# Patient Record
Sex: MALE | Race: WHITE | Employment: UNEMPLOYED | URBAN - METROPOLITAN AREA
[De-identification: names, ages, dates, MRNs, and addresses within clinical notes are randomized per-mention and may not be internally consistent; named-entity substitution may affect disease eponyms.]

---

## 2017-04-14 ENCOUNTER — OFFICE VISIT (OUTPATIENT)
Dept: INTERNAL MEDICINE CLINIC | Age: 47
End: 2017-04-14

## 2017-04-14 VITALS
WEIGHT: 197 LBS | DIASTOLIC BLOOD PRESSURE: 84 MMHG | HEART RATE: 76 BPM | OXYGEN SATURATION: 97 % | TEMPERATURE: 98.2 F | SYSTOLIC BLOOD PRESSURE: 120 MMHG

## 2017-04-14 DIAGNOSIS — R79.89 LOW TESTOSTERONE LEVEL IN MALE: ICD-10-CM

## 2017-04-14 DIAGNOSIS — F31.9 BIPOLAR AFFECTIVE DISORDER, REMISSION STATUS UNSPECIFIED (HCC): ICD-10-CM

## 2017-04-14 DIAGNOSIS — Z00.00 ROUTINE GENERAL MEDICAL EXAMINATION AT A HEALTH CARE FACILITY: Primary | ICD-10-CM

## 2017-04-14 DIAGNOSIS — Z00.00 ROUTINE GENERAL MEDICAL EXAMINATION AT A HEALTH CARE FACILITY: ICD-10-CM

## 2017-04-14 DIAGNOSIS — G89.29 CHRONIC MIDLINE BACK PAIN, UNSPECIFIED BACK LOCATION: ICD-10-CM

## 2017-04-14 DIAGNOSIS — M54.9 CHRONIC MIDLINE BACK PAIN, UNSPECIFIED BACK LOCATION: ICD-10-CM

## 2017-04-14 DIAGNOSIS — I10 ESSENTIAL HYPERTENSION: ICD-10-CM

## 2017-04-14 DIAGNOSIS — E03.9 HYPOTHYROIDISM, UNSPECIFIED TYPE: ICD-10-CM

## 2017-04-14 LAB
ANION GAP SERPL CALCULATED.3IONS-SCNC: 15 MMOL/L (ref 3–16)
BASOPHILS ABSOLUTE: 0.1 K/UL (ref 0–0.2)
BASOPHILS RELATIVE PERCENT: 1.1 %
BUN BLDV-MCNC: 10 MG/DL (ref 7–20)
CALCIUM SERPL-MCNC: 9.6 MG/DL (ref 8.3–10.6)
CHLORIDE BLD-SCNC: 102 MMOL/L (ref 99–110)
CHOLESTEROL, TOTAL: 231 MG/DL (ref 0–199)
CO2: 23 MMOL/L (ref 21–32)
CREAT SERPL-MCNC: 0.8 MG/DL (ref 0.9–1.3)
EOSINOPHILS ABSOLUTE: 0.2 K/UL (ref 0–0.6)
EOSINOPHILS RELATIVE PERCENT: 2.7 %
GFR AFRICAN AMERICAN: >60
GFR NON-AFRICAN AMERICAN: >60
GLUCOSE BLD-MCNC: 100 MG/DL (ref 70–99)
HCT VFR BLD CALC: 53.6 % (ref 40.5–52.5)
HDLC SERPL-MCNC: 58 MG/DL (ref 40–60)
HEMOGLOBIN: 17.9 G/DL (ref 13.5–17.5)
HEPATITIS C ANTIBODY INTERPRETATION: NORMAL
LDL CHOLESTEROL CALCULATED: 154 MG/DL
LYMPHOCYTES ABSOLUTE: 1.7 K/UL (ref 1–5.1)
LYMPHOCYTES RELATIVE PERCENT: 20.6 %
MCH RBC QN AUTO: 30.7 PG (ref 26–34)
MCHC RBC AUTO-ENTMCNC: 33.4 G/DL (ref 31–36)
MCV RBC AUTO: 91.9 FL (ref 80–100)
MONOCYTES ABSOLUTE: 0.8 K/UL (ref 0–1.3)
MONOCYTES RELATIVE PERCENT: 9.8 %
NEUTROPHILS ABSOLUTE: 5.4 K/UL (ref 1.7–7.7)
NEUTROPHILS RELATIVE PERCENT: 65.8 %
PDW BLD-RTO: 12.9 % (ref 12.4–15.4)
PLATELET # BLD: 190 K/UL (ref 135–450)
PMV BLD AUTO: 10.1 FL (ref 5–10.5)
POTASSIUM SERPL-SCNC: 4.3 MMOL/L (ref 3.5–5.1)
PROSTATE SPECIFIC ANTIGEN: 0.51 NG/ML (ref 0–4)
RBC # BLD: 5.83 M/UL (ref 4.2–5.9)
SODIUM BLD-SCNC: 140 MMOL/L (ref 136–145)
T4 FREE: 1.2 NG/DL (ref 0.9–1.8)
TRIGL SERPL-MCNC: 96 MG/DL (ref 0–150)
TSH REFLEX: 3.26 UIU/ML (ref 0.27–4.2)
VLDLC SERPL CALC-MCNC: 19 MG/DL
WBC # BLD: 8.3 K/UL (ref 4–11)

## 2017-04-14 PROCEDURE — 99386 PREV VISIT NEW AGE 40-64: CPT | Performed by: NURSE PRACTITIONER

## 2017-04-14 PROCEDURE — 93000 ELECTROCARDIOGRAM COMPLETE: CPT | Performed by: NURSE PRACTITIONER

## 2017-04-14 RX ORDER — ORPHENADRINE CITRATE 100 MG/1
100 TABLET, EXTENDED RELEASE ORAL 2 TIMES DAILY
COMMUNITY
End: 2017-04-14 | Stop reason: ALTCHOICE

## 2017-04-14 RX ORDER — LEVOTHYROXINE SODIUM 0.05 MG/1
50 TABLET ORAL DAILY
Qty: 30 TABLET | Refills: 3 | Status: SHIPPED | OUTPATIENT
Start: 2017-04-14 | End: 2017-12-13 | Stop reason: SDUPTHER

## 2017-04-14 RX ORDER — LEVOTHYROXINE SODIUM 0.05 MG/1
50 TABLET ORAL DAILY
COMMUNITY
End: 2017-04-14 | Stop reason: SDUPTHER

## 2017-04-14 RX ORDER — ZIPRASIDONE HYDROCHLORIDE 20 MG/1
20 CAPSULE ORAL 2 TIMES DAILY WITH MEALS
COMMUNITY
End: 2017-04-14 | Stop reason: ALTCHOICE

## 2017-04-14 RX ORDER — LISINOPRIL 20 MG/1
20 TABLET ORAL DAILY
COMMUNITY
End: 2017-04-14 | Stop reason: SDUPTHER

## 2017-04-14 RX ORDER — ZIPRASIDONE HYDROCHLORIDE 20 MG/1
20 CAPSULE ORAL 2 TIMES DAILY WITH MEALS
COMMUNITY
End: 2019-09-23 | Stop reason: ALTCHOICE

## 2017-04-14 RX ORDER — LISINOPRIL 20 MG/1
20 TABLET ORAL DAILY
Qty: 30 TABLET | Refills: 3 | Status: SHIPPED | OUTPATIENT
Start: 2017-04-14 | End: 2017-04-27 | Stop reason: ALTCHOICE

## 2017-04-14 RX ORDER — AMLODIPINE BESYLATE 5 MG/1
5 TABLET ORAL DAILY
Qty: 30 TABLET | Refills: 3 | Status: SHIPPED | OUTPATIENT
Start: 2017-04-14 | End: 2017-12-13 | Stop reason: SDUPTHER

## 2017-04-14 RX ORDER — AMLODIPINE BESYLATE 5 MG/1
5 TABLET ORAL DAILY
COMMUNITY
End: 2017-04-14 | Stop reason: SDUPTHER

## 2017-04-14 RX ORDER — HYDROCODONE BITARTRATE AND ACETAMINOPHEN 7.5; 325 MG/1; MG/1
1 TABLET ORAL EVERY 6 HOURS PRN
COMMUNITY
End: 2017-04-27 | Stop reason: ALTCHOICE

## 2017-04-14 RX ORDER — CLONAZEPAM 0.5 MG/1
0.5 TABLET ORAL 2 TIMES DAILY PRN
COMMUNITY
End: 2019-09-23 | Stop reason: ALTCHOICE

## 2017-04-14 ASSESSMENT — ENCOUNTER SYMPTOMS
DOUBLE VISION: 0
EYE DISCHARGE: 0
CONSTIPATION: 0
SHORTNESS OF BREATH: 0
SORE THROAT: 0
HEMOPTYSIS: 0
ABDOMINAL PAIN: 0
NAUSEA: 0
DIARRHEA: 0
ORTHOPNEA: 0
WHEEZING: 0
EYE PAIN: 0
SPUTUM PRODUCTION: 0
COUGH: 0
BLOOD IN STOOL: 0
EYE REDNESS: 0
PHOTOPHOBIA: 0
BACK PAIN: 1
STRIDOR: 0
VOMITING: 0
HEARTBURN: 0
BLURRED VISION: 0

## 2017-04-15 LAB
ESTIMATED AVERAGE GLUCOSE: 91.1 MG/DL
HBA1C MFR BLD: 4.8 %
HIV-1 AND HIV-2 ANTIBODIES: NORMAL

## 2017-04-16 LAB
SEX HORMONE BINDING GLOBULIN: 31 NMOL/L (ref 11–80)
TESTOSTERONE FREE PERCENT: 1.9 % (ref 1.6–2.9)
TESTOSTERONE FREE, CALC: 85 PG/ML (ref 47–244)
TESTOSTERONE TOTAL-MALE: 442 NG/DL (ref 300–890)

## 2017-04-17 DIAGNOSIS — R79.89 LOW TESTOSTERONE LEVEL IN MALE: Primary | ICD-10-CM

## 2017-04-18 ENCOUNTER — TELEPHONE (OUTPATIENT)
Dept: PAIN MANAGEMENT | Age: 47
End: 2017-04-18

## 2017-12-13 ENCOUNTER — OFFICE VISIT (OUTPATIENT)
Dept: PRIMARY CARE CLINIC | Age: 47
End: 2017-12-13

## 2017-12-13 VITALS
SYSTOLIC BLOOD PRESSURE: 138 MMHG | BODY MASS INDEX: 29.06 KG/M2 | HEART RATE: 77 BPM | TEMPERATURE: 97.9 F | WEIGHT: 203 LBS | DIASTOLIC BLOOD PRESSURE: 80 MMHG | HEIGHT: 70 IN | OXYGEN SATURATION: 98 %

## 2017-12-13 DIAGNOSIS — J40 BRONCHITIS: Primary | ICD-10-CM

## 2017-12-13 DIAGNOSIS — R06.2 WHEEZING ON AUSCULTATION: ICD-10-CM

## 2017-12-13 DIAGNOSIS — E03.9 HYPOTHYROIDISM, UNSPECIFIED TYPE: ICD-10-CM

## 2017-12-13 DIAGNOSIS — I10 ESSENTIAL HYPERTENSION: ICD-10-CM

## 2017-12-13 PROCEDURE — 99214 OFFICE O/P EST MOD 30 MIN: CPT | Performed by: NURSE PRACTITIONER

## 2017-12-13 RX ORDER — LEVOTHYROXINE SODIUM 0.05 MG/1
50 TABLET ORAL DAILY
Qty: 30 TABLET | Refills: 3 | Status: SHIPPED | OUTPATIENT
Start: 2017-12-13 | End: 2018-04-06 | Stop reason: SDUPTHER

## 2017-12-13 RX ORDER — METHYLPREDNISOLONE 4 MG/1
TABLET ORAL
Qty: 1 KIT | Refills: 0 | Status: SHIPPED | OUTPATIENT
Start: 2017-12-13 | End: 2017-12-19

## 2017-12-13 RX ORDER — ALBUTEROL SULFATE 90 UG/1
2 AEROSOL, METERED RESPIRATORY (INHALATION) EVERY 6 HOURS PRN
Qty: 1 INHALER | Refills: 3 | Status: SHIPPED | OUTPATIENT
Start: 2017-12-13 | End: 2019-09-23 | Stop reason: ALTCHOICE

## 2017-12-13 RX ORDER — AZITHROMYCIN 250 MG/1
TABLET, FILM COATED ORAL
Qty: 6 TABLET | Refills: 0 | Status: SHIPPED | OUTPATIENT
Start: 2017-12-13 | End: 2018-04-06 | Stop reason: ALTCHOICE

## 2017-12-13 RX ORDER — AMLODIPINE BESYLATE 5 MG/1
5 TABLET ORAL DAILY
Qty: 30 TABLET | Refills: 3 | Status: SHIPPED | OUTPATIENT
Start: 2017-12-13 | End: 2018-04-06 | Stop reason: SDUPTHER

## 2017-12-13 ASSESSMENT — ENCOUNTER SYMPTOMS
SHORTNESS OF BREATH: 1
SORE THROAT: 0
WHEEZING: 1
SINUS PAIN: 1
RHINORRHEA: 0
COUGH: 1
SPUTUM PRODUCTION: 1
HEMOPTYSIS: 0

## 2017-12-13 NOTE — PROGRESS NOTES
SUBJECTIVE:  John Moreira  1970  41 y.o. Chief Complaint   Patient presents with    Chest Congestion    Hypertension       Pt also asking for refills on BP and thyroid medicine as he has been out of for some time. Cough   This is a new problem. The current episode started 1 to 4 weeks ago. The problem has been gradually worsening. The problem occurs constantly. The cough is productive of sputum and productive of purulent sputum. Associated symptoms include chills, headaches, nasal congestion, shortness of breath, sweats and wheezing. Pertinent negatives include no chest pain, ear congestion, ear pain, fever, hemoptysis, postnasal drip, rhinorrhea or sore throat. Nothing aggravates the symptoms. Risk factors for lung disease include smoking/tobacco exposure. He has tried OTC cough suppressant (Dayquil) for the symptoms. The treatment provided mild relief. There is no history of asthma, bronchiectasis, bronchitis, COPD, emphysema, environmental allergies or pneumonia. Review of Systems   Constitutional: Positive for chills and malaise/fatigue. Negative for fever. HENT: Positive for congestion and sinus pain. Negative for ear pain, postnasal drip, rhinorrhea and sore throat. Respiratory: Positive for cough, sputum production, shortness of breath and wheezing. Negative for hemoptysis. Cardiovascular: Negative for chest pain. Neurological: Positive for headaches. Endo/Heme/Allergies: Negative for environmental allergies.        Past Medical History:   Diagnosis Date    ADHD (attention deficit hyperactivity disorder)     Anxiety     Chronic back pain     Depression     Erectile dysfunction     Headache     Hypertension     Hypothyroidism     Low testosterone level in male     Neuropathy [facility])        Current Outpatient Prescriptions   Medication Sig Dispense Refill    albuterol sulfate HFA (VENTOLIN HFA) 108 (90 Base) MCG/ACT inhaler Inhale 2 puffs into the lungs every 6 hours as needed for Wheezing 1 Inhaler 3    methylPREDNISolone (MEDROL DOSEPACK) 4 MG tablet Take by mouth. 1 kit 0    azithromycin (ZITHROMAX) 250 MG tablet Take 500 mg (two tablets) on day one. Take 250 mg (one tablet) daily for days 2-5. 6 tablet 0    levothyroxine (SYNTHROID) 50 MCG tablet Take 1 tablet by mouth Daily 30 tablet 3    amLODIPine (NORVASC) 5 MG tablet Take 1 tablet by mouth daily 30 tablet 3    butalbital-acetaminophen-caffeine (FIORICET) -40 MG per tablet Take 1 tablet by mouth every 6 hours as needed for Headaches 10 tablet 0    clonazePAM (KLONOPIN) 0.5 MG tablet Take 0.5 mg by mouth 2 times daily as needed      ziprasidone (GEODON) 20 MG capsule Take 20 mg by mouth 2 times daily (with meals)       No current facility-administered medications for this visit. OBJECTIVE:  /80   Pulse 77   Temp 97.9 °F (36.6 °C)   Ht 5' 10\" (1.778 m)   Wt 203 lb (92.1 kg)   SpO2 98%   BMI 29.13 kg/m²    Physical Exam   Constitutional: Vital signs are normal. He appears well-developed and well-nourished. HENT:   Right Ear: Hearing, tympanic membrane and ear canal normal.   Left Ear: Hearing, tympanic membrane and ear canal normal.   Nose: Rhinorrhea present. Right sinus exhibits no maxillary sinus tenderness and no frontal sinus tenderness. Left sinus exhibits no maxillary sinus tenderness and no frontal sinus tenderness. Mouth/Throat: Posterior oropharyngeal erythema present. No oropharyngeal exudate, posterior oropharyngeal edema or tonsillar abscesses. Cardiovascular: Normal rate, regular rhythm, S1 normal, S2 normal, normal heart sounds, intact distal pulses and normal pulses. Pulmonary/Chest: Effort normal. He has no decreased breath sounds. He has wheezes. He has rhonchi. He has no rales. Lymphadenopathy:        Head (right side): Submental, submandibular, tonsillar and preauricular adenopathy present.         Head (left side): Submental, submandibular, tonsillar and preauricular adenopathy present. He has no cervical adenopathy. Neurological: He is alert. Nursing note and vitals reviewed. ASSESSMENT/PLAN:  Cristian Cole was seen today for chest congestion and hypertension. Diagnoses and all orders for this visit:    Bronchitis  -     albuterol sulfate HFA (VENTOLIN HFA) 108 (90 Base) MCG/ACT inhaler; Inhale 2 puffs into the lungs every 6 hours as needed for Wheezing  -     methylPREDNISolone (MEDROL DOSEPACK) 4 MG tablet; Take by mouth.  -     azithromycin (ZITHROMAX) 250 MG tablet; Take 500 mg (two tablets) on day one. Take 250 mg (one tablet) daily for days 2-5. Wheezing on auscultation  -     albuterol sulfate HFA (VENTOLIN HFA) 108 (90 Base) MCG/ACT inhaler; Inhale 2 puffs into the lungs every 6 hours as needed for Wheezing  -     methylPREDNISolone (MEDROL DOSEPACK) 4 MG tablet; Take by mouth. Hypothyroidism, unspecified type  -     levothyroxine (SYNTHROID) 50 MCG tablet; Take 1 tablet by mouth Daily    Essential hypertension  -     amLODIPine (NORVASC) 5 MG tablet; Take 1 tablet by mouth daily          -Will rx albuterol, steroid burst, and azith for bronchitis and wheezing. -BP med and synthroid refilled. -Provided education and information on bronchitis, wheezing, symptomatic treatment, and prevention.  -If symptoms worsen or fail to improve, return for follow-up.

## 2017-12-13 NOTE — PATIENT INSTRUCTIONS
-May take Mucinex (guaifenisen) and Robitussin DM (guafenisen, dextromethorphan)  for cough and congestion. May also try Vicks Vaporub. -May take ibuprofen (Motrin, Advil) 200 mg tabs up to 4 tabs every 8 hours. May also take acetaminophen (Tylenol) as instructed on packaging.  -Avoid taking over the counter (OTC) medications that have multiple ingredients. Patient Education        Bronchitis: Care Instructions  Your Care Instructions    Bronchitis is inflammation of the bronchial tubes, which carry air to the lungs. The tubes swell and produce mucus, or phlegm. The mucus and inflamed bronchial tubes make you cough. You may have trouble breathing. Most cases of bronchitis are caused by viruses like those that cause colds. Antibiotics usually do not help and they may be harmful. Bronchitis usually develops rapidly and lasts about 2 to 3 weeks in otherwise healthy people. Follow-up care is a key part of your treatment and safety. Be sure to make and go to all appointments, and call your doctor if you are having problems. It's also a good idea to know your test results and keep a list of the medicines you take. How can you care for yourself at home? · Take all medicines exactly as prescribed. Call your doctor if you think you are having a problem with your medicine. · Get some extra rest.  · Take an over-the-counter pain medicine, such as acetaminophen (Tylenol), ibuprofen (Advil, Motrin), or naproxen (Aleve) to reduce fever and relieve body aches. Read and follow all instructions on the label. · Do not take two or more pain medicines at the same time unless the doctor told you to. Many pain medicines have acetaminophen, which is Tylenol. Too much acetaminophen (Tylenol) can be harmful. · Take an over-the-counter cough medicine that contains dextromethorphan to help quiet a dry, hacking cough so that you can sleep. Avoid cough medicines that have more than one active ingredient.  Read and follow all instructions on the label. · Breathe moist air from a humidifier, hot shower, or sink filled with hot water. The heat and moisture will thin mucus so you can cough it out. · Do not smoke. Smoking can make bronchitis worse. If you need help quitting, talk to your doctor about stop-smoking programs and medicines. These can increase your chances of quitting for good. When should you call for help? Call 911 anytime you think you may need emergency care. For example, call if:  ? · You have severe trouble breathing. ?Call your doctor now or seek immediate medical care if:  ? · You have new or worse trouble breathing. ? · You cough up dark brown or bloody mucus (sputum). ? · You have a new or higher fever. ? · You have a new rash. ? Watch closely for changes in your health, and be sure to contact your doctor if:  ? · You cough more deeply or more often, especially if you notice more mucus or a change in the color of your mucus. ? · You are not getting better as expected. Where can you learn more? Go to https://Meograph.[employer]One Rewards. org and sign in to your [employer] account. Enter H333 in the ModaMi box to learn more about \"Bronchitis: Care Instructions. \"     If you do not have an account, please click on the \"Sign Up Now\" link. Current as of: May 12, 2017  Content Version: 11.4  © 1226-9101 [employer]. Care instructions adapted under license by [facility] ([facility]). If you have questions about a medical condition or this instruction, always ask your healthcare professional. [place] [address] any warranty or liability for your use of this information. Patient Education        Wheezing or Bronchoconstriction: Care Instructions  Your Care Instructions  Wheezing is a whistling noise made during breathing. It occurs when the small airways, or bronchial tubes, that lead to your lungs swell or contract (spasm) and become narrow.  This narrowing is called quitting, talk to your doctor about stop-smoking programs and medicines. These can increase your chances of quitting for good. · Avoid things that may cause your wheezing. These may include colds, smoke, air pollution, dust, pollen, pets, cockroaches, stress, and cold air. When should you call for help? Call 911 anytime you think you may need emergency care. For example, call if:  ? · You have severe trouble breathing. ? · You passed out (lost consciousness). ?Call your doctor now or seek immediate medical care if:  ? · You cough up yellow, dark brown, or bloody mucus (sputum). ? · You have new or worse shortness of breath. ? · Your wheezing is not getting better or it gets worse after you start taking your medicine. ? Watch closely for changes in your health, and be sure to contact your doctor if:  ? · You do not get better as expected. Where can you learn more? Go to https://Thinker Thing.InStaff. org and sign in to your [employer] account. Enter 928 [id number] in the ITDatabase box to learn more about \"Wheezing or Bronchoconstriction: Care Instructions. \"     If you do not have an account, please click on the \"Sign Up Now\" link. Current as of: May 12, 2017  Content Version: 11.4  © 7485-6547 [employer]. Care instructions adapted under license by [facility] ([facility]). If you have questions about a medical condition or this instruction, always ask your healthcare professional. [place] [address] any warranty or liability for your use of this information.

## 2018-03-20 DIAGNOSIS — R79.89 LOW TESTOSTERONE LEVEL IN MALE: ICD-10-CM

## 2018-03-22 LAB
SEX HORMONE BINDING GLOBULIN: 25 NMOL/L (ref 11–80)
TESTOSTERONE FREE-NONMALE: 34.1 PG/ML (ref 47–244)
TESTOSTERONE TOTAL: 156 NG/DL (ref 220–1000)

## 2018-04-06 ENCOUNTER — OFFICE VISIT (OUTPATIENT)
Dept: PRIMARY CARE CLINIC | Age: 48
End: 2018-04-06

## 2018-04-06 VITALS
TEMPERATURE: 97.4 F | WEIGHT: 228.4 LBS | HEART RATE: 94 BPM | HEIGHT: 70 IN | DIASTOLIC BLOOD PRESSURE: 60 MMHG | SYSTOLIC BLOOD PRESSURE: 120 MMHG | BODY MASS INDEX: 32.7 KG/M2 | OXYGEN SATURATION: 98 %

## 2018-04-06 DIAGNOSIS — E03.9 HYPOTHYROIDISM, UNSPECIFIED TYPE: ICD-10-CM

## 2018-04-06 DIAGNOSIS — R53.83 FATIGUE, UNSPECIFIED TYPE: ICD-10-CM

## 2018-04-06 DIAGNOSIS — G47.00 INSOMNIA, UNSPECIFIED TYPE: ICD-10-CM

## 2018-04-06 DIAGNOSIS — I10 ESSENTIAL HYPERTENSION: ICD-10-CM

## 2018-04-06 DIAGNOSIS — F32.A DEPRESSION, UNSPECIFIED DEPRESSION TYPE: ICD-10-CM

## 2018-04-06 DIAGNOSIS — R79.89 LOW TESTOSTERONE IN MALE: Primary | ICD-10-CM

## 2018-04-06 PROCEDURE — 1036F TOBACCO NON-USER: CPT | Performed by: NURSE PRACTITIONER

## 2018-04-06 PROCEDURE — APPNB15 APP NON BILLABLE TIME 0-15 MINS: Performed by: NURSE PRACTITIONER

## 2018-04-06 PROCEDURE — G8417 CALC BMI ABV UP PARAM F/U: HCPCS | Performed by: NURSE PRACTITIONER

## 2018-04-06 PROCEDURE — G8427 DOCREV CUR MEDS BY ELIG CLIN: HCPCS | Performed by: NURSE PRACTITIONER

## 2018-04-06 PROCEDURE — 99213 OFFICE O/P EST LOW 20 MIN: CPT | Performed by: NURSE PRACTITIONER

## 2018-04-06 RX ORDER — BUTALBITAL, ACETAMINOPHEN AND CAFFEINE 50; 325; 40 MG/1; MG/1; MG/1
1 TABLET ORAL EVERY 6 HOURS PRN
Qty: 10 TABLET | Refills: 1 | Status: SHIPPED | OUTPATIENT
Start: 2018-04-06 | End: 2019-09-23 | Stop reason: ALTCHOICE

## 2018-04-06 RX ORDER — LEVOTHYROXINE SODIUM 0.05 MG/1
50 TABLET ORAL DAILY
Qty: 30 TABLET | Refills: 3 | Status: SHIPPED | OUTPATIENT
Start: 2018-04-06 | End: 2019-09-23 | Stop reason: ALTCHOICE

## 2018-04-06 RX ORDER — AMLODIPINE BESYLATE 5 MG/1
5 TABLET ORAL DAILY
Qty: 30 TABLET | Refills: 3 | Status: SHIPPED | OUTPATIENT
Start: 2018-04-06 | End: 2019-09-23 | Stop reason: ALTCHOICE

## 2018-04-06 ASSESSMENT — ENCOUNTER SYMPTOMS: GASTROINTESTINAL NEGATIVE: 1

## 2019-04-01 ENCOUNTER — TELEPHONE (OUTPATIENT)
Dept: INTERNAL MEDICINE CLINIC | Age: 49
End: 2019-04-01

## 2019-09-03 ENCOUNTER — HOSPITAL ENCOUNTER (EMERGENCY)
Age: 49
Discharge: HOME | End: 2019-09-03
Attending: EMERGENCY MEDICINE
Payer: COMMERCIAL

## 2019-09-03 VITALS
TEMPERATURE: 98.6 F | WEIGHT: 231.26 LBS | SYSTOLIC BLOOD PRESSURE: 154 MMHG | OXYGEN SATURATION: 96 % | HEART RATE: 82 BPM | HEIGHT: 70 IN | BODY MASS INDEX: 33.11 KG/M2 | RESPIRATION RATE: 20 BRPM | DIASTOLIC BLOOD PRESSURE: 88 MMHG

## 2019-09-03 DIAGNOSIS — G89.29 CHRONIC LEFT SHOULDER PAIN: ICD-10-CM

## 2019-09-03 DIAGNOSIS — H66.90 ACUTE OTITIS MEDIA, UNSPECIFIED OTITIS MEDIA TYPE: Primary | ICD-10-CM

## 2019-09-03 DIAGNOSIS — M25.512 CHRONIC LEFT SHOULDER PAIN: ICD-10-CM

## 2019-09-03 DIAGNOSIS — L73.9 FOLLICULITIS: ICD-10-CM

## 2019-09-03 PROCEDURE — 96372 THER/PROPH/DIAG INJ SC/IM: CPT

## 2019-09-03 PROCEDURE — 6360000002 HC RX W HCPCS: Performed by: EMERGENCY MEDICINE

## 2019-09-03 PROCEDURE — 6370000000 HC RX 637 (ALT 250 FOR IP): Performed by: EMERGENCY MEDICINE

## 2019-09-03 PROCEDURE — 99283 EMERGENCY DEPT VISIT LOW MDM: CPT

## 2019-09-03 RX ORDER — ACETAMINOPHEN 500 MG
500 TABLET ORAL 4 TIMES DAILY PRN
Qty: 30 TABLET | Refills: 0 | Status: SHIPPED | OUTPATIENT
Start: 2019-09-03 | End: 2019-09-23 | Stop reason: ALTCHOICE

## 2019-09-03 RX ORDER — KETOROLAC TROMETHAMINE 30 MG/ML
30 INJECTION, SOLUTION INTRAMUSCULAR; INTRAVENOUS ONCE
Status: COMPLETED | OUTPATIENT
Start: 2019-09-03 | End: 2019-09-03

## 2019-09-03 RX ORDER — NAPROXEN 250 MG/1
500 TABLET ORAL 2 TIMES DAILY WITH MEALS
Qty: 30 TABLET | Refills: 1 | Status: SHIPPED | OUTPATIENT
Start: 2019-09-03 | End: 2019-09-23 | Stop reason: ALTCHOICE

## 2019-09-03 RX ORDER — AMOXICILLIN 250 MG/1
1000 CAPSULE ORAL ONCE
Status: COMPLETED | OUTPATIENT
Start: 2019-09-03 | End: 2019-09-03

## 2019-09-03 RX ORDER — AMOXICILLIN 500 MG/1
500 TABLET, FILM COATED ORAL 2 TIMES DAILY
Qty: 40 TABLET | Refills: 0 | Status: SHIPPED | OUTPATIENT
Start: 2019-09-03 | End: 2019-09-30

## 2019-09-03 RX ORDER — AMOXICILLIN 500 MG/1
1000 TABLET, FILM COATED ORAL 2 TIMES DAILY
Qty: 40 TABLET | Refills: 0 | Status: SHIPPED | OUTPATIENT
Start: 2019-09-03 | End: 2019-09-03 | Stop reason: SDUPTHER

## 2019-09-03 RX ADMIN — AMOXICILLIN 1000 MG: 250 CAPSULE ORAL at 21:06

## 2019-09-03 RX ADMIN — KETOROLAC TROMETHAMINE 30 MG: 30 INJECTION, SOLUTION INTRAMUSCULAR at 21:06

## 2019-09-03 ASSESSMENT — PAIN DESCRIPTION - LOCATION
LOCATION: SHOULDER
LOCATION: SHOULDER
LOCATION: EAR

## 2019-09-03 ASSESSMENT — PAIN DESCRIPTION - FREQUENCY
FREQUENCY: INTERMITTENT
FREQUENCY: INTERMITTENT
FREQUENCY: CONTINUOUS

## 2019-09-03 ASSESSMENT — PAIN DESCRIPTION - DESCRIPTORS
DESCRIPTORS: ACHING;THROBBING
DESCRIPTORS: ACHING;SHARP
DESCRIPTORS: SPASM

## 2019-09-03 ASSESSMENT — PAIN SCALES - GENERAL
PAINLEVEL_OUTOF10: 8
PAINLEVEL_OUTOF10: 7
PAINLEVEL_OUTOF10: 6
PAINLEVEL_OUTOF10: 7
PAINLEVEL_OUTOF10: 8

## 2019-09-03 ASSESSMENT — PAIN DESCRIPTION - PAIN TYPE
TYPE: ACUTE PAIN
TYPE: ACUTE PAIN;CHRONIC PAIN
TYPE: CHRONIC PAIN;ACUTE PAIN

## 2019-09-03 ASSESSMENT — PAIN DESCRIPTION - ORIENTATION
ORIENTATION: LEFT

## 2019-09-03 ASSESSMENT — PAIN - FUNCTIONAL ASSESSMENT: PAIN_FUNCTIONAL_ASSESSMENT: PREVENTS OR INTERFERES SOME ACTIVE ACTIVITIES AND ADLS

## 2019-09-04 NOTE — ED PROVIDER NOTES
[id number] [facility]  eMERGENCY dEPARTMENTeNCOUnter      Pt Name: Cookie Voss  MRN: [id number]  [place] 1970  Date of evaluation: 9/3/2019  Provider: Zaida Garcia MD    [address] [place] [address]       Chief Complaint   Patient presents with   [place]     ear ache left ear and tenderness under left ear/states \"whole head hurts\"    Shoulder Pain     states left shoulder remains painful after surgery on 4/2/19 needs an MRI and pain medication         HISTORY OF PRESENT ILLNESS   (Location/Symptom, Timing/Onset,Context/Setting, Quality, Duration, Modifying Factors, Severity)  Note limiting factors. Cookie Voss is a 54 y.o. male who presents to the emergency department for multiple complaints including left ear pain and difficulty hearing from the left ear. Headache, left shoulder pain. He also has a bump underneath his right arm. With regard to the ear pain this is been going on for about 2 days. He has been having left shoulder pain since surgery in April. He had an injury in January. He is seen orthopedic surgery on Friday and they plan to schedule an MRI but this was not done yet. He was not given any medications for pain. With regards to the bump under his arm he said that this is been there for a couple of months. It is tender. No fevers or chills. Nursing notes were reviewed. REVIEW OF SYSTEMS    (2-9 systems for level 4, 10 or more for level 5)     Review of Systems    Positive and pertinent negative as per HPI. Except as noted above in the ROS, all other systems were reviewed and were negative.     PAST MEDICAL HISTORY     Past Medical History:   Diagnosis Date    ADHD (attention deficit hyperactivity disorder)     Anxiety     Chronic back pain     Depression     Erectile dysfunction     Headache     Hypertension     Hypothyroidism     Low testosterone level in male     Neuropathy          SURGICALHISTORY       Past Surgical History:   Procedure Laterality Date

## 2019-09-04 NOTE — ED NOTES
Ambulates to room #7 accompanied by S.O.  With several complaints  Had signed in with \"side pain\" when in fact he is complaining about a left earache and tenderness behind left ear-  Also is complaining about pain in the left shoulder and arm since surgery on 4/2/19-states that his ortho did not want to order anything for pain until he had his MRI-movement is limited  With left arm, face is rogelio  Complains of pain with :MD checking pupils, touching pinna of RIGHT ear, palpation of head, moving right arm/shoulder  States that he \"lost\" his MD due to change in insurance and is out of all his medications and has been trying to get new MD \"for months\"  States he needs his thyroid/geodon/klonipin  States his shoulder pain prevent him from sleeping     Jason Choi RN  09/03/19 [id number]

## 2019-09-05 ENCOUNTER — TELEPHONE (OUTPATIENT)
Dept: OTHER | Age: 49
End: 2019-09-05

## 2019-09-10 ENCOUNTER — HOSPITAL ENCOUNTER (OUTPATIENT)
Dept: MRI IMAGING | Age: 49
Discharge: HOME | End: 2019-09-10
Payer: COMMERCIAL

## 2019-09-10 DIAGNOSIS — M25.512 LEFT SHOULDER PAIN, UNSPECIFIED CHRONICITY: ICD-10-CM

## 2019-09-10 DIAGNOSIS — G89.29 OTHER CHRONIC PAIN: ICD-10-CM

## 2019-09-10 PROCEDURE — 73221 MRI JOINT UPR EXTREM W/O DYE: CPT

## 2019-09-23 ENCOUNTER — OFFICE VISIT (OUTPATIENT)
Dept: FAMILY MEDICINE CLINIC | Age: 49
End: 2019-09-23
Payer: COMMERCIAL

## 2019-09-23 VITALS
WEIGHT: 239.4 LBS | SYSTOLIC BLOOD PRESSURE: 172 MMHG | BODY MASS INDEX: 34.27 KG/M2 | HEIGHT: 70 IN | DIASTOLIC BLOOD PRESSURE: 102 MMHG

## 2019-09-23 DIAGNOSIS — R79.89 LOW TESTOSTERONE: ICD-10-CM

## 2019-09-23 DIAGNOSIS — Z76.89 ENCOUNTER TO ESTABLISH CARE: Primary | ICD-10-CM

## 2019-09-23 DIAGNOSIS — M25.512 CHRONIC LEFT SHOULDER PAIN: ICD-10-CM

## 2019-09-23 DIAGNOSIS — G89.29 CHRONIC LEFT SHOULDER PAIN: ICD-10-CM

## 2019-09-23 DIAGNOSIS — F32.A DEPRESSION, UNSPECIFIED DEPRESSION TYPE: ICD-10-CM

## 2019-09-23 DIAGNOSIS — I10 HYPERTENSION, UNSPECIFIED TYPE: ICD-10-CM

## 2019-09-23 DIAGNOSIS — F41.9 ANXIETY: ICD-10-CM

## 2019-09-23 DIAGNOSIS — H66.92 LEFT OTITIS MEDIA, UNSPECIFIED OTITIS MEDIA TYPE: ICD-10-CM

## 2019-09-23 PROCEDURE — G8427 DOCREV CUR MEDS BY ELIG CLIN: HCPCS | Performed by: FAMILY MEDICINE

## 2019-09-23 PROCEDURE — 1036F TOBACCO NON-USER: CPT | Performed by: FAMILY MEDICINE

## 2019-09-23 PROCEDURE — G8417 CALC BMI ABV UP PARAM F/U: HCPCS | Performed by: FAMILY MEDICINE

## 2019-09-23 PROCEDURE — 99214 OFFICE O/P EST MOD 30 MIN: CPT | Performed by: FAMILY MEDICINE

## 2019-09-23 RX ORDER — AMOXICILLIN AND CLAVULANATE POTASSIUM 875; 125 MG/1; MG/1
1 TABLET, FILM COATED ORAL 2 TIMES DAILY
Qty: 28 TABLET | Refills: 0 | Status: SHIPPED | OUTPATIENT
Start: 2019-09-23 | End: 2019-10-07

## 2019-09-23 RX ORDER — LISINOPRIL AND HYDROCHLOROTHIAZIDE 12.5; 1 MG/1; MG/1
1 TABLET ORAL DAILY
Qty: 30 TABLET | Refills: 0 | Status: SHIPPED | OUTPATIENT
Start: 2019-09-23 | End: 2019-10-24 | Stop reason: SDUPTHER

## 2019-09-23 ASSESSMENT — ENCOUNTER SYMPTOMS
COUGH: 0
DIARRHEA: 0
FACIAL SWELLING: 0
NAUSEA: 0
SINUS PRESSURE: 0
ABDOMINAL PAIN: 0
SORE THROAT: 0
SHORTNESS OF BREATH: 0
VOMITING: 0
TROUBLE SWALLOWING: 0
WHEEZING: 0
EYE DISCHARGE: 0
CHEST TIGHTNESS: 0
RHINORRHEA: 0

## 2019-09-23 ASSESSMENT — PATIENT HEALTH QUESTIONNAIRE - PHQ9
SUM OF ALL RESPONSES TO PHQ QUESTIONS 1-9: 22
2. FEELING DOWN, DEPRESSED OR HOPELESS: 3
9. THOUGHTS THAT YOU WOULD BE BETTER OFF DEAD, OR OF HURTING YOURSELF: 1
3. TROUBLE FALLING OR STAYING ASLEEP: 3
1. LITTLE INTEREST OR PLEASURE IN DOING THINGS: 3
5. POOR APPETITE OR OVEREATING: 1
10. IF YOU CHECKED OFF ANY PROBLEMS, HOW DIFFICULT HAVE THESE PROBLEMS MADE IT FOR YOU TO DO YOUR WORK, TAKE CARE OF THINGS AT HOME, OR GET ALONG WITH OTHER PEOPLE: 3
SUM OF ALL RESPONSES TO PHQ QUESTIONS 1-9: 22
8. MOVING OR SPEAKING SO SLOWLY THAT OTHER PEOPLE COULD HAVE NOTICED. OR THE OPPOSITE, BEING SO FIGETY OR RESTLESS THAT YOU HAVE BEEN MOVING AROUND A LOT MORE THAN USUAL: 3
SUM OF ALL RESPONSES TO PHQ9 QUESTIONS 1 & 2: 6
6. FEELING BAD ABOUT YOURSELF - OR THAT YOU ARE A FAILURE OR HAVE LET YOURSELF OR YOUR FAMILY DOWN: 3
7. TROUBLE CONCENTRATING ON THINGS, SUCH AS READING THE NEWSPAPER OR WATCHING TELEVISION: 2
4. FEELING TIRED OR HAVING LITTLE ENERGY: 3

## 2019-09-24 DIAGNOSIS — R79.89 LOW TESTOSTERONE: ICD-10-CM

## 2019-09-24 DIAGNOSIS — I10 HYPERTENSION, UNSPECIFIED TYPE: ICD-10-CM

## 2019-09-24 DIAGNOSIS — Z76.89 ENCOUNTER TO ESTABLISH CARE: ICD-10-CM

## 2019-09-24 LAB
A/G RATIO: 1.8 (ref 1.1–2.2)
ALBUMIN SERPL-MCNC: 4.4 G/DL (ref 3.4–5)
ALP BLD-CCNC: 101 U/L (ref 40–129)
ALT SERPL-CCNC: 53 U/L (ref 10–40)
ANION GAP SERPL CALCULATED.3IONS-SCNC: 16 MMOL/L (ref 3–16)
AST SERPL-CCNC: 29 U/L (ref 15–37)
BILIRUB SERPL-MCNC: 0.5 MG/DL (ref 0–1)
BUN BLDV-MCNC: 13 MG/DL (ref 7–20)
CALCIUM SERPL-MCNC: 9.7 MG/DL (ref 8.3–10.6)
CHLORIDE BLD-SCNC: 101 MMOL/L (ref 99–110)
CO2: 24 MMOL/L (ref 21–32)
CREAT SERPL-MCNC: 1.1 MG/DL (ref 0.9–1.3)
GFR AFRICAN AMERICAN: >60
GFR NON-AFRICAN AMERICAN: >60
GLOBULIN: 2.4 G/DL
GLUCOSE BLD-MCNC: 90 MG/DL (ref 70–99)
HCT VFR BLD CALC: 54.4 % (ref 40.5–52.5)
HEMOGLOBIN: 17.8 G/DL (ref 13.5–17.5)
MCH RBC QN AUTO: 28.9 PG (ref 26–34)
MCHC RBC AUTO-ENTMCNC: 32.8 G/DL (ref 31–36)
MCV RBC AUTO: 88 FL (ref 80–100)
PDW BLD-RTO: 14.7 % (ref 12.4–15.4)
PLATELET # BLD: 240 K/UL (ref 135–450)
PMV BLD AUTO: 9.8 FL (ref 5–10.5)
POTASSIUM SERPL-SCNC: 4.9 MMOL/L (ref 3.5–5.1)
RBC # BLD: 6.18 M/UL (ref 4.2–5.9)
SODIUM BLD-SCNC: 141 MMOL/L (ref 136–145)
T4 FREE: 1.2 NG/DL (ref 0.9–1.8)
TOTAL PROTEIN: 6.8 G/DL (ref 6.4–8.2)
TSH REFLEX FT4: 4.78 UIU/ML (ref 0.27–4.2)
WBC # BLD: 11.4 K/UL (ref 4–11)

## 2019-09-26 DIAGNOSIS — R79.89 ELEVATED TESTOSTERONE LEVEL: Primary | ICD-10-CM

## 2019-09-26 LAB
SEX HORMONE BINDING GLOBULIN: 19 NMOL/L (ref 11–80)
TESTOSTERONE FREE-NONMALE: ABNORMAL PG/ML (ref 47–244)
TESTOSTERONE TOTAL: >1500 NG/DL (ref 220–1000)

## 2019-09-30 ENCOUNTER — OFFICE VISIT (OUTPATIENT)
Dept: FAMILY MEDICINE CLINIC | Age: 49
End: 2019-09-30
Payer: COMMERCIAL

## 2019-09-30 VITALS
HEIGHT: 70 IN | WEIGHT: 226.4 LBS | SYSTOLIC BLOOD PRESSURE: 142 MMHG | BODY MASS INDEX: 32.41 KG/M2 | DIASTOLIC BLOOD PRESSURE: 98 MMHG

## 2019-09-30 DIAGNOSIS — I10 ESSENTIAL HYPERTENSION: ICD-10-CM

## 2019-09-30 DIAGNOSIS — F41.8 DEPRESSION WITH ANXIETY: ICD-10-CM

## 2019-09-30 DIAGNOSIS — H91.93 HEARING LOSS ASSOCIATED WITH SYNDROME OF BOTH EARS: ICD-10-CM

## 2019-09-30 DIAGNOSIS — E03.9 HYPOTHYROIDISM, UNSPECIFIED TYPE: ICD-10-CM

## 2019-09-30 DIAGNOSIS — R79.89 LOW TESTOSTERONE: Primary | ICD-10-CM

## 2019-09-30 PROCEDURE — 1036F TOBACCO NON-USER: CPT | Performed by: FAMILY MEDICINE

## 2019-09-30 PROCEDURE — G8417 CALC BMI ABV UP PARAM F/U: HCPCS | Performed by: FAMILY MEDICINE

## 2019-09-30 PROCEDURE — 99214 OFFICE O/P EST MOD 30 MIN: CPT | Performed by: FAMILY MEDICINE

## 2019-09-30 PROCEDURE — G8427 DOCREV CUR MEDS BY ELIG CLIN: HCPCS | Performed by: FAMILY MEDICINE

## 2019-09-30 RX ORDER — LEVOTHYROXINE SODIUM 0.03 MG/1
25 TABLET ORAL DAILY
Qty: 30 TABLET | Refills: 2 | Status: SHIPPED | OUTPATIENT
Start: 2019-09-30 | End: 2019-10-28 | Stop reason: SDUPTHER

## 2019-09-30 ASSESSMENT — ENCOUNTER SYMPTOMS
ABDOMINAL PAIN: 0
DIARRHEA: 0
SORE THROAT: 0
SHORTNESS OF BREATH: 0
WHEEZING: 0
SINUS PRESSURE: 0
RHINORRHEA: 0
CHEST TIGHTNESS: 0
TROUBLE SWALLOWING: 0
COUGH: 0
EYE DISCHARGE: 0
NAUSEA: 0
VOMITING: 0

## 2019-10-02 ASSESSMENT — ENCOUNTER SYMPTOMS: BACK PAIN: 1

## 2019-10-14 ENCOUNTER — PROCEDURE VISIT (OUTPATIENT)
Dept: AUDIOLOGY | Age: 49
End: 2019-10-14
Payer: COMMERCIAL

## 2019-10-14 DIAGNOSIS — H90.3 SENSORINEURAL HEARING LOSS (SNHL) OF BOTH EARS: Primary | ICD-10-CM

## 2019-10-14 PROCEDURE — 92557 COMPREHENSIVE HEARING TEST: CPT | Performed by: AUDIOLOGIST

## 2019-10-14 PROCEDURE — 92567 TYMPANOMETRY: CPT | Performed by: AUDIOLOGIST

## 2019-10-25 RX ORDER — LISINOPRIL AND HYDROCHLOROTHIAZIDE 12.5; 1 MG/1; MG/1
TABLET ORAL
Qty: 30 TABLET | Refills: 0 | Status: SHIPPED | OUTPATIENT
Start: 2019-10-25 | End: 2019-10-28 | Stop reason: SDUPTHER

## 2019-10-28 ENCOUNTER — TELEPHONE (OUTPATIENT)
Dept: ENT CLINIC | Age: 49
End: 2019-10-28

## 2019-10-28 ENCOUNTER — OFFICE VISIT (OUTPATIENT)
Dept: FAMILY MEDICINE CLINIC | Age: 49
End: 2019-10-28
Payer: COMMERCIAL

## 2019-10-28 VITALS
DIASTOLIC BLOOD PRESSURE: 88 MMHG | HEIGHT: 70 IN | SYSTOLIC BLOOD PRESSURE: 130 MMHG | WEIGHT: 228.2 LBS | BODY MASS INDEX: 32.67 KG/M2

## 2019-10-28 DIAGNOSIS — R53.83 FATIGUE, UNSPECIFIED TYPE: ICD-10-CM

## 2019-10-28 DIAGNOSIS — M25.50 ARTHRALGIA, UNSPECIFIED JOINT: ICD-10-CM

## 2019-10-28 DIAGNOSIS — R74.8 ELEVATED LIVER ENZYMES: ICD-10-CM

## 2019-10-28 DIAGNOSIS — F32.A DEPRESSION, UNSPECIFIED DEPRESSION TYPE: ICD-10-CM

## 2019-10-28 DIAGNOSIS — I10 ESSENTIAL HYPERTENSION: Primary | ICD-10-CM

## 2019-10-28 DIAGNOSIS — E03.9 HYPOTHYROIDISM, UNSPECIFIED TYPE: ICD-10-CM

## 2019-10-28 LAB
A/G RATIO: 2.8 (ref 1.1–2.2)
ALBUMIN SERPL-MCNC: 5 G/DL (ref 3.4–5)
ALP BLD-CCNC: 109 U/L (ref 40–129)
ALT SERPL-CCNC: 69 U/L (ref 10–40)
ANION GAP SERPL CALCULATED.3IONS-SCNC: 17 MMOL/L (ref 3–16)
AST SERPL-CCNC: 36 U/L (ref 15–37)
BILIRUB SERPL-MCNC: 0.5 MG/DL (ref 0–1)
BUN BLDV-MCNC: 12 MG/DL (ref 7–20)
CALCIUM SERPL-MCNC: 9.8 MG/DL (ref 8.3–10.6)
CHLORIDE BLD-SCNC: 102 MMOL/L (ref 99–110)
CHOLESTEROL, TOTAL: 220 MG/DL (ref 0–199)
CO2: 22 MMOL/L (ref 21–32)
CREAT SERPL-MCNC: 1 MG/DL (ref 0.9–1.3)
GFR AFRICAN AMERICAN: >60
GFR NON-AFRICAN AMERICAN: >60
GLOBULIN: 1.8 G/DL
GLUCOSE BLD-MCNC: 104 MG/DL (ref 70–99)
HCT VFR BLD CALC: 54 % (ref 40.5–52.5)
HDLC SERPL-MCNC: 57 MG/DL (ref 40–60)
HEMOGLOBIN: 18 G/DL (ref 13.5–17.5)
LDL CHOLESTEROL CALCULATED: 143 MG/DL
MCH RBC QN AUTO: 29.2 PG (ref 26–34)
MCHC RBC AUTO-ENTMCNC: 33.4 G/DL (ref 31–36)
MCV RBC AUTO: 87.4 FL (ref 80–100)
PDW BLD-RTO: 13.5 % (ref 12.4–15.4)
PLATELET # BLD: 218 K/UL (ref 135–450)
PMV BLD AUTO: 9.7 FL (ref 5–10.5)
POTASSIUM SERPL-SCNC: 4.1 MMOL/L (ref 3.5–5.1)
RBC # BLD: 6.18 M/UL (ref 4.2–5.9)
SEDIMENTATION RATE, ERYTHROCYTE: 1 MM/HR (ref 0–15)
SODIUM BLD-SCNC: 141 MMOL/L (ref 136–145)
TOTAL PROTEIN: 6.8 G/DL (ref 6.4–8.2)
TRIGL SERPL-MCNC: 100 MG/DL (ref 0–150)
VLDLC SERPL CALC-MCNC: 20 MG/DL
WBC # BLD: 8.8 K/UL (ref 4–11)

## 2019-10-28 PROCEDURE — G8484 FLU IMMUNIZE NO ADMIN: HCPCS | Performed by: FAMILY MEDICINE

## 2019-10-28 PROCEDURE — G8427 DOCREV CUR MEDS BY ELIG CLIN: HCPCS | Performed by: FAMILY MEDICINE

## 2019-10-28 PROCEDURE — 96160 PT-FOCUSED HLTH RISK ASSMT: CPT | Performed by: FAMILY MEDICINE

## 2019-10-28 PROCEDURE — 99214 OFFICE O/P EST MOD 30 MIN: CPT | Performed by: FAMILY MEDICINE

## 2019-10-28 PROCEDURE — G8417 CALC BMI ABV UP PARAM F/U: HCPCS | Performed by: FAMILY MEDICINE

## 2019-10-28 PROCEDURE — 1036F TOBACCO NON-USER: CPT | Performed by: FAMILY MEDICINE

## 2019-10-28 RX ORDER — LISINOPRIL AND HYDROCHLOROTHIAZIDE 12.5; 1 MG/1; MG/1
TABLET ORAL
Qty: 30 TABLET | Refills: 0 | Status: SHIPPED | OUTPATIENT
Start: 2019-10-28 | End: 2020-01-06

## 2019-10-28 RX ORDER — LEVOTHYROXINE SODIUM 0.03 MG/1
25 TABLET ORAL DAILY
Qty: 30 TABLET | Refills: 2 | Status: SHIPPED | OUTPATIENT
Start: 2019-10-28 | End: 2021-02-22

## 2019-10-28 SDOH — HEALTH STABILITY: MENTAL HEALTH: HOW OFTEN DO YOU HAVE A DRINK CONTAINING ALCOHOL?: MONTHLY OR LESS

## 2019-10-28 ASSESSMENT — PATIENT HEALTH QUESTIONNAIRE - PHQ9
3. TROUBLE FALLING OR STAYING ASLEEP: 3
2. FEELING DOWN, DEPRESSED OR HOPELESS: 2
6. FEELING BAD ABOUT YOURSELF - OR THAT YOU ARE A FAILURE OR HAVE LET YOURSELF OR YOUR FAMILY DOWN: 3
8. MOVING OR SPEAKING SO SLOWLY THAT OTHER PEOPLE COULD HAVE NOTICED. OR THE OPPOSITE, BEING SO FIGETY OR RESTLESS THAT YOU HAVE BEEN MOVING AROUND A LOT MORE THAN USUAL: 2
SUM OF ALL RESPONSES TO PHQ QUESTIONS 1-9: 19
SUM OF ALL RESPONSES TO PHQ9 QUESTIONS 1 & 2: 4
7. TROUBLE CONCENTRATING ON THINGS, SUCH AS READING THE NEWSPAPER OR WATCHING TELEVISION: 2
9. THOUGHTS THAT YOU WOULD BE BETTER OFF DEAD, OR OF HURTING YOURSELF: 1
4. FEELING TIRED OR HAVING LITTLE ENERGY: 3
1. LITTLE INTEREST OR PLEASURE IN DOING THINGS: 2
SUM OF ALL RESPONSES TO PHQ QUESTIONS 1-9: 19
5. POOR APPETITE OR OVEREATING: 1
10. IF YOU CHECKED OFF ANY PROBLEMS, HOW DIFFICULT HAVE THESE PROBLEMS MADE IT FOR YOU TO DO YOUR WORK, TAKE CARE OF THINGS AT HOME, OR GET ALONG WITH OTHER PEOPLE: 2

## 2019-10-28 ASSESSMENT — ENCOUNTER SYMPTOMS
SORE THROAT: 0
EYE DISCHARGE: 0
DIARRHEA: 0
COUGH: 0
SHORTNESS OF BREATH: 0
WHEEZING: 0
SINUS PRESSURE: 0
VOMITING: 0
RHINORRHEA: 0
ABDOMINAL PAIN: 0
CHEST TIGHTNESS: 0
NAUSEA: 0
COLOR CHANGE: 0
TROUBLE SWALLOWING: 0

## 2019-10-29 LAB — ANTI-NUCLEAR ANTIBODY (ANA): NEGATIVE

## 2019-12-10 ENCOUNTER — APPOINTMENT (OUTPATIENT)
Dept: GENERAL RADIOLOGY | Age: 49
End: 2019-12-10
Payer: COMMERCIAL

## 2019-12-10 ENCOUNTER — APPOINTMENT (OUTPATIENT)
Dept: CT IMAGING | Age: 49
End: 2019-12-10
Payer: COMMERCIAL

## 2019-12-10 ENCOUNTER — HOSPITAL ENCOUNTER (EMERGENCY)
Age: 49
Discharge: HOME | End: 2019-12-10
Attending: EMERGENCY MEDICINE
Payer: COMMERCIAL

## 2019-12-10 DIAGNOSIS — M25.512 CHRONIC LEFT SHOULDER PAIN: Primary | ICD-10-CM

## 2019-12-10 DIAGNOSIS — G89.29 CHRONIC LEFT SHOULDER PAIN: Primary | ICD-10-CM

## 2019-12-10 DIAGNOSIS — R07.9 CHEST PAIN, UNSPECIFIED TYPE: ICD-10-CM

## 2019-12-10 DIAGNOSIS — R10.84 GENERALIZED ABDOMINAL PAIN: ICD-10-CM

## 2019-12-10 PROCEDURE — 99283 EMERGENCY DEPT VISIT LOW MDM: CPT

## 2019-12-10 PROCEDURE — 93005 ELECTROCARDIOGRAM TRACING: CPT | Performed by: EMERGENCY MEDICINE

## 2019-12-10 RX ORDER — KETOROLAC TROMETHAMINE 30 MG/ML
15 INJECTION, SOLUTION INTRAMUSCULAR; INTRAVENOUS ONCE
Status: DISCONTINUED | OUTPATIENT
Start: 2019-12-10 | End: 2019-12-10 | Stop reason: HOSPADM

## 2019-12-10 RX ORDER — METOPROLOL TARTRATE 100 MG/1
100 TABLET ORAL 2 TIMES DAILY
COMMUNITY
End: 2021-02-22

## 2019-12-10 ASSESSMENT — ENCOUNTER SYMPTOMS
SHORTNESS OF BREATH: 0
BACK PAIN: 0
BLOOD IN STOOL: 0
VOMITING: 1
ABDOMINAL PAIN: 1
WHEEZING: 0
VOICE CHANGE: 0
NAUSEA: 1
PHOTOPHOBIA: 0
COLOR CHANGE: 0
TROUBLE SWALLOWING: 0
FACIAL SWELLING: 0
STRIDOR: 0

## 2019-12-11 LAB
EKG ATRIAL RATE: 74 BPM
EKG DIAGNOSIS: NORMAL
EKG P AXIS: 26 DEGREES
EKG P-R INTERVAL: 138 MS
EKG Q-T INTERVAL: 384 MS
EKG QRS DURATION: 114 MS
EKG QTC CALCULATION (BAZETT): 426 MS
EKG R AXIS: -3 DEGREES
EKG T AXIS: -20 DEGREES
EKG VENTRICULAR RATE: 74 BPM

## 2019-12-11 PROCEDURE — 93010 ELECTROCARDIOGRAM REPORT: CPT | Performed by: INTERNAL MEDICINE

## 2020-01-06 RX ORDER — LISINOPRIL AND HYDROCHLOROTHIAZIDE 12.5; 1 MG/1; MG/1
TABLET ORAL
Qty: 30 TABLET | Refills: 0 | Status: SHIPPED
Start: 2020-01-06 | End: 2020-03-09 | Stop reason: SINTOL

## 2020-02-28 ENCOUNTER — TELEPHONE (OUTPATIENT)
Dept: OTHER | Age: 50
End: 2020-02-28

## 2020-03-09 ENCOUNTER — OFFICE VISIT (OUTPATIENT)
Dept: PRIMARY CARE CLINIC | Age: 50
End: 2020-03-09
Payer: COMMERCIAL

## 2020-03-09 VITALS
SYSTOLIC BLOOD PRESSURE: 134 MMHG | HEIGHT: 70 IN | OXYGEN SATURATION: 95 % | RESPIRATION RATE: 18 BRPM | BODY MASS INDEX: 32.1 KG/M2 | WEIGHT: 224.2 LBS | HEART RATE: 76 BPM | DIASTOLIC BLOOD PRESSURE: 86 MMHG | TEMPERATURE: 98.1 F

## 2020-03-09 PROCEDURE — G8484 FLU IMMUNIZE NO ADMIN: HCPCS | Performed by: NURSE PRACTITIONER

## 2020-03-09 PROCEDURE — G8427 DOCREV CUR MEDS BY ELIG CLIN: HCPCS | Performed by: NURSE PRACTITIONER

## 2020-03-09 PROCEDURE — 99407 BEHAV CHNG SMOKING > 10 MIN: CPT | Performed by: NURSE PRACTITIONER

## 2020-03-09 PROCEDURE — 99215 OFFICE O/P EST HI 40 MIN: CPT | Performed by: NURSE PRACTITIONER

## 2020-03-09 PROCEDURE — 1036F TOBACCO NON-USER: CPT | Performed by: NURSE PRACTITIONER

## 2020-03-09 PROCEDURE — G8417 CALC BMI ABV UP PARAM F/U: HCPCS | Performed by: NURSE PRACTITIONER

## 2020-03-09 RX ORDER — BLOOD PRESSURE TEST KIT
1 KIT MISCELLANEOUS DAILY
Qty: 1 KIT | Refills: 0 | Status: SHIPPED | OUTPATIENT
Start: 2020-03-09 | End: 2021-02-22

## 2020-03-09 RX ORDER — TRAZODONE HYDROCHLORIDE 50 MG/1
50 TABLET ORAL NIGHTLY
Qty: 30 TABLET | Refills: 0 | Status: SHIPPED | OUTPATIENT
Start: 2020-03-09 | End: 2020-04-06

## 2020-03-09 RX ORDER — LISINOPRIL 10 MG/1
10 TABLET ORAL DAILY
Qty: 30 TABLET | Refills: 0 | Status: SHIPPED | OUTPATIENT
Start: 2020-03-09 | End: 2020-04-06

## 2020-03-09 RX ORDER — BUPROPION HYDROCHLORIDE 150 MG/1
150 TABLET ORAL EVERY MORNING
Qty: 30 TABLET | Refills: 0 | Status: SHIPPED | OUTPATIENT
Start: 2020-03-09 | End: 2020-04-06

## 2020-03-09 ASSESSMENT — ENCOUNTER SYMPTOMS
WHEEZING: 0
COUGH: 0
ABDOMINAL PAIN: 0
VOMITING: 0
SINUS PAIN: 0
DIARRHEA: 0
CHEST TIGHTNESS: 0
SHORTNESS OF BREATH: 0
SORE THROAT: 0
FACIAL SWELLING: 0
TROUBLE SWALLOWING: 0
EYE PAIN: 0
NAUSEA: 0

## 2020-03-09 NOTE — PATIENT INSTRUCTIONS
Chronic Pain: Care Instructions  Your Care Instructions    Chronic pain is pain that lasts a long time (months or even years) and may or may not have a clear cause. It is different from acute pain, which usually does have a clear cause--like an injury or illness--and gets better over time. Chronic pain:  · Lasts over time but may vary from day to day. · Does not go away despite efforts to end it. · May disrupt your sleep and lead to fatigue. · May cause depression or anxiety. · May make your muscles tense, causing more pain. · Can disrupt your work, hobbies, home life, and relationships with friends and family. Chronic pain is a very real condition. It is not just in your head. Treatment can help and usually includes several methods used together, such as medicines, physical therapy, exercise, and other treatments. Learning how to relax and changing negative thought patterns can also help you cope. Chronic pain is complex. Taking an active role in your treatment will help you better manage your pain. Tell your doctor if you have trouble dealing with your pain. You may have to try several things before you find what works best for you. Follow-up care is a key part of your treatment and safety. Be sure to make and go to all appointments, and call your doctor if you are having problems. It's also a good idea to know your test results and keep a list of the medicines you take. How can you care for yourself at home? · Pace yourself. Break up large jobs into smaller tasks. Save harder tasks for days when you have less pain, or go back and forth between hard tasks and easier ones. Take rest breaks. · Relax, and reduce stress. Relaxation techniques such as deep breathing or meditation can help. · Keep moving. Gentle, daily exercise can help reduce pain over the long run. Try low- or no-impact exercises such as walking, swimming, and stationary biking. Do stretches to stay flexible.   · Try heat, cold packs, and massage. · Get enough sleep. Chronic pain can make you tired and drain your energy. Talk with your doctor if you have trouble sleeping because of pain. · Think positive. Your thoughts can affect your pain level. Do things that you enjoy to distract yourself when you have pain instead of focusing on the pain. See a movie, read a book, listen to music, or spend time with a friend. · If you think you are depressed, talk to your doctor about treatment. · Keep a daily pain diary. Record how your moods, thoughts, sleep patterns, activities, and medicine affect your pain. You may find that your pain is worse during or after certain activities or when you are feeling a certain emotion. Having a record of your pain can help you and your doctor find the best ways to treat your pain. · Take pain medicines exactly as directed. ? If the doctor gave you a prescription medicine for pain, take it as prescribed. ? If you are not taking a prescription pain medicine, ask your doctor if you can take an over-the-counter medicine. Reducing constipation caused by pain medicine  · Include fruits, vegetables, beans, and whole grains in your diet each day. These foods are high in fiber. · Drink plenty of fluids, enough so that your urine is light yellow or clear like water. If you have kidney, heart, or liver disease and have to limit fluids, talk with your doctor before you increase the amount of fluids you drink. · If your doctor recommends it, get more exercise. Walking is a good choice. Bit by bit, increase the amount you walk every day. Try for at least 30 minutes on most days of the week. · Schedule time each day for a bowel movement. A daily routine may help. Take your time and do not strain when having a bowel movement. When should you call for help?   Call your doctor now or seek immediate medical care if:    · Your pain gets worse or is out of control.     · You feel down or blue, or you do not enjoy things like you once did. You may be depressed, which is common in people with chronic pain. Depression can be treated.     · You have vomiting or cramps for more than 2 hours.    Watch closely for changes in your health, and be sure to contact your doctor if:    · You cannot sleep because of pain.     · You are very worried or anxious about your pain.     · You have trouble taking your pain medicine.     · You have any concerns about your pain medicine.     · You have trouble with bowel movements, such as:  ? No bowel movement in 3 days. ? Blood in the anal area, in your stool, or on the toilet paper. ? Diarrhea for more than 24 hours. Where can you learn more? Go to https://Surgient.Neiron. org and sign in to your [facility] account. Enter N004 in the Organic Society box to learn more about \"Chronic Pain: Care Instructions. \"     If you do not have an account, please click on the \"Sign Up Now\" link. Current as of: March 28, 2019  Content Version: 12.3  © 3850-1016 [employer] Reviewed. Care instructions adapted under license by [facility] ([facility] ([facility]). If you have questions about a medical condition or this instruction, always ask your healthcare professional. [place] [address] any warranty or liability for your use of this information. Patient Education        Recovering From Depression: Care Instructions  Your Care Instructions    Taking good care of yourself is important as you recover from depression. In time, your symptoms will fade as your treatment takes hold. Do not give up. Instead, focus your energy on getting better. Your mood will improve. It just takes some time. Focus on things that can help you feel better, such as being with friends and family, eating well, and getting enough rest. But take things slowly. Do not do too much too soon. You will begin to feel better gradually. Follow-up care is a key part of your treatment and safety.  Be sure to make and go to all appointments, and call your doctor if you are having problems. It's also a good idea to know your test results and keep a list of the medicines you take. How can you care for yourself at home? Be realistic  · If you have a large task to do, break it up into smaller steps you can handle, and just do what you can. · You may want to put off important decisions until your depression has lifted. If you have plans that will have a major impact on your life, such as marriage, divorce, or a job change, try to wait a bit. Talk it over with friends and loved ones who can help you look at the overall picture first.  · Reaching out to people for help is important. Do not isolate yourself. Let your family and friends help you. Find someone you can trust and confide in, and talk to that person. · Be patient, and be kind to yourself. Remember that depression is not your fault and is not something you can overcome with willpower alone. Treatment is necessary for depression, just like for any other illness. Feeling better takes time, and your mood will improve little by little. Stay active  · Stay busy and get outside. Take a walk, or try some other light exercise. · Talk with your doctor about an exercise program. Exercise can help with mild depression. · Go to a movie or concert. Take part in a Christianity activity or other social gathering. Go to a ball game. · Ask a friend to have dinner with you. Take care of yourself  · Eat a balanced diet with plenty of fresh fruits and vegetables, whole grains, and lean protein. If you have lost your appetite, eat small snacks rather than large meals. · Avoid drinking alcohol or using illegal drugs. Do not take medicines that have not been prescribed for you. They may interfere with medicines you may be taking for depression, or they may make your depression worse. · Take your medicines exactly as they are prescribed.  You may start to feel better within 1 to 3 weeks of taking antidepressant depression and:  ? Starts to give away his or her possessions. ? Uses illegal drugs or drinks alcohol heavily. ? Talks or writes about death, including writing suicide notes or talking about guns, knives, or pills. ? Starts to spend a lot of time alone. ? Acts very aggressively or suddenly appears calm.    Watch closely for changes in your health, and be sure to contact your doctor if:    · You do not get better as expected. Where can you learn more? Go to https://[employer].Programmr. org and sign in to your [employer] account. Enter P832 in the [employer] box to learn more about \"Recovering From Depression: Care Instructions. \"     If you do not have an account, please click on the \"Sign Up Now\" link. Current as of: May 28, 2019  Content Version: 12.3  © 8990-8738 Healthwise, BeMyGuest. Care instructions adapted under license by [facility] ([facility]). If you have questions about a medical condition or this instruction, always ask your healthcare professional. [place] [address] any warranty or liability for your use of this information. Patient Education        High Blood Pressure: Care Instructions  Overview    It's normal for blood pressure to go up and down throughout the day. But if it stays up, you have high blood pressure. Another name for high blood pressure is hypertension. Despite what a lot of people think, high blood pressure usually doesn't cause headaches or make you feel dizzy or lightheaded. It usually has no symptoms. But it does increase your risk of stroke, heart attack, and other problems. You and your doctor will talk about your risks of these problems based on your blood pressure. Your doctor will give you a goal for your blood pressure. Your goal will be based on your health and your age. Lifestyle changes, such as eating healthy and being active, are always important to help lower blood pressure.  You might also take medicine to reach your blood pressure goal.  Follow-up care is a key part of your treatment and safety. Be sure to make and go to all appointments, and call your doctor if you are having problems. It's also a good idea to know your test results and keep a list of the medicines you take. How can you care for yourself at home? Medical treatment  · If you stop taking your medicine, your blood pressure will go back up. You may take one or more types of medicine to lower your blood pressure. Be safe with medicines. Take your medicine exactly as prescribed. Call your doctor if you think you are having a problem with your medicine. · Talk to your doctor before you start taking aspirin every day. Aspirin can help certain people lower their risk of a heart attack or stroke. But taking aspirin isn't right for everyone, because it can cause serious bleeding. · See your doctor regularly. You may need to see the doctor more often at first or until your blood pressure comes down. · If you are taking blood pressure medicine, talk to your doctor before you take decongestants or anti-inflammatory medicine, such as ibuprofen. Some of these medicines can raise blood pressure. · Learn how to check your blood pressure at home. Lifestyle changes  · Stay at a healthy weight. This is especially important if you put on weight around the waist. Losing even 10 pounds can help you lower your blood pressure. · If your doctor recommends it, get more exercise. Walking is a good choice. Bit by bit, increase the amount you walk every day. Try for at least 30 minutes on most days of the week. You also may want to swim, bike, or do other activities. · Avoid or limit alcohol. Talk to your doctor about whether you can drink any alcohol. · Try to limit how much sodium you eat to less than 2,300 milligrams (mg) a day. Your doctor may ask you to try to eat less than 1,500 mg a day.   · Eat plenty of fruits (such as bananas and oranges), vegetables, legumes, whole grains, and low-fat dairy products. · Lower the amount of saturated fat in your diet. Saturated fat is found in animal products such as milk, cheese, and meat. Limiting these foods may help you lose weight and also lower your risk for heart disease. · Do not smoke. Smoking increases your risk for heart attack and stroke. If you need help quitting, talk to your doctor about stop-smoking programs and medicines. These can increase your chances of quitting for good. When should you call for help? Call  911 anytime you think you may need emergency care. This may mean having symptoms that suggest that your blood pressure is causing a serious heart or blood vessel problem. Your blood pressure may be over 180/120.   For example, call  911 if:    · You have symptoms of a heart attack. These may include:  ? Chest pain or pressure, or a strange feeling in the chest.  ? Sweating. ? Shortness of breath. ? Nausea or vomiting. ? Pain, pressure, or a strange feeling in the back, neck, jaw, or upper belly or in one or both shoulders or arms. ? Lightheadedness or sudden weakness. ? A fast or irregular heartbeat.     · You have symptoms of a stroke. These may include:  ? Sudden numbness, tingling, weakness, or loss of movement in your face, arm, or leg, especially on only one side of your body. ? Sudden vision changes. ? Sudden trouble speaking. ? Sudden confusion or trouble understanding simple statements. ? Sudden problems with walking or balance. ? A sudden, severe headache that is different from past headaches.     · You have severe back or belly pain.    Do not wait until your blood pressure comes down on its own.  Get help right away.   Call your doctor now or seek immediate care if:    · Your blood pressure is much higher than normal (such as 180/120 or higher), but you don't have symptoms.     · You think high blood pressure is causing symptoms, such as:  ? Severe headache.  ? Blurry vision.    Watch closely for changes in your health, and be sure to contact your doctor if:    · Your blood pressure measures higher than your doctor recommends at least 2 times. That means the top number is higher or the bottom number is higher, or both.     · You think you may be having side effects from your blood pressure medicine. Where can you learn more? Go to https://chpepicewtam.Validas. org and sign in to your [employer] account. Enter N143 in the Painting With A Twist box to learn more about \"High Blood Pressure: Care Instructions. \"     If you do not have an account, please click on the \"Sign Up Now\" link. Current as of: April 9, 2019  Content Version: 12.3  © 3122-9749 Healthwise, Incorporated. Care instructions adapted under license by [facility] ([facility]). If you have questions about a medical condition or this instruction, always ask your healthcare professional. [address] any warranty or liability for your use of this information.

## 2020-03-09 NOTE — PROGRESS NOTES
3/9/2020    Lupillo Casarez (:  1970) gerry 52 y.o. male, here for evaluation of the following medical concerns:    HPI     Pt comes to clinic to establish care. Pt was a former of Dr. Destiney Mckenzie. Pt has pmhx of insomnia, depression, htn, right shoulder pain, chronic pain. Patient reports that he had two previous surgeries on shoulder. HTN- BP has been stable. Pt has not taken lisinopril-hctz 12.5mg  for over 2 weeks because he \"couldn't control his bladder\". Pt is not checking his blood pressure. Patient is not watching his diet or exercising. Denies headache or vision changes. Smoking cessation- Patient smokes 1 ppd. He has has had few attempts in the past to stop smoking. Denies cough,SOB, or wheezing. Hypothyroidism- Patient has not been taking levothyroxine. His last TSH was 8. He does experience fatigue and depression, but that seems to be multifactorial.     Insomnia-Ongoing for 1 year, had surgery on April. Attributes to chronic pain and depression. Tore supraspinatus and left rotator cuff at work ( followed [employer]). Patient denies snoring. Mother had hx of schizophrenia. He also follows up depression. Current symptoms include anhedonia, depressed mood, difficulty concentrating, fatigue, feelings of worthlessness/guilt, hopelessness and insomnia. Symptoms have been gradually worsening since that time. Patient denies psychomotor agitation, psychomotor retardation, recurrent thoughts of death, suicidal attempt, suicidal thoughts with specific plan, suicidal thoughts without plan and weight gain. Previous treatment includes: medication. He complains of the following side effects from the treatment: none. He has not made appt with psych, does not particularly want to do. Chronic pain- Patient reports that he has chronic neck and upper back issues. Reports that he has worker's comp for left shoulder issues. He attributes pain issues to much of his insomnia.  SOPHIA and sed rate kavya by Dr. Kalyan Hurst were normal. Patient denies having any imaging done for several years. Patient     Low T- Pt's last lab for testosterone was high. Pt denies taking exogenous steroids. States that should be a false result because he has \"never had high testosterone\"    HLD- Pt has poor diet and does not exercise. Review of Systems   Constitutional: Positive for fatigue. Negative for chills and fever. HENT: Negative for congestion, ear pain, facial swelling, sinus pain, sore throat and trouble swallowing. Eyes: Negative for pain and visual disturbance. Respiratory: Negative for cough, chest tightness, shortness of breath and wheezing. Cardiovascular: Negative for chest pain, palpitations and leg swelling. Gastrointestinal: Negative for abdominal pain, diarrhea, nausea and vomiting. Endocrine: Negative for polydipsia and polyuria. Genitourinary: Negative for difficulty urinating and hematuria. Musculoskeletal: Positive for arthralgias and myalgias. Skin: Negative for pallor and rash. Allergic/Immunologic: Negative for environmental allergies and food allergies. Neurological: Negative for dizziness, syncope, weakness, numbness and headaches. Hematological: Negative for adenopathy. Does not bruise/bleed easily. Psychiatric/Behavioral: Positive for behavioral problems, dysphoric mood and sleep disturbance. Negative for suicidal ideas. The patient is nervous/anxious. Prior to Visit Medications    Medication Sig Taking?  Authorizing Provider   lisinopril (PRINIVIL;ZESTRIL) 10 MG tablet Take 1 tablet by mouth daily Yes SANTA Neumann CNP   buPROPion (WELLBUTRIN XL) 150 MG extended release tablet Take 1 tablet by mouth every morning Yes SANTA Neumann CNP   traZODone (DESYREL) 50 MG tablet Take 1 tablet by mouth nightly Yes SANTA Neumann CNP   Blood Pressure KIT 1 kit by Does not apply route daily Yes SANTA Neumann CNP   nicotine polacrilex connections     Talks on phone: Not on file     Gets together: Not on file     Attends Mu-ism service: Not on file     Active member of club or organization: Not on file     Attends meetings of clubs or organizations: Not on file     Relationship status: Not on file    Intimate partner violence     Fear of current or ex partner: Not on file     Emotionally abused: Not on file     Physically abused: Not on file     Forced sexual activity: Not on file   Other Topics Concern    Not on file   Social History Narrative    Not on file        History reviewed. No pertinent family history. Vitals:    03/09/20 1017   BP: 134/86   Site: Left Upper Arm   Position: Sitting   Cuff Size: Large Adult   Pulse: 76   Resp: 18   Temp: 98.1 °F (36.7 °C)   TempSrc: Oral   SpO2: 95%   Weight: 224 lb 3.2 oz (101.7 kg)   Height: 5' 9.5\" (1.765 m)     Estimated body mass index is 32.63 kg/m² as calculated from the following:    Height as of this encounter: 5' 9.5\" (1.765 m). Weight as of this encounter: 224 lb 3.2 oz (101.7 kg). Physical Exam  Vitals signs reviewed. Constitutional:       Appearance: He is well-developed. HENT:      Right Ear: External ear normal.      Left Ear: External ear normal.      Nose: Nose normal.   Eyes:      Conjunctiva/sclera: Conjunctivae normal.      Pupils: Pupils are equal, round, and reactive to light. Neck:      Musculoskeletal: Normal range of motion and neck supple. Thyroid: No thyromegaly. Cardiovascular:      Rate and Rhythm: Normal rate and regular rhythm. Heart sounds: Normal heart sounds. Pulmonary:      Effort: Pulmonary effort is normal.      Breath sounds: Normal breath sounds. Abdominal:      General: Bowel sounds are normal.      Palpations: Abdomen is soft. Musculoskeletal:      Right shoulder: He exhibits tenderness, pain and spasm. Left shoulder: He exhibits decreased range of motion, tenderness, pain and spasm.       Cervical back: He exhibits tenderness, pain and spasm. Thoracic back: He exhibits tenderness, pain and spasm. Skin:     General: Skin is warm and dry. Capillary Refill: Capillary refill takes less than 2 seconds. Neurological:      Mental Status: He is alert and oriented to person, place, and time. Psychiatric:         Attention and Perception: He is inattentive. Mood and Affect: Mood is anxious. Affect is flat. Speech: Speech is tangential.         Behavior: Behavior normal. Behavior is cooperative. Thought Content: Thought content normal.         Cognition and Memory: Cognition normal.         Judgment: Judgment normal.         ASSESSMENT/PLAN:  1. Essential hypertension  -Stable  -Keep BP logs  -removed hctz d/t urinary complaints  - lisinopril (PRINIVIL;ZESTRIL) 10 MG tablet; Take 1 tablet by mouth daily  Dispense: 30 tablet; Refill: 0  - Blood Pressure KIT; 1 kit by Does not apply route daily  Dispense: 1 kit; Refill: 0    2. Depression, unspecified depression type  -Discussed with pt that I do not maintain benzodiazepine prescriptions for new patients. Handouts describing disease, natural history, and treatment were given to the patient. Reviewed concept of anxiety as biochemical imbalance of neurotransmitters and rationale for treatment. Instructed patient to contact office or on-call physician promptly should condition worsen or any new symptoms appear and provided on-call telephone numbers. IF THE PATIENT HAS ANY SUICIDAL OR HOMICIDAL IDEATIONS, CALL THE OFFICE, DISCUSS WITH A SUPPORT MEMBER, OR GO TO THE ER IMMEDIATELY. Patient was agreeable with this plan. - buPROPion (WELLBUTRIN XL) 150 MG extended release tablet; Take 1 tablet by mouth every morning  Dispense: 30 tablet; Refill: 0  - External Referral to Psychiatry    3. Insomnia, unspecified type  -Discussed sleep hygiene  - traZODone (DESYREL) 50 MG tablet; Take 1 tablet by mouth nightly  Dispense: 30 tablet; Refill: 0    4.  Low testosterone in male  Refer for low T treatment  - Derek Lundberg MD, Endocrinology, [facility]    5. Neck pain    - XR CERVICAL SPINE (2-3 VIEWS); Future  - Joaquín Smith MD, Orthopedic Surgery, [facility]    6. Chronic left-sided thoracic back pain    - XR THORACIC SPINE (2 VIEWS); Future    7. Tobacco dependence    - nicotine polacrilex (NICORETTE) 2 MG gum; Take 1 each by mouth as needed for Smoking cessation (no more than 9 tablets a day)  Dispense: 110 each; Refill: 3    8. Encounter for smoking cessation counseling  Patient was counseled on tobacco cessation. Based upon patient's motivation to change his behavior, the following plan was agreed upon: patient will try the following tobacco cessation strategies:  Chantix: risks and benefits of this medication discussed- patient will call for any significant adverse effects. He was provided with a list of local tobacco cessation resources. Provider spent 12 minutes counseling patient. - nicotine polacrilex (NICORETTE) 2 MG gum; Take 1 each by mouth as needed for Smoking cessation (no more than 9 tablets a day)  Dispense: 110 each; Refill: 3      Return in about 4 weeks (around 4/6/2020) for Re-evaluation depression, HTN , smoking cessation .

## 2020-03-11 ENCOUNTER — TELEPHONE (OUTPATIENT)
Dept: PRIMARY CARE CLINIC | Age: 50
End: 2020-03-11

## 2020-03-11 NOTE — TELEPHONE ENCOUNTER
Patient called to say he is unable to get an apt w/endocrinologist until 7/30/2020. Wants to know if he can get an order from PCP to have testosterone levels checked. Please call pt to advise.

## 2020-04-16 ENCOUNTER — TELEPHONE (OUTPATIENT)
Dept: ORTHOPEDIC SURGERY | Age: 50
End: 2020-04-16

## 2021-02-17 ENCOUNTER — TELEPHONE (OUTPATIENT)
Dept: ORTHOPEDIC SURGERY | Age: 51
End: 2021-02-17

## 2021-02-17 NOTE — TELEPHONE ENCOUNTER
ALL PATIENT NEEDS TO SCHEDULE AN APPOINTMENT IS AUTHORIZATION FROM [employer]. MAMADOU HAS MADE MANY ATTEMPTS TO GET THIS AUTH. SHE HAS CALLED MUSTAPHA ADORNO AT [employer]/ SHE HAS FAXED TO HER WITH NO RESPONSE. WE HAVE EXPLAINED THIS TO MRS JOSEPH AND MR JOSEPH THIS IS ALL WE NEED TO GET HIM SCHEDULED. THEY HAVE A COURT ORDER APPROVED FOR SURGERY AND SURGERY ONLY. THIS IS NOT AUTH FOR AN OFFICE VISIT. MRS JOSEPH CALLED TODAY STATED SHE WOULD LIKE US TO WRITE A NOTE STATING THAT WE ARE REFUSING TO TREAT MR JOSEPH WHICH IS NOT TRUE, ALL WE NEED IS THE AUTH FROM [employer].  WE ALWAYS DO THIS TO MAKE SURE OFFICE VISIT X-RAYS AND TREATMENT WILL BE COVERED AND HE WILL NOT BE HELD ACCOUNTABLE FOR THIS.

## 2021-02-22 ENCOUNTER — OFFICE VISIT (OUTPATIENT)
Dept: FAMILY MEDICINE CLINIC | Age: 51
End: 2021-02-22
Payer: COMMERCIAL

## 2021-02-22 VITALS
SYSTOLIC BLOOD PRESSURE: 166 MMHG | DIASTOLIC BLOOD PRESSURE: 102 MMHG | WEIGHT: 237.8 LBS | RESPIRATION RATE: 14 BRPM | OXYGEN SATURATION: 96 % | TEMPERATURE: 97.5 F | BODY MASS INDEX: 34.04 KG/M2 | HEIGHT: 70 IN | HEART RATE: 80 BPM

## 2021-02-22 DIAGNOSIS — Z13.1 SCREENING FOR DIABETES MELLITUS (DM): ICD-10-CM

## 2021-02-22 DIAGNOSIS — R07.9 CHEST PAIN, UNSPECIFIED TYPE: Primary | ICD-10-CM

## 2021-02-22 DIAGNOSIS — I10 ESSENTIAL HYPERTENSION: ICD-10-CM

## 2021-02-22 DIAGNOSIS — R79.89 LOW TESTOSTERONE IN MALE: ICD-10-CM

## 2021-02-22 DIAGNOSIS — E03.9 HYPOTHYROIDISM, UNSPECIFIED TYPE: ICD-10-CM

## 2021-02-22 DIAGNOSIS — G89.29 CHRONIC LEFT SHOULDER PAIN: ICD-10-CM

## 2021-02-22 DIAGNOSIS — Z13.220 SCREENING FOR LIPID DISORDERS: ICD-10-CM

## 2021-02-22 DIAGNOSIS — M25.512 CHRONIC LEFT SHOULDER PAIN: ICD-10-CM

## 2021-02-22 PROCEDURE — 93000 ELECTROCARDIOGRAM COMPLETE: CPT | Performed by: INTERNAL MEDICINE

## 2021-02-22 PROCEDURE — G8427 DOCREV CUR MEDS BY ELIG CLIN: HCPCS | Performed by: INTERNAL MEDICINE

## 2021-02-22 PROCEDURE — 99204 OFFICE O/P NEW MOD 45 MIN: CPT | Performed by: INTERNAL MEDICINE

## 2021-02-22 PROCEDURE — 1036F TOBACCO NON-USER: CPT | Performed by: INTERNAL MEDICINE

## 2021-02-22 PROCEDURE — G8484 FLU IMMUNIZE NO ADMIN: HCPCS | Performed by: INTERNAL MEDICINE

## 2021-02-22 PROCEDURE — G8417 CALC BMI ABV UP PARAM F/U: HCPCS | Performed by: INTERNAL MEDICINE

## 2021-02-22 RX ORDER — BLOOD PRESSURE TEST KIT
1 KIT MISCELLANEOUS DAILY
Qty: 1 KIT | Refills: 0 | Status: SHIPPED | OUTPATIENT
Start: 2021-02-22 | End: 2021-12-03

## 2021-02-22 RX ORDER — METOPROLOL TARTRATE 100 MG/1
100 TABLET ORAL 2 TIMES DAILY
Qty: 60 TABLET | Refills: 0 | Status: SHIPPED | OUTPATIENT
Start: 2021-02-22 | End: 2021-03-22

## 2021-02-22 RX ORDER — LISINOPRIL 10 MG/1
TABLET ORAL
Qty: 90 TABLET | Refills: 0 | Status: SHIPPED | OUTPATIENT
Start: 2021-02-22 | End: 2021-05-26

## 2021-02-22 SDOH — ECONOMIC STABILITY: TRANSPORTATION INSECURITY
IN THE PAST 12 MONTHS, HAS LACK OF TRANSPORTATION KEPT YOU FROM MEETINGS, WORK, OR FROM GETTING THINGS NEEDED FOR DAILY LIVING?: NO

## 2021-02-22 SDOH — ECONOMIC STABILITY: FOOD INSECURITY: WITHIN THE PAST 12 MONTHS, YOU WORRIED THAT YOUR FOOD WOULD RUN OUT BEFORE YOU GOT MONEY TO BUY MORE.: NEVER TRUE

## 2021-02-22 SDOH — ECONOMIC STABILITY: TRANSPORTATION INSECURITY
IN THE PAST 12 MONTHS, HAS THE LACK OF TRANSPORTATION KEPT YOU FROM MEDICAL APPOINTMENTS OR FROM GETTING MEDICATIONS?: NO

## 2021-02-22 SDOH — ECONOMIC STABILITY: FOOD INSECURITY: WITHIN THE PAST 12 MONTHS, THE FOOD YOU BOUGHT JUST DIDN'T LAST AND YOU DIDN'T HAVE MONEY TO GET MORE.: NEVER TRUE

## 2021-02-22 ASSESSMENT — ENCOUNTER SYMPTOMS
NAUSEA: 0
SHORTNESS OF BREATH: 1
WHEEZING: 1
DIARRHEA: 0
VOMITING: 0
CONSTIPATION: 0
COLOR CHANGE: 0
EYE PAIN: 0

## 2021-02-22 NOTE — PROGRESS NOTES
2/22/2021    Chief Complaint   Patient presents with    New Patient       HPI  New pt to get established. Does not drive much because of his shoulder. Has torn supraspinatus tendon . Had surgery last feb in [place] with workman comp. Still waiting to find out who he can go to .    htn   Not on medication     \"you have the screen\"  2014 had HTN  Off med     Takes testosterone - gets from [facility]"      Sometimes drinks alcohol and smokes marijuana  \"I don't have a doctor\"    Has chest pain with lying down . Standing up   Smoked marijuana last night and did not feel it  2 nights ago had chest pain sometimes lying in bed  \"feels like my heart stops and I can't breath\"  Skipped beat  Had sharp pain in the chest for one second  Few times last  2 weeks  Not everyday  Does not have heart dx  No early heart dx in the family  No pain in the arm or jaw    Has shooting pain in both arms but does not think that is related. Very anxious      Said the medicine that he was given   \"did not work\"  Said zoloft and wellbutrin did not help. Eats well  Exercise  Was working at [employer]  Has not worked for 2 yrs  bc of shoulder    Has 5 children  2 grown    Youngest 12 yo  Wife working  Hoping to go back to work      Review of Systems   Constitutional: Positive for fatigue. Negative for unexpected weight change. HENT: Positive for hearing loss. Negative for tinnitus. Eyes: Positive for visual disturbance. Negative for pain. Respiratory: Positive for shortness of breath and wheezing. Cardiovascular: Positive for chest pain. Negative for palpitations and leg swelling. Gastrointestinal: Negative for constipation, diarrhea, nausea and vomiting. Endocrine: Negative for cold intolerance and heat intolerance. Genitourinary: Negative for dysuria and frequency. Musculoskeletal: Negative for gait problem and joint swelling. Skin: Negative for color change and rash. Neurological: Negative for dizziness and headaches. Psychiatric/Behavioral: Positive for dysphoric mood and sleep disturbance. Negative for suicidal ideas. The patient is nervous/anxious. Not homicidal         Health Maintenance   Topic Date Due    Flu vaccine (1) 09/01/2020    TSH testing  02/28/2021    Potassium monitoring  02/28/2021    Creatinine monitoring  02/28/2021    Lipid screen  02/28/2025    DTaP/Tdap/Td vaccine (2 - Tdap) 09/14/2025    Hepatitis C screen  Completed    HIV screen  Completed    Hepatitis A vaccine  Aged Out    Hepatitis B vaccine  Aged Out    Hib vaccine  Aged Out    Meningococcal (ACWY) vaccine  Aged Out    Pneumococcal 0-64 years Vaccine  Aged Out      Social History     Tobacco Use    Smoking status: Former Smoker     Types: Cigarettes    Smokeless tobacco: Never Used   Substance Use Topics    Alcohol use: Yes     Frequency: Monthly or less     Comment: occoasional use     Drug use: Yes     Types: Marijuana     Comment: for pain and anxiety. History reviewed. No pertinent family history.   Prior to Visit Medications    Not on File     Patient Active Problem List   Diagnosis    Low testosterone in male    Fatigue    Hypothyroidism    Insomnia    Depression    Hypertension        LABS:   Lab Results   Component Value Date    GLUCOSE 94 02/28/2020     Lab Results   Component Value Date     02/28/2020    K 3.9 02/28/2020    CREATININE 0.9 02/28/2020     Cholesterol, Total   Date Value Ref Range Status   02/28/2020 177 0 - 199 mg/dL Final     LDL Calculated   Date Value Ref Range Status   02/28/2020 115 (H) <100 mg/dL Final     HDL   Date Value Ref Range Status   02/28/2020 37 (L) 40 - 60 mg/dL Final     Triglycerides   Date Value Ref Range Status   02/28/2020 124 0 - 150 mg/dL Final     Lab Results   Component Value Date    ALT 82 (H) 02/28/2020    AST 46 (H) 02/28/2020    ALKPHOS 118 02/28/2020    BILITOT 0.5 02/28/2020      Lab Results Component Value Date    WBC 5.5 02/28/2020    HGB 15.2 02/28/2020    HCT 44.0 02/28/2020    MCV 86.0 02/28/2020     02/28/2020     TSH (uIU/mL)   Date Value   02/28/2020 8.46 (H)     Lab Results   Component Value Date    LABA1C 4.8 04/14/2017     Lab Results   Component Value Date    PSA 0.51 04/14/2017        PHYSICAL EXAM:  BP (!) 166/102   Pulse 80   Temp 97.5 °F (36.4 °C)   Resp 14   Ht 5' 9.5\" (1.765 m)   Wt 237 lb 12.8 oz (107.9 kg)   SpO2 96%   BMI 34.61 kg/m²    Physical Exam  Constitutional:       Appearance: Normal appearance. He is well-developed. He is obese. HENT:      Head: Normocephalic and atraumatic. Right Ear: Tympanic membrane and ear canal normal.      Left Ear: Tympanic membrane and ear canal normal.   Eyes:      General: No scleral icterus. Extraocular Movements: Extraocular movements intact. Conjunctiva/sclera: Conjunctivae normal.   Neck:      Musculoskeletal: Neck supple. Thyroid: No thyromegaly. Cardiovascular:      Rate and Rhythm: Normal rate and regular rhythm. Pulses: Normal pulses. Heart sounds: Normal heart sounds. No murmur. Pulmonary:      Effort: Pulmonary effort is normal. No respiratory distress. Breath sounds: Normal breath sounds. No wheezing. Abdominal:      General: There is no distension. Palpations: Abdomen is soft. Tenderness: There is no abdominal tenderness. Musculoskeletal:         General: No swelling or deformity. Skin:     General: Skin is warm and dry. Findings: No rash. Neurological:      General: No focal deficit present. Mental Status: He is alert. Motor: No abnormal muscle tone. Comments: Motor  5/5 right upper ext and lower ext bilat  Difficulty with left upper ext   Psychiatric:         Mood and Affect: Mood normal.         Behavior: Behavior normal.         Thought Content:  Thought content normal.         Judgment: Judgment normal. BP Readings from Last 5 Encounters:   02/22/21 (!) 166/102   03/09/20 134/86   10/28/19 130/88   09/30/19 (!) 142/98   09/23/19 (!) 172/102       Wt Readings from Last 5 Encounters:   02/22/21 237 lb 12.8 oz (107.9 kg)   03/09/20 224 lb 3.2 oz (101.7 kg)   10/28/19 228 lb 3.2 oz (103.5 kg)   09/30/19 226 lb 6.4 oz (102.7 kg)   09/23/19 239 lb 6.4 oz (108.6 kg)      Ayala Mclaughlin was seen today for new patient. Diagnoses and all orders for this visit:    Chest pain, unspecified type  -     TROPONIN; Future  -     EKG 12 Lead  -     Cancel: CARDIAC STRESS TEST EXERCISE ONLY; Future  -     CARDIAC STRESS TEST EXERCISE ONLY; Future    Hypothyroidism, unspecified type  -     TSH with Reflex; Future    Essential hypertension  -     CBC Auto Differential; Future  -     Comprehensive Metabolic Panel; Future  -     lisinopril (PRINIVIL;ZESTRIL) 10 MG tablet; TAKE ONE TABLET BY MOUTH DAILY  -     Blood Pressure KIT; 1 kit by Does not apply route daily    Screening for lipid disorders  -     Lipid Panel; Future    Screening for diabetes mellitus (DM)  -     Hemoglobin A1C; Future    Low testosterone in male  -     AFL - Todd Harman MD, [facility], [facility]    Chronic left shoulder pain  -     Gaby Piedra MD, Orthopedic Surgery, [facility]    Other orders  -     metoprolol (LOPRESSOR) 100 MG tablet; Take 1 tablet by mouth 2 times daily      ekg   nsr rate  83  WY and QTC are ok  t  Abn-  3,avf  No change from dec 2019  bp much too high  Will restart lisinopril  Ok to double if he needs to  I asked him to monitor his bp  Fu in one week  Will have him get stress test for atypical chest pain  Will ck troponin  Gave a ref to urology for his ho low t. Has big issues with his left shoulder and workman's comp. Referral given if he is allowed to see ortho outside of workman's comp.

## 2021-03-22 RX ORDER — METOPROLOL TARTRATE 100 MG/1
TABLET ORAL
Qty: 60 TABLET | Refills: 0 | Status: SHIPPED | OUTPATIENT
Start: 2021-03-22 | End: 2021-12-30

## 2021-03-22 NOTE — TELEPHONE ENCOUNTER
I spoke to patient to advise he needs an appointment. Patient states he never had the cardiac stress test done so he did not see the point in coming in. I stressed the importance of scheduling the test and then making a follow-up appointment for a few days after.  Will send in a 30 day supply of medication to allow for time for testing

## 2021-05-20 DIAGNOSIS — I10 ESSENTIAL HYPERTENSION: ICD-10-CM

## 2021-05-26 ENCOUNTER — TELEPHONE (OUTPATIENT)
Dept: FAMILY MEDICINE CLINIC | Age: 51
End: 2021-05-26

## 2021-05-26 RX ORDER — LISINOPRIL 10 MG/1
TABLET ORAL
Qty: 30 TABLET | Refills: 0 | Status: SHIPPED | OUTPATIENT
Start: 2021-05-26 | End: 2021-07-09

## 2021-05-26 NOTE — TELEPHONE ENCOUNTER
Call and let him know that he needs labs done. These were ordered months ago. He is overdue for labs and this medication needs to be monitored with labs. It has been over a year.

## 2021-05-26 NOTE — TELEPHONE ENCOUNTER
Called pt to schedule a follow up med refill appointment and then he asked if he could get something to help him sleep. He says he hasn't slept good in the past 2 years and he just had surgery (shoulder related) yesterday and was wanting to get something to help him sleep.     Please Advise

## 2021-05-26 NOTE — TELEPHONE ENCOUNTER
[phone] (home)   Called pt and he is not on any narcotics. He is on a nerve block injector for his neck. He is to be using that for about 2 more days. He said he is not taking the pain meds they gave him since hes not in much pain. He just cannot sleep. He has not picked up the pain meds.

## 2021-05-26 NOTE — TELEPHONE ENCOUNTER
Tell him ,  I'm sorry but I can't recommend a sleep med since he is probably on meds post op from surgery. I would need to know exactly what he is taking.   If needed I could do a virtual visit

## 2021-06-21 ENCOUNTER — HOSPITAL ENCOUNTER (OUTPATIENT)
Dept: PHYSICAL THERAPY | Age: 51
Setting detail: THERAPIES SERIES
Discharge: HOME | End: 2021-06-21
Payer: COMMERCIAL

## 2021-06-21 PROCEDURE — 97161 PT EVAL LOW COMPLEX 20 MIN: CPT

## 2021-06-21 PROCEDURE — 97530 THERAPEUTIC ACTIVITIES: CPT

## 2021-06-21 NOTE — PLAN OF CARE
[facility]  [address] [facility]  Phone: [phone]   Fax:     [phone]                                                       Physical Therapy Certification    Dear Referring Practitioner: Dr. Ashley Gallegos,    We had the pleasure of evaluating the following patient for physical therapy services at [facility]. A summary of our findings can be found in the initial assessment below. This includes our plan of care. If you have any questions or concerns regarding these findings, please do not hesitate to contact me at the office phone number checked above. Thank you for the referral.       Physician Signature:_______________________________Date:__________________  By signing above (or electronic signature), therapists plan is approved by physician        Patient: Sergey Johnson   : 1970   MRN: [id number]  Referring Physician: Referring Practitioner: Dr. Ashley Gallegos      Evaluation Date: 2021      Medical Diagnosis Information:  Diagnosis: Z98.890 (ICD-10-CM) - Other specified postprocedural states   Treatment Diagnosis: L shoulder pain (M25.512)                                         Insurance information: PT Insurance Information: Lan Man[employer]    Precautions/ Contra-indications: HTN, Anxiety, rotator cuff repair DOS 21  Latex Allergy:   [x]  NO      []YES  Preferred Language for Healthcare:   [x]English       []other:    C-SSRS Triggered by Intake questionnaire (Past 2 wk assessment ):   [x] No, Questionnaire did not trigger screening.   [] Yes, Patient intake triggered C-SSRS Screening      [] C-SSRS Screening completed  [] PCP notified via Epic     SUBJECTIVE: Patient stated complaint: Pt reports he had surgery on May 25th, 2021- had a rotatorcuff repair last year and then this year had another surgery because it was hurting- pt unclear on what . 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 Reports he hasn't been wearing a sling. Pt is RHD. Pt is unemployed currently as [employer] fired him after he hurt his shoulder. Pt does not know his restrictions or whether he needs to be in the sling- has not been wearing it because it is uncomfortable. Relevant Medical History:  L RTC repair 2020 and 2021, hx R RTC  Functional Scale/Score: UEFS=18/80    Pain Scale: 6-10/10  Easing factors: ice machine, pain medication when worst  Provocative factors:  Sleeping, self care, overhead movement, driving, lifting     Type: [x]Constant   []Intermittent  []Radiating []Localized []other:     Numbness/Tingling: denies    Occupation/School: unemployed- was working at [employer] but was fired after he hurt his shoulder    Living Status/Prior Level of Function: Independent with ADLs, isn't able to do physical activity     OBJECTIVE:   Palpation: very poor tolerance to palpation- max TTP at anterior shoulder and bicep- bicep appears to possibly be torn as he has a rosaline sign. Functional Mobility/Transfers: no major deficits    Posture: fwd posture, internally rotated shoulders    Bandages/Dressings/Incisions:     Gait: (include devices/WB status): WNL     CERV ROM     Cervical Flexion     Cervical Extension     Cervical SB     Cervical rotation          PROM Left Right   Shoulder Flex ~50 deg    Shoulder Abd     Shoulder ER ~20 deg @ 30 deg ABD    Shoulder IR               Strength  Left Right   Shoulder Flex     Shoulder Scap     Shoulder ER     Shoulder IR               Reflexes/Sensation:    [x]Dermatomes/Myotomes intact    []Reflexes equal and normal bilaterally   []Other:    Joint mobility:    []Normal    [x]Hypo   []Hyper    Orthopedic Special Tests: none                       [x] Patient history, allergies, meds reviewed. Medical chart reviewed. See intake form.      Review Of Systems (ROS):  [x]Performed Review of systems (Integumentary, CardioPulmonary, Neurological) by intake and observation. Intake form has been scanned into medical record. Patient has been instructed to contact their primary care physician regarding ROS issues if not already being addressed at this time. Co-morbidities/Complexities (which will affect course of rehabilitation): *  []None           Arthritic conditions   []Rheumatoid arthritis (M05.9)  []Osteoarthritis (M19.91)   Cardiovascular conditions   [x]Hypertension (I10)  []Hyperlipidemia (E78.5)  []Angina pectoris (I20)  []Atherosclerosis (I70)  []CVA Musculoskeletal conditions   []Disc pathology   []Congenital spine pathologies   []Prior surgical intervention  []Osteoporosis (M81.8)  []Osteopenia (M85.8)   Endocrine conditions   []Hypothyroid (E03.9)  []Hyperthyroid Gastrointestinal conditions   []Constipation (Q14.31)   Metabolic conditions   []Morbid obesity (E66.01)  []Diabetes type 1(E10.65) or 2 (E11.65)   []Neuropathy (G60.9)     Pulmonary conditions   []Asthma (J45)  []Coughing   []COPD (J44.9)   Psychological Disorders  [x]Anxiety (F41.9)  []Depression (F32.9)   []Other:   []Other:          Barriers to/and or personal factors that will affect rehab potential:              []Age  []Sex   []Smoker              []Motivation/Lack of Motivation                        [x]Co-Morbidities              []Cognitive Function, education/learning barriers              []Environmental, home barriers              []profession/work barriers  []past PT/medical experience  []other:  Justification:     Falls Risk Assessment (30 days):   [x] Falls Risk assessed and no intervention required.   [] Falls Risk assessed and Patient requires intervention due to being higher risk   TUG score (>12s at risk):     [] Falls education provided, including         ASSESSMENT:    Functional Impairments:     [x]Noted spinal or UE joint hypomobility   []Noted spinal or UE joint hypermobility   [x]Decreased spinal/UE functional ROM   []Abnormal reflexes/sensation/myotomal/dermatomal deficits   [x]Decreased RC/scapular/core strength and neuromuscular control    [x]Decreased UE functional strength   []other:      Functional Activity Limitations (from functional questionnaire and intake)   [x]Reduced ability to tolerate prolonged functional positions   [x]Reduced ability or difficulty with changes of positions or transfers between positions   [x]Reduced ability to maintain good posture and demonstrate good body mechanics with sitting, bending, and lifting   [x] Reduced ability or tolerance with driving and/or computer work   [x]Reduced ability to perform lifting, reaching, carrying tasks   [x]Reduced ability to reach behind back   [x]Reduced ability to sleep    [x]Reduced ability to tolerate any impact through UE or spine   [x]Reduced ability to  or hold objects   [x]Reduced ability to throw or toss an object   []other:    Participation Restrictions   [x]Reduced participation in self care activities   [x]Reduced participation in home management activities   [x]Reduced participation in work activities   [x]Reduced participation in social activities. [x]Reduced participation in sport/recreational activities. Classification/Subgrouping:   [x]signs/symptoms consistent with post-surgical status including decreased ROM, strength and function.     []signs/symptoms consistent with joint sprain/strain    []signs/symptoms consistent with shoulder impingement (internal, external, primary or secondary)   []signs/symptoms consistent with shoulder/elbow/wrist tendinopathy   []Signs/symptoms consistent with Rotator cuff tear   []sign/symptoms consistent with labral tear   []signs/symptoms consistent with rib dysfunction   []signs/symptoms consistent with postural dysfunction   []signs/symptoms consistent with Glenohumeral IR Deficit - <45 degrees   []signs/symptoms consistent with facet dysfunction of cervical/thoracic spine   []signs/symptoms consistent with pathology which may benefit from Dry Needling   []signs/symptoms which may limit the use of advanced manual therapy techniques: (Elevated CV risk profile, recent trauma, intolerance to end range positions, prior TIA, visual issues, UE neurological compromise )     Prognosis/Rehab Potential:      []Excellent   []Good    [x]Fair   []Poor    Tolerance of evaluation/treatment:    []Excellent   []Good    []Fair   [x]Poor    Physical Therapy Evaluation Complexity Justification  [x] A history of present problem with:  [] no personal factors and/or comorbidities that impact the plan of care;  [x]1-2 personal factors and/or comorbidities that impact the plan of care  []3 personal factors and/or comorbidities that impact the plan of care  [x] An examination of body systems using standardized tests and measures addressing any of the following: body structures and functions (impairments), activity limitations, and/or participation restrictions;:  [x] a total of 1-2 or more elements   [] a total of 3 or more elements   [] a total of 4 or more elements   [x] A clinical presentation with:  [] stable and/or uncomplicated characteristics   [x] evolving clinical presentation with changing characteristics  [] unstable and unpredictable characteristics;   [x] Clinical decision making of [x] low, [] moderate, [] high complexity using standardized patient assessment instrument and/or measurable assessment of functional outcome. [x] EVAL (LOW) 11743 (typically 20 minutes face-to-face)  [] EVAL (MOD) 69726 (typically 30 minutes face-to-face)  [] EVAL (HIGH) 48447 (typically 45 minutes face-to-face)  [] RE-EVAL     PLAN:   Frequency/Duration:  1-2 days per week for 8 Weeks:  Interventions:  [x]  Therapeutic exercise including: strength training, ROM, for scapula, core and Upper extremity, including postural re-education.    [x]  NMR activation and proprioception for UE, periscapular and RC muscles and Core, including postural re-education. [x]  Manual therapy as indicated for shoulder, scapula, spine and associated soft tissue including: Dry Needling/IASTM, STM, PROM, Gr I-IV mobilizations, manipulation. [x] Modalities as needed that may include: thermal agents, E-stim, Biofeedback, US, iontophoresis as indicated  [x] Patient education on joint protection, postural re-education, activity modification, progression of HEP. [x] Aquatic exercise including: strength training, ROM, for scapula, core and Upper extremity, including postural re-education. HEP instruction: None provided today d/t unclear postop procedure- very limited eval performed d/t inability to tolerate PROM    GOALS:  Patient stated goal: \"Be able to use my arm\"  [] Progressing: [] Met: [] Not Met: [] Adjusted    Therapist goals for Patient:   Short Term Goals: To be achieved in: 2 weeks  1. Independent in HEP and progression per patient tolerance, in order to prevent re-injury. [] Progressing: [] Met: [] Not Met: [] Adjusted  2. Patient will have a decrease in pain to facilitate improvement in movement, function, and ADLs as indicated by Functional Deficits. [] Progressing: [] Met: [] Not Met: [] Adjusted    Long Term Goals: To be achieved in: 8 weeks  1. Disability index score of 42% or less for the UEFSto assist with reaching prior level of function. [] Progressing: [] Met: [] Not Met: [] Adjusted  2. Patient will demonstrate increased AROM to 150 deg flexion and abduction, T2 ER, L1 IR to allow for proper joint functioning as indicated by Functional Deficits. [] Progressing: [] Met: [] Not Met: [] Adjusted  3. Patient will demonstrate an increase in strength to 4/5 grossly at L shoulder for proper functional mobility as indicated by patients Functional Deficits. [] Progressing: [] Met: [] Not Met: [] Adjusted  4. Patient will return to dressing and self care without increased symptoms or restriction.    [] Progressing: [] Met: [] Not Met: [] Adjusted  5. Patient will return to house/yard work without increased symptoms or restriction .(patient specific functional goal)     [] Progressing: [] Met: [] Not Met: [] Adjusted    Electronically signed by:  Kandi Zhang, PT,DPT [id number]      Note: If patient does not return for scheduled/recommended follow up visits, this note will serve as a discharge from care along with the most recent update on progress.

## 2021-06-21 NOTE — FLOWSHEET NOTE
[facility], [facility]  [address] [facility] [place], [facility]  Phone: [phone]   Fax:     [phone]        Physical Therapy Treatment Note/ Progress Report:       Date:  2021    Patient Name:  Jennifer Gan    :  1970  MRN: [id number]    Pertinent Medical History:     Medical/Treatment Diagnosis Information:  Diagnosis: Z98.890 (ICD-10-CM) - Other specified postprocedural states  Treatment Diagnosis: L shoulder pain (A18.913)    Insurance/Certification information:  PT Insurance Information: Angelica Laureano  Physician Information:  Referring Practitioner: Dr. Juan Orellana of care signed (Y/N):     Date of Patient follow up with Physician:      Progress Report: []  Yes  [x]  No     Date Range for reporting period:  Beginnin2021  Ending:      Progress report due (10 Rx/or 30 days whichever is less):     Recertification due (POC duration/ or 90 days whichever is less): 21     Visit # POC/Insurance Allowable Auth Needed   1 ?  []Yes    []No     Functional Outcomes Measure:    Date Assessed: 21  Test: UEFS  Score:18/80    Pain level:  6-10/10     History of Injury:     SUBJECTIVE:  See eval    OBJECTIVE: Pt arrived 5 min late without paper work completed   Observation:    Test measurements:      RESTRICTIONS/PRECAUTIONS:     Exercises/Interventions:   Therapeutic Ex (22942)  Min: Resistance/Reps Notes/Cues                       Therapeutic Activity (98340) Min:      Pt ed 10' Ice, sling wear, waiting to start HEP until report of surgery is sent over to avoid reinjury, educated pt on concern of bicep state d/t rosaline sign              NMR re-education (99644)  Min:                    Manual Intervention (.[id number]) Min:                     Modalities:  Min                 Other Therapeutic Activities:  Pt was educated on PT POC, Diagnosis, Prognosis, pathomechanics as well as frequency and duration of scheduling future physical therapy appointments. Time was also taken on this day to answer all patient questions and participation in PT. Reviewed appointment policy in detail with patient and patient verbalized understanding. Home Exercise Program: Patient instructed in the following for HEP:          .   Patient verbalized/demonstrated understanding and was issued written handout. Therapeutic Exercise and NMR EXR  [] (66542) Provided verbal/tactile cueing for activities related to strengthening, flexibility, endurance, ROM  for improvements in scapular, scapulothoracic and UE control with self care, reaching, carrying, lifting, house/yardwork, driving/computer work.    [] (60717) Provided verbal/tactile cueing for activities related to improving balance, coordination, kinesthetic sense, posture, motor skill, proprioception  to assist with  scapular, scapulothoracic and UE control with self care, reaching, carrying, lifting, house/yardwork, driving/computer work. Therapeutic Activities:    [x] (86390 or 26407) Provided verbal/tactile cueing for activities related to improving balance, coordination, kinesthetic sense, posture, motor skill, proprioception and motor activation to allow for proper function of scapular, scapulothoracic and UE control with self care, carrying, lifting, driving/computer work.      Home Exercise Program:    [x] (46438) Reviewed/Progressed HEP activities related to strengthening, flexibility, endurance, ROM of scapular, scapulothoracic and UE control with self care, reaching, carrying, lifting, house/yardwork, driving/computer work  [] (61922) Reviewed/Progressed HEP activities related to improving balance, coordination, kinesthetic sense, posture, motor skill, proprioception of scapular, scapulothoracic and UE control with self care, reaching, carrying, lifting, house/yardwork, driving/computer work      Manual Treatments:  PROM / STM / Oscillations-Mobs:  G-I, II, III, IV (Candice, Inf., Post.)  [] (57978) Provided manual therapy to mobilize soft tissue/joints of cervical/CT, scapular GHJ and UE for the purpose of modulating pain, promoting relaxation,  increasing ROM, reducing/eliminating soft tissue swelling/inflammation/restriction, improving soft tissue extensibility and allowing for proper ROM for normal function with self care, reaching, carrying, lifting, house/yardwork, driving/computer work    Charges:  Timed Code Treatment Minutes: 10   Total Treatment Minutes: 35       [] EVAL (LOW) 87231 (typically 20 minutes face-to-face)  [] EVAL (MOD) 71261 (typically 30 minutes face-to-face)  [] EVAL (HIGH) 43212 (typically 45 minutes face-to-face)  [] RE-EVAL     [] PG(07875) x     [] Dry needle 1 or 2 Muscles (14969)  [] NMR (05760) x     [] Dry needle 3+ Muscles (09149)  [] Manual (53682) x     [] Ultrasound (02724) x  [x] TA (55799) x 1    [] Mech Traction (58032)  [] ES(attended) (90480)     [] ES (un) (23068):   [] Vasopump (34369) [] Ionto (59160)   [] Other:    If [facility] Please Indicate Time In/Out  CPT Code Time in Time out                                   Approval Dates:  CPT Code Units Approved Units Used  Date Updated:                     GOALS:  Patient stated goal: \"Be able to use my arm\"  []? Progressing: []? Met: []? Not Met: []? Adjusted     Therapist goals for Patient:   Short Term Goals: To be achieved in: 2 weeks  1. Independent in HEP and progression per patient tolerance, in order to prevent re-injury. []? Progressing: []? Met: []? Not Met: []? Adjusted  2. Patient will have a decrease in pain to facilitate improvement in movement, function, and ADLs as indicated by Functional Deficits. []? Progressing: []? Met: []? Not Met: []? Adjusted     Long Term Goals: To be achieved in: 8 weeks  1. Disability index score of 42% or less for the UEFSto assist with reaching prior level of function. []? Progressing: []? Met: []? Not Met: []? Adjusted  2.  Patient will demonstrate increased AROM to 150 deg flexion and abduction, T2 ER, L1 IR to allow for proper joint functioning as indicated by Functional Deficits. []? Progressing: []? Met: []? Not Met: []? Adjusted  3. Patient will demonstrate an increase in strength to 4/5 grossly at L shoulder for proper functional mobility as indicated by patients Functional Deficits. []? Progressing: []? Met: []? Not Met: []? Adjusted  4. Patient will return to dressing and self care without increased symptoms or restriction. []? Progressing: []? Met: []? Not Met: []? Adjusted  5. Patient will return to house/yard work without increased symptoms or restriction .(patient specific functional goal)           []? Progressing: []? Met: []? Not Met: []? Adjusted    ASSESSMENT:  See eval    Treatment/Activity Tolerance:  [x] Patient tolerated treatment well [] Patient limited by fatique  [] Patient limited by pain  [] Patient limited by other medical complications  [] Other:     Overall Progression Towards Functional goals/ Treatment Progress Update:  [] Patient is progressing as expected towards functional goals listed. [] Progression is slowed due to complexities/Impairments listed. [] Progression has been slowed due to co-morbidities.   [x] Plan just implemented, too soon to assess goals progression <30days   [] Goals require adjustment due to lack of progress  [] Patient is not progressing as expected and requires additional follow up with physician  [] Other    Prognosis for POC: [x] Good [] Fair  [] Poor    Patient requires continued skilled intervention: [x] Yes  [] No      PLAN: See eval  [] Continue per plan of care [] Alter current plan (see comments)  [x] Plan of care initiated [] Hold pending MD visit [] Discharge     Electronically signed by: Kalen Alejandra, PT ,DPT [id number]    Note: If patient does not return for scheduled/recommended follow up visits, this note will serve as a discharge from care along with the most recent update on progress.

## 2021-06-22 ENCOUNTER — OFFICE VISIT (OUTPATIENT)
Dept: FAMILY MEDICINE CLINIC | Age: 51
End: 2021-06-22

## 2021-06-22 VITALS
DIASTOLIC BLOOD PRESSURE: 98 MMHG | WEIGHT: 220.13 LBS | BODY MASS INDEX: 31.51 KG/M2 | HEIGHT: 70 IN | HEART RATE: 79 BPM | OXYGEN SATURATION: 98 % | RESPIRATION RATE: 14 BRPM | SYSTOLIC BLOOD PRESSURE: 150 MMHG

## 2021-06-22 DIAGNOSIS — I10 ESSENTIAL HYPERTENSION: Primary | ICD-10-CM

## 2021-06-28 ENCOUNTER — HOSPITAL ENCOUNTER (OUTPATIENT)
Dept: PHYSICAL THERAPY | Age: 51
Setting detail: THERAPIES SERIES
Discharge: HOME | End: 2021-06-28
Payer: COMMERCIAL

## 2021-06-28 PROCEDURE — 97140 MANUAL THERAPY 1/> REGIONS: CPT

## 2021-06-28 PROCEDURE — 97112 NEUROMUSCULAR REEDUCATION: CPT

## 2021-06-28 PROCEDURE — 97110 THERAPEUTIC EXERCISES: CPT

## 2021-06-28 NOTE — FLOWSHEET NOTE
[facility], [facility]  [address] [facility] [place], [facility]  Phone: [phone]   Fax:     [phone]        Physical Therapy Treatment Note/ Progress Report:       Date:  2021    Patient Name:  Leidy Hardwick    :  1970  MRN: [id number]    Pertinent Medical History:     Medical/Treatment Diagnosis Information:  Diagnosis: Z98.890 (ICD-10-CM) - Other specified postprocedural states  Treatment Diagnosis: L shoulder pain (B61.610)    Insurance/Certification information:  PT Insurance Information: Jane Du  Physician Information:  Referring Practitioner: Dr. Nilesh Goldsmith of care signed (Y/N):     Date of Patient follow up with Physician:      Progress Report: []  Yes  [x]  No     Date Range for reporting period:  Beginnin2021  Ending:      Progress report due (10 Rx/or 30 days whichever is less): 45    Recertification due (POC duration/ or 90 days whichever is less): 21     Visit # POC/Insurance Allowable Auth Needed   2 WC- no confirmation received at this time for coverage- per pt  says it will be []Yes    []No     Functional Outcomes Measure:    Date Assessed: 21  Test: UEFS  Score:18/80    Pain level:  6-10/10     History of Injury: RTC repair May 25,2021, second surgery following a workman's compensation claim. SUBJECTIVE: Pt states he tried to do wall walks at home but it hurt. Pt was educated he was told not to do any exercise prior to returning to PT and that he is not to be doing any active raising of the arm. Pt states he just has been laying in bed so he isn't wearing his sling.     OBJECTIVE:    Observation: guarded   Test measurements:      RESTRICTIONS/PRECAUTIONS: Supraspinatus RTC repair 21- OK to wean from sling at this time    Exercises/Interventions:   Therapeutic Ex (60890)  Min: Resistance/Reps Notes/Cues   Table slides flexion 10\" x 10    Seated cane my arm\"  []? Progressing: []? Met: []? Not Met: []? Adjusted     Therapist goals for Patient:   Short Term Goals: To be achieved in: 2 weeks  1. Independent in HEP and progression per patient tolerance, in order to prevent re-injury. []? Progressing: []? Met: []? Not Met: []? Adjusted  2. Patient will have a decrease in pain to facilitate improvement in movement, function, and ADLs as indicated by Functional Deficits. []? Progressing: []? Met: []? Not Met: []? Adjusted     Long Term Goals: To be achieved in: 8 weeks  1. Disability index score of 42% or less for the UEFSto assist with reaching prior level of function. []? Progressing: []? Met: []? Not Met: []? Adjusted  2. Patient will demonstrate increased AROM to 150 deg flexion and abduction, T2 ER, L1 IR to allow for proper joint functioning as indicated by Functional Deficits. []? Progressing: []? Met: []? Not Met: []? Adjusted  3. Patient will demonstrate an increase in strength to 4/5 grossly at L shoulder for proper functional mobility as indicated by patients Functional Deficits. []? Progressing: []? Met: []? Not Met: []? Adjusted  4. Patient will return to dressing and self care without increased symptoms or restriction. []? Progressing: []? Met: []? Not Met: []? Adjusted  5. Patient will return to house/yard work without increased symptoms or restriction .(patient specific functional goal)           []? Progressing: []? Met: []? Not Met: []? Adjusted    ASSESSMENT:  Pt educated that we have not received any confirmation of coverage for therapy visits from workman's compensation at this point. I suggested that if he does have documentation from his  or  about PT coverage he should bring it. Pt also educated that if it does get denied he may not be covered through his own personal health insurance, we are unable to tell at this point what will be covered.   Educated pt that if these denials do happen he may be responsible for the cost of

## 2021-07-03 DIAGNOSIS — I10 ESSENTIAL HYPERTENSION: ICD-10-CM

## 2021-07-06 NOTE — TELEPHONE ENCOUNTER
Patient got irritated with questions, threw his mask on floor and walked out at appointment on 6/22.  He was supposed to be sent a dismissal letter

## 2021-07-09 RX ORDER — LISINOPRIL 10 MG/1
TABLET ORAL
Qty: 30 TABLET | Refills: 0 | Status: SHIPPED | OUTPATIENT
Start: 2021-07-09 | End: 2021-12-03

## 2021-07-12 ENCOUNTER — HOSPITAL ENCOUNTER (OUTPATIENT)
Dept: PHYSICAL THERAPY | Age: 51
Setting detail: THERAPIES SERIES
Discharge: HOME | End: 2021-07-12
Payer: COMMERCIAL

## 2021-07-12 NOTE — FLOWSHEET NOTE
[facility], [facility]  [address] [facility] [place], [facility]  Phone: [phone]   Fax:     [phone]    Physical Therapy  Cancellation/No-show Note  Patient Name:  Kenyatta Canas  :  1970   Date:  2021  Cancelled visits to date: 1  No-shows to date: 1    Patient status for today's appointment patient:  [x]  Cancelled  []  Rescheduled appointment  []  No-show     Reason given by patient:  []  Patient ill  []  Conflicting appointment  []  No transportation    []  Conflict with work  [x]  No reason given  []  Other:     Comments:      Phone call information:   []  Phone call made today to patient at 8:30 at number provided:      []  Patient answered, conversation as follows:    []  Patient did not answer  [x]  Phone call not made today    Electronically signed by:  Wendi Neri,

## 2021-07-14 ENCOUNTER — HOSPITAL ENCOUNTER (OUTPATIENT)
Dept: PHYSICAL THERAPY | Age: 51
Setting detail: THERAPIES SERIES
Discharge: HOME | End: 2021-07-14
Payer: COMMERCIAL

## 2021-07-14 PROCEDURE — 97140 MANUAL THERAPY 1/> REGIONS: CPT

## 2021-07-14 PROCEDURE — 97110 THERAPEUTIC EXERCISES: CPT

## 2021-07-14 NOTE — FLOWSHEET NOTE
[facility], [facility]  [address] [facility] [place], [facility] [place]  Phone: [phone]   Fax:     [phone]        Physical Therapy Treatment Note/ Progress Report:       Date:  2021    Patient Name:  Josey Almendarez    :  1970  MRN: [id number]    Pertinent Medical History:     Medical/Treatment Diagnosis Information:  Diagnosis: Z98.890 (ICD-10-CM) - Other specified postprocedural states  Treatment Diagnosis: L shoulder pain (A40.162)    Insurance/Certification information:  PT Insurance Information: Ailyn Singleton  Physician Information:  Referring Practitioner: Dr. Driss Thao of care signed (Y/N):     Date of Patient follow up with Physician:      Progress Report: []  Yes  [x]  No     Date Range for reporting period:  Beginnin2021  Ending:      Progress report due (10 Rx/or 30 days whichever is less): 3/51/48    Recertification due (POC duration/ or 90 days whichever is less): 21     Visit # POC/Insurance Allowable Auth Needed   3 WC- no confirmation received at this time for coverage- per pt  says it will be []Yes    []No     Functional Outcomes Measure:    Date Assessed: 21  Test: UEFS  Score:18/80    Pain level:  4/10     History of Injury: RTC repair May 25,2021, second surgery following a workman's compensation claim. SUBJECTIVE: Pt states his arm doesn't hurt unless he moves it. OBJECTIVE:    Observation: excessive guarding and hypersensitivity to manual intervention.     Test measurements:      RESTRICTIONS/PRECAUTIONS: Supraspinatus RTC repair 21- OK to wean from sling at this time    Exercises/Interventions:   Therapeutic Ex (47492)  Min: Resistance/Reps Notes/Cues   Pulleys 3' 10\" hold at top   Table slides flexion 10\" x 10       Supine cane ER @ 30 deg abd 10\" x 10    Elbow flex/ext standing 20x    UT stretch bilat 30\" x 3    Standing scap retraction 5\" x 20         No Gastroenterology Gastroenterology Gastroenterology Gastroenterology Gastroenterology Gastroenterology Gastroenterology Heme/Onc Heme/Onc Heme/Onc Infectious Disease Infectious Disease Infectious Disease Infectious Disease Infectious Disease Internal Medicine Internal Medicine Internal Medicine Internal Medicine Internal Medicine Internal Medicine Internal Medicine Internal Medicine Internal Medicine Internal Medicine Internal Medicine Internal Medicine Surgery Surgery Heme/Onc money 2 x 10                   Therapeutic Activity (65682) Min:                     NMR re-education (87598)  Min:                    Manual Intervention (63523) Min:      Oscillations for relaxation  G1-2 post and inf GHJ mobs  PROM ER, ABD and IR 10' Unable to tolerate flexion- pt with very poor pain tolerance             Modalities:  Min                 Other Therapeutic Activities:  Pt was educated on PT POC, Diagnosis, Prognosis, pathomechanics as well as frequency and duration of scheduling future physical therapy appointments. Time was also taken on this day to answer all patient questions and participation in PT. Reviewed appointment policy in detail with patient and patient verbalized understanding. Home Exercise Program: Patient instructed in the following for HEP:            Access Code: [email or web address]  URL: [email or web address]  Date: 06/28/2021  Prepared by: Radha Baumann     Patient verbalized/demonstrated understanding and was issued written handout. Therapeutic Exercise and NMR EXR  [x] (22476) Provided verbal/tactile cueing for activities related to strengthening, flexibility, endurance, ROM  for improvements in scapular, scapulothoracic and UE control with self care, reaching, carrying, lifting, house/yardwork, driving/computer work.    [] (39465) Provided verbal/tactile cueing for activities related to improving balance, coordination, kinesthetic sense, posture, motor skill, proprioception  to assist with  scapular, scapulothoracic and UE control with self care, reaching, carrying, lifting, house/yardwork, driving/computer work. Therapeutic Activities:    [x] (13164 or 12440) Provided verbal/tactile cueing for activities related to improving balance, coordination, kinesthetic sense, posture, motor skill, proprioception and motor activation to allow for proper function of scapular, scapulothoracic and UE control with self care, carrying, lifting, driving/computer work.      Home Exercise Program:    [x] (85231) Reviewed/Progressed HEP activities related to strengthening, flexibility, endurance, ROM of scapular, scapulothoracic and UE control with self care, reaching, carrying, lifting, house/yardwork, driving/computer work  [] (33287) Reviewed/Progressed HEP activities related to improving balance, coordination, kinesthetic sense, posture, motor skill, proprioception of scapular, scapulothoracic and UE control with self care, reaching, carrying, lifting, house/yardwork, driving/computer work      Manual Treatments:  PROM / STM / Oscillations-Mobs:  G-I, II, III, IV (PA's, Inf., Post.)  [] (91525) Provided manual therapy to mobilize soft tissue/joints of cervical/CT, scapular GHJ and UE for the purpose of modulating pain, promoting relaxation,  increasing ROM, reducing/eliminating soft tissue swelling/inflammation/restriction, improving soft tissue extensibility and allowing for proper ROM for normal function with self care, reaching, carrying, lifting, house/yardwork, driving/computer work    Charges:  Timed Code Treatment Minutes: 38   Total Treatment Minutes: 38       [] EVAL (LOW) 46059 (typically 20 minutes face-to-face)  [] EVAL (MOD) 26671 (typically 30 minutes face-to-face)  [] EVAL (HIGH) 00295 (typically 45 minutes face-to-face)  [] RE-EVAL     [] JW(94225) x     [] Dry needle 1 or 2 Muscles (13993)  [] NMR (91490) x     [] Dry needle 3+ Muscles (82017)  [] Manual (55671) x     [] Ultrasound (37917) x  [x] TA (52877) x 1    [] Mech Traction (06222)  [] ES(attended) (19911)     [] ES (un) (00997):   [] Vasopump (76284) [] Ionto (03675)   [] Other:    If [facility] Please Indicate Time In/Out  CPT Code Time in Time out   Manual-62348 1:40 1:50   Therex-61588 1:50 2:15                         Approval Dates:  CPT Code Units Approved Units Used  Date Updated:                     GOALS:  Patient stated goal: \"Be able to use my arm\"  []? Progressing: []? Met: []?  Not Met: []? Internal Medicine functional goals listed. [] Progression is slowed due to complexities/Impairments listed. [] Progression has been slowed due to co-morbidities. [x] Plan just implemented, too soon to assess goals progression <30days   [] Goals require adjustment due to lack of progress  [] Patient is not progressing as expected and requires additional follow up with physician  [] Other    Prognosis for POC: [x] Good [] Fair  [] Poor    Patient requires continued skilled intervention: [x] Yes  [] No      PLAN:   [x] Continue per plan of care [] Alter current plan (see comments)  [] Plan of care initiated [] Hold pending MD visit [] Discharge     Electronically signed by: Blade Staton, PT ,DPT [id number]    Note: If patient does not return for scheduled/recommended follow up visits, this note will serve as a discharge from care along with the most recent update on progress.

## 2021-07-21 ENCOUNTER — HOSPITAL ENCOUNTER (OUTPATIENT)
Dept: PHYSICAL THERAPY | Age: 51
Setting detail: THERAPIES SERIES
Discharge: HOME | End: 2021-07-21
Payer: COMMERCIAL

## 2021-07-21 PROCEDURE — 97140 MANUAL THERAPY 1/> REGIONS: CPT

## 2021-07-21 PROCEDURE — 97110 THERAPEUTIC EXERCISES: CPT

## 2021-07-21 NOTE — FLOWSHEET NOTE
[facility], [facility]  [address] [facility] [place], [facility]  Phone: [phone]   Fax:     [phone]        Physical Therapy Treatment Note/ Progress Report:       Date:  2021    Patient Name:  Kenyatta Canas    :  1970  MRN: [id number]    Pertinent Medical History:     Medical/Treatment Diagnosis Information:  Diagnosis: Z98.890 (ICD-10-CM) - Other specified postprocedural states  Treatment Diagnosis: L shoulder pain (T01.593)    Insurance/Certification information:  PT Insurance Information: Bart Camarillo  Physician Information:  Referring Practitioner: Dr. Kevin Lima of care signed (Y/N):     Date of Patient follow up with Physician:      Progress Report: []  Yes  [x]  No     Date Range for reporting period:  Beginnin2021  Ending:      Progress report due (10 Rx/or 30 days whichever is less):     Recertification due (POC duration/ or 90 days whichever is less): 21     Visit # POC/Insurance Allowable Auth Needed   4 WC- no confirmation received at this time for coverage- per pt  says it will be []Yes    []No     Functional Outcomes Measure:    Date Assessed: 21  Test: UEFS  Score:18/80    Pain level:  4/10     History of Injury: RTC repair May 25,2021, second surgery following a workman's compensation claim. SUBJECTIVE: Pt states he is doing his exercises.     OBJECTIVE:    Observation: decreased guarding today w/ manual intervention   Test measurements:      RESTRICTIONS/PRECAUTIONS: Supraspinatus RTC repair 21- OK to wean from sling at this time    Exercises/Interventions:   Therapeutic Ex (77968)  Min: Resistance/Reps Notes/Cues   Pulleys 3' flex  2' scapt    Supine SA punch 2 x 10          SL ER 2 x 10 Cues for scap retraction        AAROM wall slide 5\" x 5 painful   [place] on floor 3D reach and CW/CCW circles 10x ea       RTB row 2 x 10    RTB ext 2 x 10 x 3\" work.     Home Exercise Program:    [x] (74931) Reviewed/Progressed HEP activities related to strengthening, flexibility, endurance, ROM of scapular, scapulothoracic and UE control with self care, reaching, carrying, lifting, house/yardwork, driving/computer work  [] (75109) Reviewed/Progressed HEP activities related to improving balance, coordination, kinesthetic sense, posture, motor skill, proprioception of scapular, scapulothoracic and UE control with self care, reaching, carrying, lifting, house/yardwork, driving/computer work      Manual Treatments:  PROM / STM / Oscillations-Mobs:  G-I, II, III, IV (PA's, Inf., Post.)  [x] (74072) Provided manual therapy to mobilize soft tissue/joints of cervical/CT, scapular GHJ and UE for the purpose of modulating pain, promoting relaxation,  increasing ROM, reducing/eliminating soft tissue swelling/inflammation/restriction, improving soft tissue extensibility and allowing for proper ROM for normal function with self care, reaching, carrying, lifting, house/yardwork, driving/computer work    Charges:  Timed Code Treatment Minutes: 43   Total Treatment Minutes: 43       [] EVAL (LOW) 74150 (typically 20 minutes face-to-face)  [] EVAL (MOD) 66656 (typically 30 minutes face-to-face)  [] EVAL (HIGH) 98260 (typically 45 minutes face-to-face)  [] RE-EVAL     [x] PJ(47876) x2     [] Dry needle 1 or 2 Muscles (92320)  [] NMR (44535) x     [] Dry needle 3+ Muscles (28263)  [x] Manual (56433) x 1    [] Ultrasound (47227) x  [] TA (49425) x   [] Mech Traction (59191)  [] ES(attended) (23148)     [] ES (un) (97993):   [] Vasopump (23462) [] Ionto (70727)   [] Other:    If [facility] Please Indicate Time In/Out  CPT Code Time in Time out   [id number] 2:00 2:10   Therex-17190 2:11 2:43                         Approval Dates:  CPT Code Units Approved Units Used  Date Updated:                     GOALS:  Patient stated goal: \"Be able to use my arm\"  []? Progressing: []? Met: []?  Not Met: []? Adjusted     Therapist goals for Patient:   Short Term Goals: To be achieved in: 2 weeks  1. Independent in HEP and progression per patient tolerance, in order to prevent re-injury. []? Progressing: []? Met: []? Not Met: []? Adjusted  2. Patient will have a decrease in pain to facilitate improvement in movement, function, and ADLs as indicated by Functional Deficits. []? Progressing: []? Met: []? Not Met: []? Adjusted     Long Term Goals: To be achieved in: 8 weeks  1. Disability index score of 42% or less for the UEFSto assist with reaching prior level of function. []? Progressing: []? Met: []? Not Met: []? Adjusted  2. Patient will demonstrate increased AROM to 150 deg flexion and abduction, T2 ER, L1 IR to allow for proper joint functioning as indicated by Functional Deficits. []? Progressing: []? Met: []? Not Met: []? Adjusted  3. Patient will demonstrate an increase in strength to 4/5 grossly at L shoulder for proper functional mobility as indicated by patients Functional Deficits. []? Progressing: []? Met: []? Not Met: []? Adjusted  4. Patient will return to dressing and self care without increased symptoms or restriction. []? Progressing: []? Met: []? Not Met: []? Adjusted  5. Patient will return to house/yard work without increased symptoms or restriction .(patient specific functional goal)           []? Progressing: []? Met: []? Not Met: []? Adjusted    ASSESSMENT:  Pt with improved tolerance for manual intervention today. With palpation can appreciate decreased muscular tone around the shoulder girdle. Updated HEP to work on more AROM and scap strength today. Continues to benefit from skilled PT to address muscular weakness and decreased ROM.     Treatment/Activity Tolerance:  [x] Patient tolerated treatment well [] Patient limited by fatique  [] Patient limited by pain  [] Patient limited by other medical complications  [] Other:     Overall Progression Towards Functional goals/ Treatment Progress Update:  [] Patient is progressing as expected towards functional goals listed. [] Progression is slowed due to complexities/Impairments listed. [] Progression has been slowed due to co-morbidities. [x] Plan just implemented, too soon to assess goals progression <30days   [] Goals require adjustment due to lack of progress  [] Patient is not progressing as expected and requires additional follow up with physician  [] Other    Prognosis for POC: [x] Good [] Fair  [] Poor    Patient requires continued skilled intervention: [x] Yes  [] No      PLAN:   [x] Continue per plan of care [] Alter current plan (see comments)  [] Plan of care initiated [] Hold pending MD visit [] Discharge     Electronically signed by: Yasmeen Mathew PT ,DPT [id number]    Note: If patient does not return for scheduled/recommended follow up visits, this note will serve as a discharge from care along with the most recent update on progress.

## 2021-07-28 ENCOUNTER — HOSPITAL ENCOUNTER (OUTPATIENT)
Dept: PHYSICAL THERAPY | Age: 51
Setting detail: THERAPIES SERIES
Discharge: HOME | End: 2021-07-28
Payer: COMMERCIAL

## 2021-07-28 PROCEDURE — 97110 THERAPEUTIC EXERCISES: CPT

## 2021-07-28 PROCEDURE — 97140 MANUAL THERAPY 1/> REGIONS: CPT

## 2021-07-28 NOTE — FLOWSHEET NOTE
[facility], [facility]  [address] [facility], [facility]  Phone: [phone]   Fax:     [phone]        Physical Therapy Treatment Note/ Progress Report:       Date:  2021    Patient Name:  Matt Lawrence    :  1970  MRN: [id number]    Pertinent Medical History:     Medical/Treatment Diagnosis Information:  Diagnosis: Z98.890 (ICD-10-CM) - Other specified postprocedural states  Treatment Diagnosis: L shoulder pain (O08.828)    Insurance/Certification information:  PT Insurance Information: Zulma Ang  Physician Information:  Referring Practitioner: Dr. Betito Ralph of care signed (Y/N):     Date of Patient follow up with Physician:      Progress Report: []  Yes  [x]  No     Date Range for reporting period:  Beginnin2021  Ending:      Progress report due (10 Rx/or 30 days whichever is less):     Recertification due (POC duration/ or 90 days whichever is less): 21     Visit # POC/Insurance Allowable Auth Needed   5 WC- no confirmation received at this time for coverage- per pt  says it will be []Yes    []No     Functional Outcomes Measure:    Date Assessed: 21  Test: UEFS  Score:18/80    Pain level: 0/10     History of Injury: RTC repair May 25,2021, second surgery following a workman's compensation claim. SUBJECTIVE: Pt states his shoulder is feeling good, getting stronger.     OBJECTIVE:    Observation:   Test measurements: PROM full for flexion, near full abduction, improving IR/ER       **NEEDS UPDATED MEASUREMENTS NV**    RESTRICTIONS/PRECAUTIONS: Supraspinatus RTC repair 21- OK to wean from sling at this time    Exercises/Interventions:   Therapeutic Ex (33951)  Min: Resistance/Reps Notes/Cues   Pulleys 3' flex  2' scapt          Supine cane press and flexion 10\" x 10    Supine cane ER @ 30 deg abd 10\" x 10    SL ER 2 x 10 Cues for scap retraction [place] at wall for flexion 10x    [place] on floor 3D reach and CW/CCW circles 10x ea       GTB row 3\" x 30 Cues for avoiding shrug   RTB ext 3\" x 30 Cues for form initially   Bicep curl 3#     Small range flex/ext from 90 deg and abd/add 10 ea 1#, supine             Therapeutic Activity (43116) Min:                     NMR re-education (03649)  Min:     Wall iso ABD 10\" x 10    Wall iso FLEX 10\" x 10 Cues for form        Manual Intervention (86479) Min:      Oscillations for relaxation  STM pec and post cuff  PROM ER, IR, scaption and flexion 12' Improved relaxation             Modalities:  Min                 Other Therapeutic Activities:  Pt was educated on PT POC, Diagnosis, Prognosis, pathomechanics as well as frequency and duration of scheduling future physical therapy appointments. Time was also taken on this day to answer all patient questions and participation in PT. Reviewed appointment policy in detail with patient and patient verbalized understanding. Home Exercise Program: Patient instructed in the following for HEP:            Access Code: [email or web address]  URL: [email or web address]  Date: 07/21/2021  Prepared by: Sabiha Cox     Patient verbalized/demonstrated understanding and was issued written handout. Therapeutic Exercise and NMR EXR  [x] (44231) Provided verbal/tactile cueing for activities related to strengthening, flexibility, endurance, ROM  for improvements in scapular, scapulothoracic and UE control with self care, reaching, carrying, lifting, house/yardwork, driving/computer work.    [] (90216) Provided verbal/tactile cueing for activities related to improving balance, coordination, kinesthetic sense, posture, motor skill, proprioception  to assist with  scapular, scapulothoracic and UE control with self care, reaching, carrying, lifting, house/yardwork, driving/computer work.     Therapeutic Activities:    [x] (74301 or 99747) Provided verbal/tactile cueing for activities related to improving balance, coordination, kinesthetic sense, posture, motor skill, proprioception and motor activation to allow for proper function of scapular, scapulothoracic and UE control with self care, carrying, lifting, driving/computer work.      Home Exercise Program:    [x] (55230) Reviewed/Progressed HEP activities related to strengthening, flexibility, endurance, ROM of scapular, scapulothoracic and UE control with self care, reaching, carrying, lifting, house/yardwork, driving/computer work  [] (44813) Reviewed/Progressed HEP activities related to improving balance, coordination, kinesthetic sense, posture, motor skill, proprioception of scapular, scapulothoracic and UE control with self care, reaching, carrying, lifting, house/yardwork, driving/computer work      Manual Treatments:  PROM / STM / Oscillations-Mobs:  G-I, II, III, IV (PA's, Inf., Post.)  [x] (34330) Provided manual therapy to mobilize soft tissue/joints of cervical/CT, scapular GHJ and UE for the purpose of modulating pain, promoting relaxation,  increasing ROM, reducing/eliminating soft tissue swelling/inflammation/restriction, improving soft tissue extensibility and allowing for proper ROM for normal function with self care, reaching, carrying, lifting, house/yardwork, driving/computer work    Charges:  Timed Code Treatment Minutes: 40   Total Treatment Minutes: 40       [] EVAL (LOW) 79248 (typically 20 minutes face-to-face)  [] EVAL (MOD) 10204 (typically 30 minutes face-to-face)  [] EVAL (HIGH) 49830 (typically 45 minutes face-to-face)  [] RE-EVAL     [x] KH(77708) x2     [] Dry needle 1 or 2 Muscles (00472)  [] NMR (20851) x     [] Dry needle 3+ Muscles (65004)  [x] Manual (81752) x 1    [] Ultrasound (30915) x  [] TA (75478) x   [] Mech Traction (02261)  [] ES(attended) (67655)     [] ES (un) (22902):   [] Vasopump (38652) [] Ionto (79832)   [] Other:    If [facility] Please Indicate Time In/Out  CPT Code Time in Time out   [id number] 2:00 2:12   Therex-55356 2:13 2:40                         Approval Dates:  CPT Code Units Approved Units Used  Date Updated:                     GOALS:  Patient stated goal: \"Be able to use my arm\"  []? Progressing: []? Met: []? Not Met: []? Adjusted     Therapist goals for Patient:   Short Term Goals: To be achieved in: 2 weeks  1. Independent in HEP and progression per patient tolerance, in order to prevent re-injury. []? Progressing: []? Met: []? Not Met: []? Adjusted  2. Patient will have a decrease in pain to facilitate improvement in movement, function, and ADLs as indicated by Functional Deficits. []? Progressing: []? Met: []? Not Met: []? Adjusted     Long Term Goals: To be achieved in: 8 weeks  1. Disability index score of 42% or less for the UEFSto assist with reaching prior level of function. []? Progressing: []? Met: []? Not Met: []? Adjusted  2. Patient will demonstrate increased AROM to 150 deg flexion and abduction, T2 ER, L1 IR to allow for proper joint functioning as indicated by Functional Deficits. []? Progressing: []? Met: []? Not Met: []? Adjusted  3. Patient will demonstrate an increase in strength to 4/5 grossly at L shoulder for proper functional mobility as indicated by patients Functional Deficits. []? Progressing: []? Met: []? Not Met: []? Adjusted  4. Patient will return to dressing and self care without increased symptoms or restriction. []? Progressing: []? Met: []? Not Met: []? Adjusted  5. Patient will return to house/yard work without increased symptoms or restriction .(patient specific functional goal)           []? Progressing: []? Met: []? Not Met: []? Adjusted    ASSESSMENT:  Pt did well with therex today, able to perform AAROM w/ [place] at the wall without any sharp pain. Continues to benefit from skilled PT to address strengthening to return to full function.     Treatment/Activity Tolerance:  [x] Patient tolerated treatment well [] Patient limited by stephen  [] Patient limited by pain  [] Patient limited by other medical complications  [] Other:     Overall Progression Towards Functional goals/ Treatment Progress Update:  [] Patient is progressing as expected towards functional goals listed. [] Progression is slowed due to complexities/Impairments listed. [] Progression has been slowed due to co-morbidities. [x] Plan just implemented, too soon to assess goals progression <30days   [] Goals require adjustment due to lack of progress  [] Patient is not progressing as expected and requires additional follow up with physician  [] Other    Prognosis for POC: [x] Good [] Fair  [] Poor    Patient requires continued skilled intervention: [x] Yes  [] No      PLAN:   [x] Continue per plan of care [] Alter current plan (see comments)  [] Plan of care initiated [] Hold pending MD visit [] Discharge     Electronically signed by: Shirley Lizarraga, PT ,DPT [id number]    Note: If patient does not return for scheduled/recommended follow up visits, this note will serve as a discharge from care along with the most recent update on progress.

## 2021-08-02 ENCOUNTER — HOSPITAL ENCOUNTER (OUTPATIENT)
Dept: PHYSICAL THERAPY | Age: 51
Setting detail: THERAPIES SERIES
Discharge: HOME | End: 2021-08-02
Payer: COMMERCIAL

## 2021-08-02 PROCEDURE — 97110 THERAPEUTIC EXERCISES: CPT

## 2021-08-02 PROCEDURE — 97140 MANUAL THERAPY 1/> REGIONS: CPT

## 2021-08-02 NOTE — FLOWSHEET NOTE
[facility], [facility]  [address] [place], [facility]  Phone: [phone]   Fax:     [phone]        Physical Therapy Treatment Note/ Progress Report:       Date:  2021    Patient Name:  Cindi Rodriguez    :  1970  MRN: [id number]    Pertinent Medical History:     Medical/Treatment Diagnosis Information:  Diagnosis: Z98.890 (ICD-10-CM) - Other specified postprocedural states  Treatment Diagnosis: L shoulder pain (V04.467)    Insurance/Certification information:  PT Insurance Information: Susan Shea  Physician Information:  Referring Practitioner: Dr. Katherin Dobson of care signed (Y/N):     Date of Patient follow up with Physician:      Progress Report: []  Yes  [x]  No     Date Range for reporting period:  Beginnin2021  Ending:      Progress report due (10 Rx/or 30 days whichever is less):     Recertification due (POC duration/ or 90 days whichever is less): 21     Visit # POC/Insurance Allowable Auth Needed   6 WC- no confirmation received at this time for coverage- per pt  says it will be []Yes    []No     Functional Outcomes Measure:    Date Assessed: 21  Test: UEFS  Score:18/80    Pain level: 3/10     History of Injury: RTC repair May 25,2021, second surgery following a workman's compensation claim. SUBJECTIVE:    21 shoulder feels stiff today. Saw MD, please with progress thus far, F/U again in 6 weeks.           OBJECTIVE:    Observation:   Test measurements: PROM full for flexion, near full abduction, improving IR/ER       **NEEDS UPDATED MEASUREMENTS NV**    RESTRICTIONS/PRECAUTIONS: Supraspinatus RTC repair 21- OK to wean from sling at this time    Exercises/Interventions:   Therapeutic Ex (51592)  Min: Resistance/Reps Notes/Cues   Pulleys 3' flex  2' scapt          Supine cane press and flexion 10\" x 10    Supine cane ER @ 30 deg abd 10\" x 10    SL ER 0# 2 x 10 Cues for scap retraction        [place] at wall for flexion 10x    [place] on floor 3D reach and CW/CCW circles 10x ea       GTB row 3\" x 30 Cues for avoiding shrug   RTB ext 3\" x 30 Cues for form initially   Bicep curl  3#  2 x 10     Small range flex/ext from 90 deg and abd/add 1#x10     x7 1#, supine    Catching noted with abd/ add  At 7 reps             Therapeutic Activity (29260) Min:                     NMR re-education (75012)  Min:     Wall iso ABD 10\" x 10    Wall iso FLEX 10\" x 10 Cues for form        Manual Intervention (12648) Min:      Oscillations for relaxation  STM pec and post cuff  PROM ER, IR, scaption and flexion 12' Improved relaxation   catch at ~ 90               Modalities:  Min     Ice  L shld 10 min  recliner          Other Therapeutic Activities:  Pt was educated on PT POC, Diagnosis, Prognosis, pathomechanics as well as frequency and duration of scheduling future physical therapy appointments. Time was also taken on this day to answer all patient questions and participation in PT. Reviewed appointment policy in detail with patient and patient verbalized understanding. Home Exercise Program: Patient instructed in the following for HEP:            Access Code: [email or web address]  URL: [email or web address]  Date: 07/21/2021  Prepared by: Kirsten Wright     Patient verbalized/demonstrated understanding and was issued written handout.       Therapeutic Exercise and NMR EXR  [x] (89758) Provided verbal/tactile cueing for activities related to strengthening, flexibility, endurance, ROM  for improvements in scapular, scapulothoracic and UE control with self care, reaching, carrying, lifting, house/yardwork, driving/computer work.    [] (38528) Provided verbal/tactile cueing for activities related to improving balance, coordination, kinesthetic sense, posture, motor skill, proprioception  to assist with  scapular, scapulothoracic and UE control with self care, reaching, carrying, lifting, house/yardwork, driving/computer work. Therapeutic Activities:    [x] (58373 or 10426) Provided verbal/tactile cueing for activities related to improving balance, coordination, kinesthetic sense, posture, motor skill, proprioception and motor activation to allow for proper function of scapular, scapulothoracic and UE control with self care, carrying, lifting, driving/computer work.      Home Exercise Program:    [x] (82416) Reviewed/Progressed HEP activities related to strengthening, flexibility, endurance, ROM of scapular, scapulothoracic and UE control with self care, reaching, carrying, lifting, house/yardwork, driving/computer work  [] (70196) Reviewed/Progressed HEP activities related to improving balance, coordination, kinesthetic sense, posture, motor skill, proprioception of scapular, scapulothoracic and UE control with self care, reaching, carrying, lifting, house/yardwork, driving/computer work      Manual Treatments:  PROM / STM / Oscillations-Mobs:  G-I, II, III, IV (PA's, Inf., Post.)  [x] (36858) Provided manual therapy to mobilize soft tissue/joints of cervical/CT, scapular GHJ and UE for the purpose of modulating pain, promoting relaxation,  increasing ROM, reducing/eliminating soft tissue swelling/inflammation/restriction, improving soft tissue extensibility and allowing for proper ROM for normal function with self care, reaching, carrying, lifting, house/yardwork, driving/computer work    Charges:  Timed Code Treatment Minutes: 40   Total Treatment Minutes: 50       [] EVAL (LOW) 27430 (typically 20 minutes face-to-face)  [] EVAL (MOD) 83662 (typically 30 minutes face-to-face)  [] EVAL (HIGH) 93175 (typically 45 minutes face-to-face)  [] RE-EVAL     [x] KB(23238) x2     [] Dry needle 1 or 2 Muscles (48978)  [] NMR (37195) x     [] Dry needle 3+ Muscles (36291)  [x] Manual (92498) x 1    [] Ultrasound (05505) x  [] TA (83396) x   [] Mech Traction (00771)  [] ES(attended) (66541)     [] ES (un) (67269):   [] Vasopump (49947)   [] Ionto (56300)   [] Other:    If BW Please Indicate Time In/Out  CPT Code Time in Time out   [id number] 2:45 2: 62   [id number] 2: 62 3: 25                             GOALS:  Patient stated goal: \"Be able to use my arm\"  []? Progressing: []? Met: []? Not Met: []? Adjusted     Therapist goals for Patient:   Short Term Goals: To be achieved in: 2 weeks  1. Independent in HEP and progression per patient tolerance, in order to prevent re-injury. []? Progressing: []? Met: []? Not Met: []? Adjusted  2. Patient will have a decrease in pain to facilitate improvement in movement, function, and ADLs as indicated by Functional Deficits. []? Progressing: []? Met: []? Not Met: []? Adjusted     Long Term Goals: To be achieved in: 8 weeks  1. Disability index score of 42% or less for the UEFSto assist with reaching prior level of function. []? Progressing: []? Met: []? Not Met: []? Adjusted  2. Patient will demonstrate increased AROM to 150 deg flexion and abduction, T2 ER, L1 IR to allow for proper joint functioning as indicated by Functional Deficits. []? Progressing: []? Met: []? Not Met: []? Adjusted  3. Patient will demonstrate an increase in strength to 4/5 grossly at L shoulder for proper functional mobility as indicated by patients Functional Deficits. []? Progressing: []? Met: []? Not Met: []? Adjusted  4. Patient will return to dressing and self care without increased symptoms or restriction. []? Progressing: []? Met: []? Not Met: []? Adjusted  5. Patient will return to house/yard work without increased symptoms or restriction .(patient specific functional goal)           []? Progressing: []? Met: []? Not Met: []? Adjusted    ASSESSMENT:  Pt did well with therex today, able to perform AAROM w/ ranger at the wall without any sharp pain. Continues to benefit from skilled PT to address strengthening to return to full function.     Treatment/Activity Tolerance:  [x] Patient tolerated treatment well [] Patient limited by fatique  [] Patient limited by pain  [] Patient limited by other medical complications  [] Other:     Overall Progression Towards Functional goals/ Treatment Progress Update:  [] Patient is progressing as expected towards functional goals listed. [] Progression is slowed due to complexities/Impairments listed. [] Progression has been slowed due to co-morbidities. [x] Plan just implemented, too soon to assess goals progression <30days   [] Goals require adjustment due to lack of progress  [] Patient is not progressing as expected and requires additional follow up with physician  [] Other    Prognosis for POC: [x] Good [] Fair  [] Poor    Patient requires continued skilled intervention: [x] Yes  [] No      PLAN:   [x] Continue per plan of care [] Alter current plan (see comments)  [] Plan of care initiated [] Hold pending MD visit [] Discharge     Electronically signed by: Karin Ortiz, PTA  [id number]    Note: If patient does not return for scheduled/recommended follow up visits, this note will serve as a discharge from care along with the most recent update on progress.

## 2021-08-16 ENCOUNTER — APPOINTMENT (OUTPATIENT)
Dept: PHYSICAL THERAPY | Age: 51
End: 2021-08-16
Payer: COMMERCIAL

## 2021-08-18 ENCOUNTER — APPOINTMENT (OUTPATIENT)
Dept: PHYSICAL THERAPY | Age: 51
End: 2021-08-18
Payer: COMMERCIAL

## 2021-08-23 ENCOUNTER — HOSPITAL ENCOUNTER (OUTPATIENT)
Dept: PHYSICAL THERAPY | Age: 51
Setting detail: THERAPIES SERIES
Discharge: HOME | End: 2021-08-23
Payer: COMMERCIAL

## 2021-08-23 NOTE — FLOWSHEET NOTE
[facility], [facility]  [address] [place], [facility]  Phone: [phone]   Fax:     [phone]    Physical Therapy  Cancellation/No-show Note  Patient Name:  Raffaele Hudson  :  1970   Date:  2021  Cancelled visits to date: 1  No-shows to date: 1    Patient status for today's appointment patient:  []  Cancelled  []  Rescheduled appointment  [x]  No-show     Reason given by patient:  []  Patient ill  []  Conflicting appointment  []  No transportation    []  Conflict with work  []  No reason given  [x]  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

## 2021-08-25 ENCOUNTER — HOSPITAL ENCOUNTER (OUTPATIENT)
Dept: PHYSICAL THERAPY | Age: 51
Setting detail: THERAPIES SERIES
Discharge: HOME | End: 2021-08-25
Payer: COMMERCIAL

## 2021-08-25 NOTE — FLOWSHEET NOTE
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 Pt wishes to reschedule,  Advised pt  will call him to re-schedule.       []  Patient did not answer, message left as follows:   []  Phone call not made today    Electronically signed by:  Stephie Guzman,

## 2021-08-27 ENCOUNTER — HOSPITAL ENCOUNTER (OUTPATIENT)
Dept: PHYSICAL THERAPY | Age: 51
Setting detail: THERAPIES SERIES
Discharge: HOME | End: 2021-08-27
Payer: COMMERCIAL

## 2021-08-27 PROCEDURE — 97110 THERAPEUTIC EXERCISES: CPT

## 2021-08-27 PROCEDURE — 97140 MANUAL THERAPY 1/> REGIONS: CPT

## 2021-08-27 NOTE — FLOWSHEET NOTE
[facility], [facility]  [address] [place], [facility]  Phone: [phone]   Fax:     [phone]        Physical Therapy Treatment Note/ Progress Report:       Date:  2021    Patient Name:  Santos Jackson    :  1970  MRN: [id number]    Pertinent Medical History:     Medical/Treatment Diagnosis Information:  Diagnosis: Z98.890 (ICD-10-CM) - Other specified postprocedural states  Treatment Diagnosis: L shoulder pain (S17.347)    Insurance/Certification information:  PT Insurance Information: Cal Stallings  Physician Information:  Referring Practitioner: Dr. Jamil [facility] of care signed (Y/N):     Date of Patient follow up with Physician:      Progress Report: []  Yes  [x]  No     Date Range for reporting period:  Beginnin2021  Ending:      Progress report due (10 Rx/or 30 days whichever is less):     Recertification due (POC duration/ or 90 days whichever is less): 21     Visit # POC/Insurance Allowable Auth Needed   6 WC- no confirmation received at this time for coverage- per pt  says it will be []Yes    []No     Functional Outcomes Measure:    Date Assessed: 21  Test: UEFS  Score:18/80    Pain level: 3/10     History of Injury: RTC repair May 25,2021, second surgery following a workman's compensation claim. , initial surgery     SUBJECTIVE:    21 shoulder feels stiff today. Saw MD, please with progress thus far, F/U again in 6 weeks. OR note 21   Full-thickness complete tear of the supraspinatus that was right at the level of glenoid,  I used a double row technique with 2 medial row anchors and 3 lateral row anchors.       OBJECTIVE:    Observation:   Test measurements: PROM full for flexion, near full abduction, improving IR/ER       **NEEDS UPDATED MEASUREMENTS NV**    RESTRICTIONS/PRECAUTIONS: Supraspinatus RTC repair 21- OK to wean from sling at this time    Exercises/Interventions:   Therapeutic Ex (47524)  Min: Resistance/Reps Notes/Cues   Pulleys      Cane  Extension  IR  Pull across   3' flex  2' scapt      10  10  10    Supine cane press and flexion 10\" x 10    Supine cane ER @ 30 deg abd 10\" x 10    SL ER 0# 2 x 10 Cues for scap retraction        [place] at wall for flexion 10x    [place] on floor 3D reach and CW/CCW circles 10x ea       GTB row 3\" x 30 Cues for avoiding shrug   RTB ext 3\" x 30 Cues for form initially   Bicep curl  3#  2 x 10     Small range flex/ext from 90 deg and abd/add    Ball CW/CCW 90 deg supine  Cw/ccw 90 deg supine 1#x10     X7    10 eachR/L kelvin    Small circles 10 each 1#, supine    Catching noted with abd/ add  At 7 reps     Flexion aarom supine to 90 deg to avoid catching    AI 90 deg flexion supine     10      2 min         Therapeutic Activity (66434) Min:                     NMR re-education (36056)  Min:     Wall iso ABD 10\" x 10    Wall iso FLEX 10\" x 10 Cues for form        Manual Intervention (82091) Min:      Oscillations for relaxation  STM pec and post cuff  PROM ER, IR, scaption and flexion 12' Improved relaxation   catch at ~ 90               Modalities:  Min     Ice  L shld 10 min  recliner          Other Therapeutic Activities:  Pt was educated on PT POC, Diagnosis, Prognosis, pathomechanics as well as frequency and duration of scheduling future physical therapy appointments. Time was also taken on this day to answer all patient questions and participation in PT. Reviewed appointment policy in detail with patient and patient verbalized understanding. Home Exercise Program: Patient instructed in the following for HEP:            Access Code: [email or web address]  URL: [email or web address]  Date: 07/21/2021  Prepared by: Steph Wallace     Patient verbalized/demonstrated understanding and was issued written handout.       Therapeutic Exercise and NMR EXR  [x] (65265) Provided verbal/tactile cueing for activities related to strengthening, flexibility, endurance, ROM  for improvements in scapular, scapulothoracic and UE control with self care, reaching, carrying, lifting, house/yardwork, driving/computer work.    [] (57762) Provided verbal/tactile cueing for activities related to improving balance, coordination, kinesthetic sense, posture, motor skill, proprioception  to assist with  scapular, scapulothoracic and UE control with self care, reaching, carrying, lifting, house/yardwork, driving/computer work. Therapeutic Activities:    [x] (77867 or 93361) Provided verbal/tactile cueing for activities related to improving balance, coordination, kinesthetic sense, posture, motor skill, proprioception and motor activation to allow for proper function of scapular, scapulothoracic and UE control with self care, carrying, lifting, driving/computer work.      Home Exercise Program:    [x] (03186) Reviewed/Progressed HEP activities related to strengthening, flexibility, endurance, ROM of scapular, scapulothoracic and UE control with self care, reaching, carrying, lifting, house/yardwork, driving/computer work  [] (95255) Reviewed/Progressed HEP activities related to improving balance, coordination, kinesthetic sense, posture, motor skill, proprioception of scapular, scapulothoracic and UE control with self care, reaching, carrying, lifting, house/yardwork, driving/computer work      Manual Treatments:  PROM / STM / Oscillations-Mobs:  G-I, II, III, IV (PA's, Inf., Post.)  [x] (63520) Provided manual therapy to mobilize soft tissue/joints of cervical/CT, scapular GHJ and UE for the purpose of modulating pain, promoting relaxation,  increasing ROM, reducing/eliminating soft tissue swelling/inflammation/restriction, improving soft tissue extensibility and allowing for proper ROM for normal function with self care, reaching, carrying, lifting, house/yardwork, driving/computer work    Charges:  Timed Code Treatment Minutes: 40 Total Treatment Minutes: 50       [] EVAL (LOW) 68752 (typically 20 minutes face-to-face)  [] EVAL (MOD) 58117 (typically 30 minutes face-to-face)  [] EVAL (HIGH) 49730 (typically 45 minutes face-to-face)  [] RE-EVAL     [x] OC(54937) x2     [] Dry needle 1 or 2 Muscles (67680)  [] NMR (13218) x     [] Dry needle 3+ Muscles (73532)  [x] Manual (80075) x 1    [] Ultrasound (96038) x  [] TA (58556) x   [] Mech Traction (78596)  [] ES(attended) (96459)     [] ES (un) (30577):   [] Vasopump (38692)   [] Ionto (54103)   [] Other:    If BWC Please Indicate Time In/Out  CPT Code Time in Time out   [id number] 145 200   Therex-52482 115 145                             GOALS:  Patient stated goal: \"Be able to use my arm\"  [x]? Progressing: []? Met: []? Not Met: []? Adjusted     Therapist goals for Patient:   Short Term Goals: To be achieved in: 2 weeks  1. Independent in HEP and progression per patient tolerance, in order to prevent re-injury. [x]? Progressing: []? Met: []? Not Met: []? Adjusted  2. Patient will have a decrease in pain to facilitate improvement in movement, function, and ADLs as indicated by Functional Deficits. [x]? Progressing: []? Met: []? Not Met: []? Adjusted     Long Term Goals: To be achieved in: 8 weeks  1. Disability index score of 42% or less for the UEFSto assist with reaching prior level of function. [x]? Progressing: []? Met: []? Not Met: []? Adjusted  2. Patient will demonstrate increased AROM to 150 deg flexion and abduction, T2 ER, L1 IR to allow for proper joint functioning as indicated by Functional Deficits. [x]? Progressing: []? Met: []? Not Met: []? Adjusted  3. Patient will demonstrate an increase in strength to 4/5 grossly at L shoulder for proper functional mobility as indicated by patients Functional Deficits. [x]? Progressing: []? Met: []? Not Met: []? Adjusted  4. Patient will return to dressing and self care without increased symptoms or restriction. [x]? Progressing: []? Met: []? Not Met: []? Adjusted  5. Patient will return to house/yard work without increased symptoms or restriction .(patient specific functional goal)           [x]? Progressing: []? Met: []? Not Met: []? Adjusted    ASSESSMENT:  Pt did well with therex today, able to perform AAROM w/ ranger at the wall without any sharp pain. Continues to benefit from skilled PT to address strengthening to return to full function. Treatment/Activity Tolerance:  [x] Patient tolerated treatment well [] Patient limited by fatique  [] Patient limited by pain  [] Patient limited by other medical complications  [] Other:     Overall Progression Towards Functional goals/ Treatment Progress Update:  [x] Patient is progressing as expected towards functional goals listed. [] Progression is slowed due to complexities/Impairments listed. [] Progression has been slowed due to co-morbidities. [] Plan just implemented, too soon to assess goals progression <30days   [] Goals require adjustment due to lack of progress  [] Patient is not progressing as expected and requires additional follow up with physician  [] Other    Prognosis for POC: [x] Good [] Fair  [] Poor    Patient requires continued skilled intervention: [x] Yes  [] No      PLAN:   [x] Continue per plan of care [] Alter current plan (see comments)  [] Plan of care initiated [] Hold pending MD visit [] Discharge     Electronically signed by: Taurus Shaikh PT  DPT, MS  [id number]    Note: If patient does not return for scheduled/recommended follow up visits, this note will serve as a discharge from care along with the most recent update on progress.

## 2021-09-09 ENCOUNTER — APPOINTMENT (OUTPATIENT)
Dept: PHYSICAL THERAPY | Age: 51
End: 2021-09-09
Payer: COMMERCIAL

## 2021-09-14 ENCOUNTER — HOSPITAL ENCOUNTER (OUTPATIENT)
Dept: PHYSICAL THERAPY | Age: 51
Setting detail: THERAPIES SERIES
Discharge: HOME | End: 2021-09-14
Payer: COMMERCIAL

## 2021-09-14 PROCEDURE — 97110 THERAPEUTIC EXERCISES: CPT

## 2021-09-14 PROCEDURE — 97032 APPL MODALITY 1+ESTIM EA 15: CPT

## 2021-09-14 NOTE — FLOWSHEET NOTE
improving IR/ER       **NEEDS UPDATED MEASUREMENTS NV**    RESTRICTIONS/PRECAUTIONS: Supraspinatus RTC repair 5/25/21- OK to wean from sling at this time    Exercises/Interventions:   Therapeutic Ex (59118)  Min: Resistance/Reps Notes/Cues   Pulleys      Cane  Extension  IR  Posterior Pull across   3' flex  2' scapt      10  10      Supine cane press and flexion 10\" x 10    Supine cane ER @ 30 deg abd 10\" x 10    SL ER 0# 2 x 10 Cues for scap retraction        Hamilton at wall for flexion 10x    Hamilton on floor 3D reach and CW/CCW circles 10x ea       GTB row 3\" x 30 Cues for avoiding shrug   RTB ext 3\" x 30 Cues for form initially   Bicep curl  3#  2 x 10     Small range flex/ext from 90 deg and abd/add    Ball CW/CCW 90 deg supine  Cw/ccw 90 deg supine  Hold today due to inc shoulder  pain  X7    10 eachR/L kelvin    Small circles 10 each 1#, supine    Catching noted with abd/ add  At 7 reps     Flexion aarom supine to 90 deg to avoid catching    AI 90 deg flexion supine     10      2 min         Therapeutic Activity (10368) Min:                     NMR re-education (51768)  Min:     Wall iso ABD 10\" x 10    Wall iso FLEX 10\" x 10 Cues for form        Manual Intervention (47598) Min:      Oscillations for relaxation  STM pec and post cuff  PROM ER, IR, scaption and flexion 12' Improved relaxation   catch at ~ 90               Modalities:  Min     Ice  L shld 10 min  recliner          Other Therapeutic Activities:  Pt was educated on PT POC, Diagnosis, Prognosis, pathomechanics as well as frequency and duration of scheduling future physical therapy appointments. Time was also taken on this day to answer all patient questions and participation in PT. Reviewed appointment policy in detail with patient and patient verbalized understanding. Home Exercise Program: Patient instructed in the following for HEP:            Access Code: [email or web address]  URL: [email or web address]  Date: 07/21/2021  Prepared by: Jessica Mota Francy     Patient verbalized/demonstrated understanding and was issued written handout. Therapeutic Exercise and NMR EXR  [x] (48536) Provided verbal/tactile cueing for activities related to strengthening, flexibility, endurance, ROM  for improvements in scapular, scapulothoracic and UE control with self care, reaching, carrying, lifting, house/yardwork, driving/computer work.    [] (29756) Provided verbal/tactile cueing for activities related to improving balance, coordination, kinesthetic sense, posture, motor skill, proprioception  to assist with  scapular, scapulothoracic and UE control with self care, reaching, carrying, lifting, house/yardwork, driving/computer work. Therapeutic Activities:    [x] (71511 or 22517) Provided verbal/tactile cueing for activities related to improving balance, coordination, kinesthetic sense, posture, motor skill, proprioception and motor activation to allow for proper function of scapular, scapulothoracic and UE control with self care, carrying, lifting, driving/computer work.      Home Exercise Program:    [x] (75225) Reviewed/Progressed HEP activities related to strengthening, flexibility, endurance, ROM of scapular, scapulothoracic and UE control with self care, reaching, carrying, lifting, house/yardwork, driving/computer work  [] (12891) Reviewed/Progressed HEP activities related to improving balance, coordination, kinesthetic sense, posture, motor skill, proprioception of scapular, scapulothoracic and UE control with self care, reaching, carrying, lifting, house/yardwork, driving/computer work      Manual Treatments:  PROM / STM / Oscillations-Mobs:  G-I, II, III, IV (PA's, Inf., Post.)  [x] (47246) Provided manual therapy to mobilize soft tissue/joints of cervical/CT, scapular GHJ and UE for the purpose of modulating pain, promoting relaxation,  increasing ROM, reducing/eliminating soft tissue swelling/inflammation/restriction, improving soft tissue extensibility and allowing for proper ROM for normal function with self care, reaching, carrying, lifting, house/yardwork, driving/computer work    Charges:  Timed Code Treatment Minutes: 45   Total Treatment Minutes: 55       [] EVAL (LOW) 02801 (typically 20 minutes face-to-face)  [] EVAL (MOD) 75202 (typically 30 minutes face-to-face)  [] EVAL (HIGH) 67849 (typically 45 minutes face-to-face)  [] RE-EVAL     [x] KW(35281) x2     [] Dry needle 1 or 2 Muscles (99887)  [] NMR (40597) x     [] Dry needle 3+ Muscles (48957)  [x] Manual (75372) x 1    [] Ultrasound (15214) x  [] TA (72987) x   [] Mech Traction (27848)  [] ES(attended) (53640)     [] ES (un) (38917):   [] Vasopump (81673)   [] Ionto (60413)   [] Other:    If [facility] Please Indicate Time In/Out  CPT Code Time in Time out   [id number] [phone]   [facility]-[id number] [phone]                             GOALS:  Patient stated goal: \"Be able to use my arm\"  [x]? Progressing: []? Met: []? Not Met: []? Adjusted     Therapist goals for Patient:   Short Term Goals: To be achieved in: 2 weeks  1. Independent in HEP and progression per patient tolerance, in order to prevent re-injury. [x]? Progressing: []? Met: []? Not Met: []? Adjusted  2. Patient will have a decrease in pain to facilitate improvement in movement, function, and ADLs as indicated by Functional Deficits. [x]? Progressing: []? Met: []? Not Met: []? Adjusted     Long Term Goals: To be achieved in: 8 weeks  1. Disability index score of 42% or less for the UEFSto assist with reaching prior level of function. [x]? Progressing: []? Met: []? Not Met: []? Adjusted  2. Patient will demonstrate increased AROM to 150 deg flexion and abduction, T2 ER, L1 IR to allow for proper joint functioning as indicated by Functional Deficits. [x]? Progressing: []? Met: []? Not Met: []? Adjusted  3.  Patient will demonstrate an increase in strength to 4/5 grossly at L shoulder for proper functional mobility as indicated by patients Functional Deficits. [x]? Progressing: []? Met: []? Not Met: []? Adjusted  4. Patient will return to dressing and self care without increased symptoms or restriction. [x]? Progressing: []? Met: []? Not Met: []? Adjusted  5. Patient will return to house/yard work without increased symptoms or restriction .(patient specific functional goal)           [x]? Progressing: []? Met: []? Not Met: []? Adjusted    ASSESSMENT:  Pt did well with therex today, able to perform AAROM w/ ranger at the wall without any sharp pain. Continues to benefit from skilled PT to address strengthening to return to full function. Treatment/Activity Tolerance:  [x] Patient tolerated treatment well [] Patient limited by fatique  [] Patient limited by pain  [] Patient limited by other medical complications  [x] Other: 9/14: Encouraged pt to apply ice to left shoulder frequently, 15 minutes on/ remaining 45 min off,  throughout the day especially since the increase in pain. Pt verbalized understanding. Overall Progression Towards Functional goals/ Treatment Progress Update:  [x] Patient is progressing as expected towards functional goals listed. [] Progression is slowed due to complexities/Impairments listed. [] Progression has been slowed due to co-morbidities.   [] Plan just implemented, too soon to assess goals progression <30days   [] Goals require adjustment due to lack of progress  [] Patient is not progressing as expected and requires additional follow up with physician  [] Other    Prognosis for POC: [x] Good [] Fair  [] Poor    Patient requires continued skilled intervention: [x] Yes  [] No      PLAN:   [x] Continue per plan of care [] Alter current plan (see comments)  [] Plan of care initiated [] Hold pending MD visit [] Discharge     Electronically signed by: Angelina Randle, PTA  [id number]    Note: If patient does not return for scheduled/recommended follow up visits, this note will serve as a discharge from care along with the most recent update on progress.

## 2021-09-21 ENCOUNTER — HOSPITAL ENCOUNTER (OUTPATIENT)
Dept: PHYSICAL THERAPY | Age: 51
Setting detail: THERAPIES SERIES
Discharge: HOME | End: 2021-09-21
Payer: COMMERCIAL

## 2021-09-21 PROCEDURE — 97110 THERAPEUTIC EXERCISES: CPT

## 2021-09-21 NOTE — FLOWSHEET NOTE
[facility], [facility]  [address] [place], [facility]  Phone: [phone]   Fax:     [phone]        Physical Therapy Treatment Note/ Progress Report:       Date:  2021    Patient Name:  Santos Jackson    :  1970  MRN: [id number]    Pertinent Medical History:     Medical/Treatment Diagnosis Information:  Diagnosis: Z98.890 (ICD-10-CM) - Other specified postprocedural states  Treatment Diagnosis: L shoulder pain (B54.239)    Insurance/Certification information:  PT Insurance Information: Cal Stallings  Physician Information:  Referring Practitioner: Dr. Jamil [facility] of care signed (Y/N):     Date of Patient follow up with Physician:      Progress Report: []  Yes  [x]  No     Date Range for reporting period:  Beginnin2021  Ending:      Progress report due (10 Rx/or 30 days whichever is less):     Recertification due (POC duration/ or 90 days whichever is less): 21     Visit # POC/Insurance Allowable Auth Needed   9 WC- no confirmation received at this time for coverage- per pt  says it will be []Yes    []No     Functional Outcomes Measure:    Date Assessed: 21  Test: UEFS  Score:43/80     Pain level: 0-2/10     History of Injury: RTC repair May 25,2021, second surgery following a workman's compensation claim. , initial surgery     SUBJECTIVE: Pt states he still hasn't seen the MD, states \"I couldn't get through to them, will call today\". Pt educated at this point in time he has been coming to therapy since 21 and has only attended 9 sessions, at this point he needs to go back to see the doctor before continuing with therapy.     OBJECTIVE:    Observation:   Test measurements: PROM full for flexion, near full abduction, improving IR/ER      AROM: 160 deg flexion, 155 deg abduction, L3 IR, T1 ER   Strength: 4-/5 flexion, 4-/5 scaption, 4-/5 IR, 4-/5 ER    RESTRICTIONS/PRECAUTIONS: Supraspinatus RTC repair 5/25/21- OK to wean from sling at this time    Exercises/Interventions:   Therapeutic Ex (85569)  Min: Resistance/Reps Notes/Cues   Pulleys         3' flex              Standing shoulder IR strap stretch 10\" x 10          Supine flexion 1# 2 x 10         SL ER 1# 2 x 10 Cues for scap retraction   SL ABD 2 x 10              GTB shoulder IR 3 x 10    GTB row 3\" x 30 Cues for avoiding shrug   RTB ext 3\" x 30 Cues for form initially   Bicep curl  5#  2 x 10                    Therapeutic Activity (70584) Min:                     NMR re-education (89195)  Min:     Wall pushups 2 x 10              Manual Intervention (47397) Min:                Modalities:  Min     Ice  L shld 10 min  recliner          Other Therapeutic Activities:  Pt was educated on PT POC, Diagnosis, Prognosis, pathomechanics as well as frequency and duration of scheduling future physical therapy appointments. Time was also taken on this day to answer all patient questions and participation in PT. Reviewed appointment policy in detail with patient and patient verbalized understanding. Home Exercise Program: Patient instructed in the following for HEP:            Access Code: [email or web address]  URL: [email or web address]  Date: 07/21/2021  Prepared by: John Omer     Patient verbalized/demonstrated understanding and was issued written handout.       Therapeutic Exercise and NMR EXR  [x] (68391) Provided verbal/tactile cueing for activities related to strengthening, flexibility, endurance, ROM  for improvements in scapular, scapulothoracic and UE control with self care, reaching, carrying, lifting, house/yardwork, driving/computer work.    [] (17270) Provided verbal/tactile cueing for activities related to improving balance, coordination, kinesthetic sense, posture, motor skill, proprioception  to assist with  scapular, scapulothoracic and UE control with self care, reaching, carrying, lifting, house/yardwork, driving/computer work. Therapeutic Activities:    [x] (58882 or 24630) Provided verbal/tactile cueing for activities related to improving balance, coordination, kinesthetic sense, posture, motor skill, proprioception and motor activation to allow for proper function of scapular, scapulothoracic and UE control with self care, carrying, lifting, driving/computer work.      Home Exercise Program:    [x] (84958) Reviewed/Progressed HEP activities related to strengthening, flexibility, endurance, ROM of scapular, scapulothoracic and UE control with self care, reaching, carrying, lifting, house/yardwork, driving/computer work  [] (76902) Reviewed/Progressed HEP activities related to improving balance, coordination, kinesthetic sense, posture, motor skill, proprioception of scapular, scapulothoracic and UE control with self care, reaching, carrying, lifting, house/yardwork, driving/computer work      Manual Treatments:  PROM / STM / Oscillations-Mobs:  G-I, II, III, IV (PA's, Inf., Post.)  [x] (47042) Provided manual therapy to mobilize soft tissue/joints of cervical/CT, scapular GHJ and UE for the purpose of modulating pain, promoting relaxation,  increasing ROM, reducing/eliminating soft tissue swelling/inflammation/restriction, improving soft tissue extensibility and allowing for proper ROM for normal function with self care, reaching, carrying, lifting, house/yardwork, driving/computer work    Charges:  Timed Code Treatment Minutes: 33   Total Treatment Minutes: 43       [] EVAL (LOW) 45423 (typically 20 minutes face-to-face)  [] EVAL (MOD) 47189 (typically 30 minutes face-to-face)  [] EVAL (HIGH) 58881 (typically 45 minutes face-to-face)  [] RE-EVAL     [x] LD(56552) x2     [] Dry needle 1 or 2 Muscles (64128)  [] NMR (75414) x     [] Dry needle 3+ Muscles (06199)  [] Manual (18383) x    [] Ultrasound (52568) x  [] TA (41873) x   [] Mech Traction (20537)  [] ES(attended) (54452)     [] ES (un) (54485):   [] Vasopump (89271)   [] Ionto (58953)   [] Other:    If [facility] Please Indicate Time In/Out  CPT Code Time in Time out   Therex-[id number] 11:00 am 11:33 am                             GOALS:  Patient stated goal: \"Be able to use my arm\"  [x]? Progressing: []? Met: []? Not Met: []? Adjusted     Therapist goals for Patient:   Short Term Goals: To be achieved in: 2 weeks  1. Independent in HEP and progression per patient tolerance, in order to prevent re-injury. [x]? Progressing: []? Met: []? Not Met: []? Adjusted  2. Patient will have a decrease in pain to facilitate improvement in movement, function, and ADLs as indicated by Functional Deficits. [x]? Progressing: []? Met: []? Not Met: []? Adjusted     Long Term Goals: To be achieved in: 8 weeks  1. Disability index score of 42% or less for the UEFS to assist with reaching prior level of function. []? Progressing: [x]? Met: []? Not Met: []? Adjusted  2. Patient will demonstrate increased AROM to 150 deg flexion and abduction, T2 ER, L1 IR to allow for proper joint functioning as indicated by Functional Deficits. []? Progressing: [x]? Met: []? Not Met: []? Adjusted  3. Patient will demonstrate an increase in strength to 4/5 grossly at L shoulder for proper functional mobility as indicated by patients Functional Deficits. [x]? Progressing: []? Met: []? Not Met: []? Adjusted  4. Patient will return to dressing and self care without increased symptoms or restriction. []? Progressing: [x]? Met: []? Not Met: []? Adjusted  5. Patient will return to house/yard work without increased symptoms or restriction .(patient specific functional goal)           [x]? Progressing: []? Met: []? Not Met: []? Adjusted    ASSESSMENT:  Pt was reevaluated today as he is outside of his POC. Discussed that based on his attendance with therapy since June and duration since surgery he does need to follow up with his surgeon before continuing PT.   Focused session on strength as that is where most of his limitations are at this time. Treatment/Activity Tolerance:  [x] Patient tolerated treatment well [] Patient limited by fatique  [] Patient limited by pain  [] Patient limited by other medical complications  [] Other:     Overall Progression Towards Functional goals/ Treatment Progress Update:  [x] Patient is progressing as expected towards functional goals listed. [] Progression is slowed due to complexities/Impairments listed. [] Progression has been slowed due to co-morbidities. [] Plan just implemented, too soon to assess goals progression <30days   [] Goals require adjustment due to lack of progress  [] Patient is not progressing as expected and requires additional follow up with physician  [] Other    Prognosis for POC: [x] Good [] Fair  [] Poor    Patient requires continued skilled intervention: [x] Yes  [] No      PLAN:  Pt to follow up with MD prior to resuming PT  [] Continue per plan of care [] Alter current plan (see comments)  [] Plan of care initiated [x] Hold pending MD visit [] Discharge     Electronically signed by: Emili Collins PT,DPT [id number]    Note: If patient does not return for scheduled/recommended follow up visits, this note will serve as a discharge from care along with the most recent update on progress.

## 2021-09-22 ENCOUNTER — TELEPHONE (OUTPATIENT)
Dept: ORTHOPEDIC SURGERY | Age: 51
End: 2021-09-22

## 2021-09-22 NOTE — TELEPHONE ENCOUNTER
GAVE A NO RECORDS FOR DATE REQUESTED STATEMENT 01/14/2019 TO THE PRESENT TO [facility] TO SCAN IN [facility] TO Julio César Roth

## 2021-09-28 ENCOUNTER — APPOINTMENT (OUTPATIENT)
Dept: PHYSICAL THERAPY | Age: 51
End: 2021-09-28
Payer: COMMERCIAL

## 2021-09-30 ENCOUNTER — HOSPITAL ENCOUNTER (OUTPATIENT)
Dept: PHYSICAL THERAPY | Age: 51
Setting detail: THERAPIES SERIES
Discharge: HOME | End: 2021-09-30
Payer: COMMERCIAL

## 2021-09-30 PROCEDURE — 97110 THERAPEUTIC EXERCISES: CPT

## 2021-09-30 NOTE — FLOWSHEET NOTE
[facility], [facility]  [address] [facility] [place], [facility]  Phone: [phone]   Fax:     [phone]        Physical Therapy Treatment Note/ Progress Report:       Date:  2021    Patient Name:  Alicia Kline    :  1970  MRN: [id number]    Pertinent Medical History:     Medical/Treatment Diagnosis Information:  Diagnosis: Z98.890 (ICD-10-CM) - Other specified postprocedural states  Treatment Diagnosis: L shoulder pain (M42.281)    Insurance/Certification information:  PT Insurance Information: Wesley He  Physician Information:  Referring Practitioner: Dr. Catherine Short of care signed (Y/N):     Date of Patient follow up with Physician:      Progress Report: []  Yes  [x]  No     Date Range for reporting period:  Beginnin2021  Ending:      Progress report due (10 Rx/or 30 days whichever is less):     Recertification due (POC duration/ or 90 days whichever is less):10/30    Visit # POC/Insurance Allowable Auth Needed   10 WC- no confirmation received at this time for coverage- per pt  says it will be []Yes    []No     Functional Outcomes Measure:    Date Assessed: 21  Test: UEFS  Score:43/80     Pain level: 0-2/10     History of Injury: RTC repair May 25,2021, second surgery following a workman's compensation claim. , initial surgery     SUBJECTIVE: Pt states he was really sore after last visit, has not done exercises since then. States the doctor told him to continue with PT and back off if exercises hurt.     OBJECTIVE:    Observation:   Test measurements: NTT    RESTRICTIONS/PRECAUTIONS: Supraspinatus RTC repair 21- OK to wean from sling at this time    Exercises/Interventions:   Therapeutic Ex (04886)  Min: Resistance/Reps Notes/Cues   Pulleys       3' flex         Supine cane press and flexion 10\" x 10    Supine cane ER @ 30 deg abd 10\" x 10    Supine flexion  2 x 10 No wt today- attempted standing but unable   Wall slide flex w/ liftoff  Wall slide scaption 10x  10x    Unable to do d/t discomfort as pt reports he has either an infected bug bite or staph infection under his arm   SL ABD 1 x 10 Limited reps d/t pain in underam                RTB high row 3\" x 30 Cues for avoiding shrug   RTB ext 3\" x 20 Cues for form initially   RTB tricep ext 20x                      Therapeutic Activity (25110) Min:                     NMR re-education (34551)  Min:     Wall pushups 2 x 10    Stability taps at wall 10x ea         Manual Intervention (77402) Min:                Modalities:  Min     Ice  L shld 10 min  recliner          Other Therapeutic Activities:  Pt was educated on PT POC, Diagnosis, Prognosis, pathomechanics as well as frequency and duration of scheduling future physical therapy appointments. Time was also taken on this day to answer all patient questions and participation in PT. Reviewed appointment policy in detail with patient and patient verbalized understanding. Home Exercise Program: Patient instructed in the following for HEP:            Access Code: [email or web address]  URL: [email or web address]  Date: 07/21/2021  Prepared by: Tez Kngiht     Patient verbalized/demonstrated understanding and was issued written handout. Therapeutic Exercise and NMR EXR  [x] (43634) Provided verbal/tactile cueing for activities related to strengthening, flexibility, endurance, ROM  for improvements in scapular, scapulothoracic and UE control with self care, reaching, carrying, lifting, house/yardwork, driving/computer work.    [] (48270) Provided verbal/tactile cueing for activities related to improving balance, coordination, kinesthetic sense, posture, motor skill, proprioception  to assist with  scapular, scapulothoracic and UE control with self care, reaching, carrying, lifting, house/yardwork, driving/computer work.     Therapeutic Activities:    [x] (79748 or 18094) Provided verbal/tactile cueing for activities related to improving balance, coordination, kinesthetic sense, posture, motor skill, proprioception and motor activation to allow for proper function of scapular, scapulothoracic and UE control with self care, carrying, lifting, driving/computer work.      Home Exercise Program:    [x] (62837) Reviewed/Progressed HEP activities related to strengthening, flexibility, endurance, ROM of scapular, scapulothoracic and UE control with self care, reaching, carrying, lifting, house/yardwork, driving/computer work  [] (91942) Reviewed/Progressed HEP activities related to improving balance, coordination, kinesthetic sense, posture, motor skill, proprioception of scapular, scapulothoracic and UE control with self care, reaching, carrying, lifting, house/yardwork, driving/computer work      Manual Treatments:  PROM / STM / Oscillations-Mobs:  G-I, II, III, IV (PA's, Inf., Post.)  [x] (86452) Provided manual therapy to mobilize soft tissue/joints of cervical/CT, scapular GHJ and UE for the purpose of modulating pain, promoting relaxation,  increasing ROM, reducing/eliminating soft tissue swelling/inflammation/restriction, improving soft tissue extensibility and allowing for proper ROM for normal function with self care, reaching, carrying, lifting, house/yardwork, driving/computer work    Charges:  Timed Code Treatment Minutes: 30   Total Treatment Minutes: 30       [] EVAL (LOW) 67389 (typically 20 minutes face-to-face)  [] EVAL (MOD) 65047 (typically 30 minutes face-to-face)  [] EVAL (HIGH) 57418 (typically 45 minutes face-to-face)  [] RE-EVAL     [x] LD(53556) x2     [] Dry needle 1 or 2 Muscles (04055)  [] NMR (43271) x     [] Dry needle 3+ Muscles (21473)  [] Manual (83229) x    [] Ultrasound (92836) x  [] TA (15933) x   [] Mech Traction (06534)  [] ES(attended) (75765)     [] ES (un) (82805):   [] Vasopump (38937)   [] Ionto (92652)   [] Other:    If [facility] Please Indicate Time In/Out  CPT Code Time in Time out   [id number] 11:00 am 11:33 am                             GOALS:  Patient stated goal: \"Be able to use my arm\"  [x]? Progressing: []? Met: []? Not Met: []? Adjusted     Therapist goals for Patient:   Short Term Goals: To be achieved in: 2 weeks  1. Independent in HEP and progression per patient tolerance, in order to prevent re-injury. [x]? Progressing: []? Met: []? Not Met: []? Adjusted  2. Patient will have a decrease in pain to facilitate improvement in movement, function, and ADLs as indicated by Functional Deficits. [x]? Progressing: []? Met: []? Not Met: []? Adjusted     Long Term Goals: To be achieved in: 8 weeks  1. Disability index score of 42% or less for the UEFS to assist with reaching prior level of function. []? Progressing: [x]? Met: []? Not Met: []? Adjusted  2. Patient will demonstrate increased AROM to 150 deg flexion and abduction, T2 ER, L1 IR to allow for proper joint functioning as indicated by Functional Deficits. []? Progressing: [x]? Met: []? Not Met: []? Adjusted  3. Patient will demonstrate an increase in strength to 4/5 grossly at L shoulder for proper functional mobility as indicated by patients Functional Deficits. [x]? Progressing: []? Met: []? Not Met: []? Adjusted  4. Patient will return to dressing and self care without increased symptoms or restriction. []? Progressing: [x]? Met: []? Not Met: []? Adjusted  5. Patient will return to house/yard work without increased symptoms or restriction .(patient specific functional goal)           [x]? Progressing: []? Met: []? Not Met: []? Adjusted    ASSESSMENT:  Pt continues to fatigue with strength work. Limited with side lying exercises d/t reported infection under his arm.     Treatment/Activity Tolerance:  [x] Patient tolerated treatment well [] Patient limited by fatique  [] Patient limited by pain  [] Patient limited by other medical complications  [] Other:     Overall Progression Towards Functional goals/ Treatment Progress Update:  [x] Patient is progressing as expected towards functional goals listed. [] Progression is slowed due to complexities/Impairments listed. [] Progression has been slowed due to co-morbidities. [] Plan just implemented, too soon to assess goals progression <30days   [] Goals require adjustment due to lack of progress  [] Patient is not progressing as expected and requires additional follow up with physician  [] Other    Prognosis for POC: [x] Good [] Fair  [] Poor    Patient requires continued skilled intervention: [x] Yes  [] No      PLAN:  cont 1-2x/wk for additional 3-4 wks then dc  [] Continue per plan of care [] Alter current plan (see comments)  [] Plan of care initiated [] Hold pending MD visit [] Discharge     Electronically signed by: Awa Geronimo PT,DPT [id number]    Note: If patient does not return for scheduled/recommended follow up visits, this note will serve as a discharge from care along with the most recent update on progress.

## 2021-10-12 ENCOUNTER — HOSPITAL ENCOUNTER (OUTPATIENT)
Dept: PHYSICAL THERAPY | Age: 51
Setting detail: THERAPIES SERIES
Discharge: HOME | End: 2021-10-12
Payer: COMMERCIAL

## 2021-10-12 PROCEDURE — 97110 THERAPEUTIC EXERCISES: CPT | Performed by: CHIROPRACTOR

## 2021-10-12 PROCEDURE — 97140 MANUAL THERAPY 1/> REGIONS: CPT | Performed by: CHIROPRACTOR

## 2021-10-12 NOTE — FLOWSHEET NOTE
[facility], [facility]  [address] [facility] [place], [facility]  Phone: [phone]   Fax:     [phone]        Physical Therapy Treatment Note/ Progress Report:       Date:  10/12/2021    Patient Name:  Sade Elder    :  1970  MRN: [id number]    Pertinent Medical History:     Medical/Treatment Diagnosis Information:  Diagnosis: Z98.890 (ICD-10-CM) - Other specified postprocedural states  Treatment Diagnosis: L shoulder pain (C99.968)    Insurance/Certification information:  PT Insurance Information: Omero Meneses  Physician Information:  Referring Practitioner: Dr. Kelly Finley of care signed (Y/N):     Date of Patient follow up with Physician:      Progress Report: []  Yes  [x]  No     Date Range for reporting period:  Beginnin2021  Ending:       Progress report due (10 Rx/or 30 days whichever is less): 0/65/10    Recertification due (POC duration/ or 90 days whichever is less):10/30    Visit # POC/Insurance Allowable Auth Needed   11 WC- no confirmation received at this time for coverage- per pt  says it will be []Yes    []No     Functional Outcomes Measure:    Date Assessed: 21  Test: UEFS  Score:43/80     Pain level: 0/10     History of Injury: RTC repair May 25,2021, second surgery following a workman's compensation claim. , initial surgery     SUBJECTIVE: Pt states he was really sore after last visit, has not done exercises since then. States the doctor told him to continue with PT and back off if exercises hurt.       OBJECTIVE:    Observation:   Test measurements: NTT    RESTRICTIONS/PRECAUTIONS: Supraspinatus RTC repair 21- OK to wean from sling at this time    Exercises/Interventions:   Therapeutic Ex (47758)  Min: Resistance/Reps Notes/Cues        Pulleys       3' flex      T-slide - Rows                 Ext                  IR/ERRed - [id number] - 2x20   Red - 2x20   L Supine cane press and flexion 10\" x 10    Supine cane ER @ 30 deg abd 10\" x 10              Supine single arm press 1# - 2x20    Supine elbow curls 2# - 2x10    Supine protraction 1# - 2x10    Supine flexion  0# - 1x10 Min soreness   Wall slide flex w/ liftoff  Wall slide scaption 10x  10x    Unable to do d/t discomfort as pt reports he has either an infected bug bite or staph infection under his arm   SL ABD 0# - 1 x 10    S/L horiz abd 0# - 1x10            RTB high row 3\" x 30 Cues for avoiding shrug   RTB ext 3\" x 20 Cues for form initially   RTB tricep ext 20x                      Therapeutic Activity (75382) Min:                     NMR re-education (68164)  Min:     Wall pushups    Stability taps at wall         Manual Intervention (06109) Min:      Oscillations for relaxation  STM pec and post cuff  PROM ER, IR, scaption and flexion 10' Improved relaxation   catch at ~ 90               Modalities:  Min     Ice  L shld 10 min  recliner          Other Therapeutic Activities:  Pt was educated on PT POC, Diagnosis, Prognosis, pathomechanics as well as frequency and duration of scheduling future physical therapy appointments. Time was also taken on this day to answer all patient questions and participation in PT. Reviewed appointment policy in detail with patient and patient verbalized understanding. Home Exercise Program: Patient instructed in the following for HEP:            Access Code: [email or web address]  URL: [email or web address]  Date: 07/21/2021  Prepared by: Yong Curling     Patient verbalized/demonstrated understanding and was issued written handout.       Therapeutic Exercise and NMR EXR  [x] (66825) Provided verbal/tactile cueing for activities related to strengthening, flexibility, endurance, ROM  for improvements in scapular, scapulothoracic and UE control with self care, reaching, carrying, lifting, house/yardwork, driving/computer work.    [] (53223) Provided verbal/tactile cueing for activities related to improving balance, coordination, kinesthetic sense, posture, motor skill, proprioception  to assist with  scapular, scapulothoracic and UE control with self care, reaching, carrying, lifting, house/yardwork, driving/computer work. Therapeutic Activities:    [x] (77390 or 73336) Provided verbal/tactile cueing for activities related to improving balance, coordination, kinesthetic sense, posture, motor skill, proprioception and motor activation to allow for proper function of scapular, scapulothoracic and UE control with self care, carrying, lifting, driving/computer work.      Home Exercise Program:    [x] (92403) Reviewed/Progressed HEP activities related to strengthening, flexibility, endurance, ROM of scapular, scapulothoracic and UE control with self care, reaching, carrying, lifting, house/yardwork, driving/computer work  [] (52606) Reviewed/Progressed HEP activities related to improving balance, coordination, kinesthetic sense, posture, motor skill, proprioception of scapular, scapulothoracic and UE control with self care, reaching, carrying, lifting, house/yardwork, driving/computer work      Manual Treatments:  PROM / STM / Oscillations-Mobs:  G-I, II, III, IV (PA's, Inf., Post.)  [x] (59455) Provided manual therapy to mobilize soft tissue/joints of cervical/CT, scapular GHJ and UE for the purpose of modulating pain, promoting relaxation,  increasing ROM, reducing/eliminating soft tissue swelling/inflammation/restriction, improving soft tissue extensibility and allowing for proper ROM for normal function with self care, reaching, carrying, lifting, house/yardwork, driving/computer work    Charges:  Timed Code Treatment Minutes: 40   Total Treatment Minutes: 40       [] EVAL (LOW) 43528 (typically 20 minutes face-to-face)  [] EVAL (MOD) 83939 (typically 30 minutes face-to-face)  [] EVAL (HIGH) 33962 (typically 45 minutes face-to-face)  [] RE-EVAL     [x] WO(61502) x2     [] Dry needle 1 or 2 Muscles (60321)  [] NMR (51250) x     [] Dry needle 3+ Muscles (48120)  [x] Manual (55315) x    [] Ultrasound (70525) x  [] TA (90302) x   [] Mech Traction (34711)  [] ES(attended) (89279)     [] ES (un) (40263):   [] Vasopump (04798)   [] Ionto (77658)   [] Other:    If [facility] Please Indicate Time In/Out  CPT Code Time in Time out        [id number] 2:45 3:15   Man 3:15 3:25                        GOALS:  Patient stated goal: \"Be able to use my arm\"  [x]? Progressing: []? Met: []? Not Met: []? Adjusted     Therapist goals for Patient:   Short Term Goals: To be achieved in: 2 weeks  1. Independent in HEP and progression per patient tolerance, in order to prevent re-injury. [x]? Progressing: []? Met: []? Not Met: []? Adjusted  2. Patient will have a decrease in pain to facilitate improvement in movement, function, and ADLs as indicated by Functional Deficits. [x]? Progressing: []? Met: []? Not Met: []? Adjusted     Long Term Goals: To be achieved in: 8 weeks  1. Disability index score of 42% or less for the UEFS to assist with reaching prior level of function. []? Progressing: [x]? Met: []? Not Met: []? Adjusted  2. Patient will demonstrate increased AROM to 150 deg flexion and abduction, T2 ER, L1 IR to allow for proper joint functioning as indicated by Functional Deficits. []? Progressing: [x]? Met: []? Not Met: []? Adjusted  3. Patient will demonstrate an increase in strength to 4/5 grossly at L shoulder for proper functional mobility as indicated by patients Functional Deficits. [x]? Progressing: []? Met: []? Not Met: []? Adjusted  4. Patient will return to dressing and self care without increased symptoms or restriction. []? Progressing: [x]? Met: []? Not Met: []? Adjusted  5. Patient will return to house/yard work without increased symptoms or restriction .(patient specific functional goal)           [x]? Progressing: []? Met: []? Not Met: []?  Adjusted    ASSESSMENT:  Pt continues to fatigue with strength work. Limited with side lying exercises d/t reported infection under his arm. Treatment/Activity Tolerance:  [x] Patient tolerated treatment well [] Patient limited by fatique  [] Patient limited by pain  [] Patient limited by other medical complications  [] Other:     Overall Progression Towards Functional goals/ Treatment Progress Update:  [x] Patient is progressing as expected towards functional goals listed. [] Progression is slowed due to complexities/Impairments listed. [] Progression has been slowed due to co-morbidities. [] Plan just implemented, too soon to assess goals progression <30days   [] Goals require adjustment due to lack of progress  [] Patient is not progressing as expected and requires additional follow up with physician  [] Other    Prognosis for POC: [x] Good [] Fair  [] Poor    Patient requires continued skilled intervention: [x] Yes  [] No      PLAN:  cont 1-2x/wk for additional 3-4 wks then dc  [] Continue per plan of care [] Alter current plan (see comments)  [] Plan of care initiated [] Hold pending MD visit [] Discharge     Electronically signed by: [place] COLEMAN#[id number]    Note: If patient does not return for scheduled/recommended follow up visits, this note will serve as a discharge from care along with the most recent update on progress.

## 2021-10-18 ENCOUNTER — HOSPITAL ENCOUNTER (OUTPATIENT)
Dept: PHYSICAL THERAPY | Age: 51
Setting detail: THERAPIES SERIES
Discharge: HOME | End: 2021-10-18
Payer: COMMERCIAL

## 2021-10-18 PROCEDURE — 97140 MANUAL THERAPY 1/> REGIONS: CPT

## 2021-10-18 PROCEDURE — 97110 THERAPEUTIC EXERCISES: CPT
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
## 2021-10-21 ENCOUNTER — HOSPITAL ENCOUNTER (OUTPATIENT)
Dept: PHYSICAL THERAPY | Age: 51
Setting detail: THERAPIES SERIES
Discharge: HOME | End: 2021-10-21
Payer: COMMERCIAL
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
## 2021-10-25 ENCOUNTER — HOSPITAL ENCOUNTER (OUTPATIENT)
Dept: PHYSICAL THERAPY | Age: 51
Setting detail: THERAPIES SERIES
Discharge: HOME | End: 2021-10-25
Payer: COMMERCIAL

## 2021-10-25 PROCEDURE — 97110 THERAPEUTIC EXERCISES: CPT

## 2021-10-25 PROCEDURE — 97140 MANUAL THERAPY 1/> REGIONS: CPT
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
## 2021-10-28 ENCOUNTER — HOSPITAL ENCOUNTER (OUTPATIENT)
Dept: PHYSICAL THERAPY | Age: 51
Setting detail: THERAPIES SERIES
Discharge: HOME | End: 2021-10-28
Payer: COMMERCIAL

## 2021-10-28 PROCEDURE — 97140 MANUAL THERAPY 1/> REGIONS: CPT | Performed by: CHIROPRACTOR

## 2021-10-28 PROCEDURE — 97110 THERAPEUTIC EXERCISES: CPT | Performed by: CHIROPRACTOR

## 2021-10-28 NOTE — FLOWSHEET NOTE
[facility], [facility]  [address] [facility], [facility]  Phone: [phone]   Fax:     [phone]        Physical Therapy Treatment Note/ Progress Report:       Date:  10/28/2021    Patient Name:  Yani Lizama    :  1970  MRN: [id number]    Pertinent Medical History:     Medical/Treatment Diagnosis Information:  Diagnosis: Z98.890 (ICD-10-CM) - Other specified postprocedural states  Treatment Diagnosis: L shoulder pain (G89.352)    Insurance/Certification information:  PT Insurance Information: Kris Parsons  Physician Information:  Referring Practitioner: Dr. Robi Calderon of care signed (Y/N):     Date of Patient follow up with Physician:      Progress Report: []  Yes  [x]  No     Date Range for reporting period:  Beginnin2021  Ending:       Progress report due (10 Rx/or 30 days whichever is less): 94    Recertification due (POC duration/ or 90 days whichever is less):10/30    Visit # POC/Insurance Allowable Auth Needed   15 WC- no confirmation received at this time for coverage- per pt  says it will be []Yes    []No     Functional Outcomes Measure:    Date Assessed: 21  Test: UEFS  Score:43/80     Pain level:  L shld 4/10     History of Injury: RTC repair May 25,2021, second surgery following a workman's compensation claim. , initial surgery     SUBJECTIVE: Pt states he was really sore after last visit, has not done exercises since then. States the doctor told him to continue with PT and back off if exercises hurt. 10-18-21   Tolerated last visit ok. Still difficulty sleeping. F/U with MD ~ 6 weeks. 10-25-21 Mild soreness after last Rx, relieved after hot bath/ ice. Reports \"caught up \" with certain movement.    10-28-21 - Pt attributes increased pain to change in the weather      OBJECTIVE:    Observation:   Test measurements: NTT    RESTRICTIONS/PRECAUTIONS: Supraspinatus RTC repair 5/25/21- OK to wean from sling at this time    Exercises/Interventions:   Therapeutic Ex (62307)  Min: Resistance/Reps Notes/Cues        Pulleys       3' flex      T-slide - Rows                 Ext                  IR/ER                Triceps ext                High rowGreen - 2x20 B  Green - 2x20 B  Red - 2x20   L  Red x 20 B  Red 3 \" x 30 BFeels \"popping\" with IR/ER         Supine cane press and flexion 10\" x 10 B    Supine cane ER @ 30 deg abd 10\" x 10 L              Supine single arm press 1# - 2x20 L    Supine elbow curls 2# - 2x10 L    Supine protraction 1# - 2x10 L    Supine flexion  0# - 1x10 L Better today   Wall slide flex w/ liftoff  Wall slide scaption 10x L  10x L    SL ER 1# -2 x 10 L Resumed , briana ok -infection under arm improved with antibiotics   SL ABD 0# - 1 x 10 L     S/L horiz abd 0# - 1x10 L                                        Therapeutic Activity (34453) Min:                     NMR re-education (54548)  Min:     Wall pushups    Stability taps at wall         Manual Intervention (34490) Min:      STM pec and post cuff  PROM ER, IR, scaption and flexion 10' Improved relaxation   catch at ~ 80   Did better with elbow bent             Modalities:  Min     Ice  L shld 10 min  chair          Other Therapeutic Activities:  Pt was educated on PT POC, Diagnosis, Prognosis, pathomechanics as well as frequency and duration of scheduling future physical therapy appointments. Time was also taken on this day to answer all patient questions and participation in PT. Reviewed appointment policy in detail with patient and patient verbalized understanding. Home Exercise Program: Patient instructed in the following for HEP:            Access Code: [email or web address]  URL: [email or web address]  Date: 07/21/2021  Prepared by: oVnda Ortez     Patient verbalized/demonstrated understanding and was issued written handout.       Therapeutic Exercise and NMR EXR  [x] (64756) Provided verbal/tactile cueing for activities related to strengthening, flexibility, endurance, ROM  for improvements in scapular, scapulothoracic and UE control with self care, reaching, carrying, lifting, house/yardwork, driving/computer work.    [] (62272) Provided verbal/tactile cueing for activities related to improving balance, coordination, kinesthetic sense, posture, motor skill, proprioception  to assist with  scapular, scapulothoracic and UE control with self care, reaching, carrying, lifting, house/yardwork, driving/computer work. Therapeutic Activities:    [x] (51441 or 13527) Provided verbal/tactile cueing for activities related to improving balance, coordination, kinesthetic sense, posture, motor skill, proprioception and motor activation to allow for proper function of scapular, scapulothoracic and UE control with self care, carrying, lifting, driving/computer work.      Home Exercise Program:    [x] (74319) Reviewed/Progressed HEP activities related to strengthening, flexibility, endurance, ROM of scapular, scapulothoracic and UE control with self care, reaching, carrying, lifting, house/yardwork, driving/computer work  [] (92447) Reviewed/Progressed HEP activities related to improving balance, coordination, kinesthetic sense, posture, motor skill, proprioception of scapular, scapulothoracic and UE control with self care, reaching, carrying, lifting, house/yardwork, driving/computer work      Manual Treatments:  PROM / STM / Oscillations-Mobs:  G-I, II, III, IV (PA's, Inf., Post.)  [x] (20524) Provided manual therapy to mobilize soft tissue/joints of cervical/CT, scapular GHJ and UE for the purpose of modulating pain, promoting relaxation,  increasing ROM, reducing/eliminating soft tissue swelling/inflammation/restriction, improving soft tissue extensibility and allowing for proper ROM for normal function with self care, reaching, carrying, lifting, house/yardwork, driving/computer work    Charges:  Timed Code Treatment Progressing: [x]? Met: []? Not Met: []? Adjusted  5. Patient will return to house/yard work without increased symptoms or restriction .(patient specific functional goal)           [x]? Progressing: []? Met: []? Not Met: []? Adjusted    ASSESSMENT:  Difficulty with exer today due to increased pain    Treatment/Activity Tolerance:  [x] Patient tolerated treatment well [] Patient limited by fatique  [] Patient limited by pain  [] Patient limited by other medical complications  [x] Other: crepitus noted with certain movements    Overall Progression Towards Functional goals/ Treatment Progress Update:  [x] Patient is progressing as expected towards functional goals listed. [] Progression is slowed due to complexities/Impairments listed. [] Progression has been slowed due to co-morbidities. [] Plan just implemented, too soon to assess goals progression <30days   [] Goals require adjustment due to lack of progress  [] Patient is not progressing as expected and requires additional follow up with physician  [] Other    Prognosis for POC: [x] Good [] Fair  [] Poor    Patient requires continued skilled intervention: [x] Yes  [] No      PLAN:  cont 1-2x/wk for additional 3-4 wks then dc  [] Continue per plan of care [] Alter current plan (see comments)  [] Plan of care initiated [] Hold pending MD visit [] Discharge     Electronically signed by:   Chao [id number]    Note: If patient does not return for scheduled/recommended follow up visits, this note will serve as a discharge from care along with the most recent update on progress.

## 2021-11-01 ENCOUNTER — HOSPITAL ENCOUNTER (OUTPATIENT)
Dept: PHYSICAL THERAPY | Age: 51
Setting detail: THERAPIES SERIES
Discharge: HOME | End: 2021-11-01
Payer: COMMERCIAL

## 2021-11-01 NOTE — FLOWSHEET NOTE
[facility], [facility]  [address] [facility] [place], [facility]  Phone: [phone]   Fax:     [phone]    Physical Therapy  Cancellation/No-show Note  Patient Name:  Ashley Nesbitt  :  1970   Date:  2021  Cancelled visits to date: 3  No-shows to date: 2    Patient status for today's appointment patient:  [x]  Cancelled  []  Rescheduled appointment  []  No-show     Reason given by patient:  []  Patient ill  []  Conflicting appointment  [x]  No transportation    []  Conflict with work  []  No reason given  []  Other:     Comments:      Phone call information:   []  Phone call made today to patient at at number provided:      []  Patient answered, conversation as follows:     []  Patient did not answer, message left as follows:   []  Phone call not made today    Electronically signed by:  Shanika Herron  #[id number]

## 2021-11-04 ENCOUNTER — HOSPITAL ENCOUNTER (OUTPATIENT)
Dept: PHYSICAL THERAPY | Age: 51
Setting detail: THERAPIES SERIES
Discharge: HOME | End: 2021-11-04
Payer: COMMERCIAL

## 2021-11-04 PROCEDURE — 97110 THERAPEUTIC EXERCISES: CPT | Performed by: CHIROPRACTOR

## 2021-11-04 PROCEDURE — 97140 MANUAL THERAPY 1/> REGIONS: CPT | Performed by: CHIROPRACTOR

## 2021-11-04 NOTE — FLOWSHEET NOTE
[facility], [facility]  [address] [facility], [facility]  Phone: [phone]   Fax:     [phone]        Physical Therapy Treatment Note/ Progress Report:       Date:  2021    Patient Name:  Sena Shaikh    :  1970  MRN: [id number]    Pertinent Medical History:     Medical/Treatment Diagnosis Information:  Diagnosis: Z98.890 (ICD-10-CM) - Other specified postprocedural states  Treatment Diagnosis: L shoulder pain (D53.542)    Insurance/Certification information:  PT Insurance Information: Jasvir Dillard  Physician Information:  Referring Practitioner: Dr. Guillory Naval of care signed (Y/N):     Date of Patient follow up with Physician:      Progress Report: []  Yes  [x]  No     Date Range for reporting period:  Beginnin2021  Ending:       Progress report due (10 Rx/or 30 days whichever is less):     Recertification due (POC duration/ or 90 days whichever is less):10/30    Visit # POC/Insurance Allowable Auth Needed   15 WC- no confirmation received at this time for coverage- per pt  says it will be []Yes    []No     Functional Outcomes Measure:    Date Assessed: 21  Test: UEFS  Score:43/80     Pain level:  L shld 10     History of Injury: RTC repair May 25,2021, second surgery following a workman's compensation claim. , initial surgery     SUBJECTIVE: Pt states he was really sore after last visit, has not done exercises since then. States the doctor told him to continue with PT and back off if exercises hurt. 10-18-21   Tolerated last visit ok. Still difficulty sleeping. F/U with MD ~ 6 weeks. 10-25-21 Mild soreness after last Rx, relieved after hot bath/ ice. Reports \"caught up \" with certain movement.    10-28-21 - Pt attributes increased pain to change in the weather  21 - Feeling better      OBJECTIVE:    Observation:   Test measurements: NTT    RESTRICTIONS/PRECAUTIONS: Supraspinatus RTC repair 5/25/21- OK to wean from sling at this time    Exercises/Interventions:   Therapeutic Ex (58646)  Min: Resistance/Reps Notes/Cues             UBE x2 min         Pulleys       3' flex      T-slide - Rows                 Ext                  IR/ER                Triceps ext                High rowGreen - 2x20 B  Green - 2x20 B  Red - 2x20   L  Red x 20 B  Red 3 \" x 30 B         Supine cane press and flexion 10\" x 10 B    Supine cane ER @ 30 deg abd 10\" x 10 L              Supine single arm press 2# - 2x20 L    Supine elbow curls 2# - 2x10 L    Supine protraction 2# - 2x10 L    Supine flexion  0# - 2x10 L Better today   Wall slide flex w/ liftoff  Wall slide scaption 10x L  10x L    SL ER 1# - 2x10 L Resumed , briana ok -infection under arm improved with antibiotics   SL ABD 0# - 2 x 10 L     S/L horiz abd 0# - 2x10 L                                        Therapeutic Activity (38139) Min:                     NMR re-education (20965)  Min:     Wall pushups    Stability taps at wall         Manual Intervention (59908) Min:      STM post cuff  PROM ER, IR, scaption and flexion 10' Improved relaxation   catch at ~ 80   Did better with elbow bent             Modalities:  Min     Ice  L shld 10 min  chair          Other Therapeutic Activities:  Pt was educated on PT POC, Diagnosis, Prognosis, pathomechanics as well as frequency and duration of scheduling future physical therapy appointments. Time was also taken on this day to answer all patient questions and participation in PT. Reviewed appointment policy in detail with patient and patient verbalized understanding. Home Exercise Program: Patient instructed in the following for HEP:            Access Code: [email or web address]  URL: [email or web address]  Date: 07/21/2021  Prepared by: Yuliet Watkins     Patient verbalized/demonstrated understanding and was issued written handout.       Therapeutic Exercise and NMR EXR  [x] (86855) Provided verbal/tactile cueing for activities related to strengthening, flexibility, endurance, ROM  for improvements in scapular, scapulothoracic and UE control with self care, reaching, carrying, lifting, house/yardwork, driving/computer work.    [] (15892) Provided verbal/tactile cueing for activities related to improving balance, coordination, kinesthetic sense, posture, motor skill, proprioception  to assist with  scapular, scapulothoracic and UE control with self care, reaching, carrying, lifting, house/yardwork, driving/computer work. Therapeutic Activities:    [x] (52096 or 84609) Provided verbal/tactile cueing for activities related to improving balance, coordination, kinesthetic sense, posture, motor skill, proprioception and motor activation to allow for proper function of scapular, scapulothoracic and UE control with self care, carrying, lifting, driving/computer work.      Home Exercise Program:    [x] (16462) Reviewed/Progressed HEP activities related to strengthening, flexibility, endurance, ROM of scapular, scapulothoracic and UE control with self care, reaching, carrying, lifting, house/yardwork, driving/computer work  [] (41824) Reviewed/Progressed HEP activities related to improving balance, coordination, kinesthetic sense, posture, motor skill, proprioception of scapular, scapulothoracic and UE control with self care, reaching, carrying, lifting, house/yardwork, driving/computer work      Manual Treatments:  PROM / STM / Oscillations-Mobs:  G-I, II, III, IV (PA's, Inf., Post.)  [x] (10535) Provided manual therapy to mobilize soft tissue/joints of cervical/CT, scapular GHJ and UE for the purpose of modulating pain, promoting relaxation,  increasing ROM, reducing/eliminating soft tissue swelling/inflammation/restriction, improving soft tissue extensibility and allowing for proper ROM for normal function with self care, reaching, carrying, lifting, house/yardwork, driving/computer work    Charges:  Timed Code Treatment Minutes: 45   Total Treatment Minutes: 55       [] EVAL (LOW) 95582 (typically 20 minutes face-to-face)  [] EVAL (MOD) 35657 (typically 30 minutes face-to-face)  [] EVAL (HIGH) 61736 (typically 45 minutes face-to-face)  [] RE-EVAL     [x] BR(33812) x2     [] Dry needle 1 or 2 Muscles (31028)  [] NMR (15438) x     [] Dry needle 3+ Muscles (27722)  [x] Manual (76495) x    [] Ultrasound (19760) x  [] TA (19328) x   [] Mech Traction (15028)  [] ES(attended) (69366)     [] ES (un) (55712):   [] Vasopump (86172)   [] Ionto (86342)   [] Other:    If BW Please Indicate Time In/Out  CPT Code Time in Time out        [id number]-[id number] 1:15 1:45   Man 1:45 2:00                        GOALS:  Patient stated goal: \"Be able to use my arm\"  [x]? Progressing: []? Met: []? Not Met: []? Adjusted     Therapist goals for Patient:   Short Term Goals: To be achieved in: 2 weeks  1. Independent in HEP and progression per patient tolerance, in order to prevent re-injury. [x]? Progressing: []? Met: []? Not Met: []? Adjusted  2. Patient will have a decrease in pain to facilitate improvement in movement, function, and ADLs as indicated by Functional Deficits. [x]? Progressing: []? Met: []? Not Met: []? Adjusted     Long Term Goals: To be achieved in: 8 weeks  1. Disability index score of 42% or less for the UEFS to assist with reaching prior level of function. []? Progressing: [x]? Met: []? Not Met: []? Adjusted  2. Patient will demonstrate increased AROM to 150 deg flexion and abduction, T2 ER, L1 IR to allow for proper joint functioning as indicated by Functional Deficits. []? Progressing: [x]? Met: []? Not Met: []? Adjusted  3. Patient will demonstrate an increase in strength to 4/5 grossly at L shoulder for proper functional mobility as indicated by patients Functional Deficits. [x]? Progressing: []? Met: []? Not Met: []? Adjusted  4.  Patient will return to dressing and self care without increased symptoms or restriction. []? Progressing: [x]? Met: []? Not Met: []? Adjusted  5. Patient will return to house/yard work without increased symptoms or restriction .(patient specific functional goal)           [x]? Progressing: []? Met: []? Not Met: []? Adjusted    ASSESSMENT:  Difficulty with exer today due to increased pain    Treatment/Activity Tolerance:  [x] Patient tolerated treatment well [] Patient limited by fatique  [] Patient limited by pain  [] Patient limited by other medical complications  [x] Other: crepitus noted with certain movements    Overall Progression Towards Functional goals/ Treatment Progress Update:  [x] Patient is progressing as expected towards functional goals listed. [] Progression is slowed due to complexities/Impairments listed. [] Progression has been slowed due to co-morbidities. [] Plan just implemented, too soon to assess goals progression <30days   [] Goals require adjustment due to lack of progress  [] Patient is not progressing as expected and requires additional follow up with physician  [] Other    Prognosis for POC: [x] Good [] Fair  [] Poor    Patient requires continued skilled intervention: [x] Yes  [] No      PLAN:  cont 1-2x/wk for additional 3-4 wks then dc  [] Continue per plan of care [] Alter current plan (see comments)  [] Plan of care initiated [] Hold pending MD visit [] Discharge     Electronically signed by:   Sandrine Aleman [id number]    Note: If patient does not return for scheduled/recommended follow up visits, this note will serve as a discharge from care along with the most recent update on progress.

## 2021-11-08 ENCOUNTER — HOSPITAL ENCOUNTER (OUTPATIENT)
Dept: PHYSICAL THERAPY | Age: 51
Setting detail: THERAPIES SERIES
Discharge: HOME | End: 2021-11-08
Payer: COMMERCIAL

## 2021-11-08 NOTE — FLOWSHEET NOTE
[facility], [facility]  [address] [facility] [place], [facility] [place]  Phone: [phone]   Fax:     [phone]    Physical Therapy  Cancellation/No-show Note  Patient Name:  Kali Nelson  :  1970   Date:  2021  Cancelled visits to date: 2  No-shows to date: 3    Patient status for today's appointment patient:  []  Cancelled  []  Rescheduled appointment  [x]  No-show     Reason given by patient:  []  Patient ill  []  Conflicting appointment  []  No transportation    []  Conflict with work  []  No reason given  []  Other:     Comments:    Phone call information:   [x]  Phone call made today to patient at at number provided:      [x]  Patient answered, conversation as follows:  Called re NS,  Pt reports he forgot apt. Notified of next apt date/ time, pt reports he will attend.       []  Patient did not answer, message left as follows:   []  Phone call not made today    Electronically signed by:  Mari Hall, PTA  [id number]

## 2021-11-11 ENCOUNTER — HOSPITAL ENCOUNTER (OUTPATIENT)
Dept: PHYSICAL THERAPY | Age: 51
Setting detail: THERAPIES SERIES
Discharge: HOME | End: 2021-11-11
Payer: COMMERCIAL

## 2021-11-11 PROCEDURE — 97110 THERAPEUTIC EXERCISES: CPT | Performed by: CHIROPRACTOR

## 2021-11-11 PROCEDURE — 97140 MANUAL THERAPY 1/> REGIONS: CPT | Performed by: CHIROPRACTOR

## 2021-11-11 NOTE — FLOWSHEET NOTE
[facility], [facility]  [address] [facility] [place], [facility]  Phone: [phone]   Fax:     [phone]        Physical Therapy Treatment Note/ Progress Report:       Date:  2021    Patient Name:  Jacqueline Marin    :  1970  MRN: [id number]    Pertinent Medical History:     Medical/Treatment Diagnosis Information:  Diagnosis: Z98.890 (ICD-10-CM) - Other specified postprocedural states  Treatment Diagnosis: L shoulder pain (W06.191)    Insurance/Certification information:  PT Insurance Information: Neil Pickens  Physician Information:  Referring Practitioner: Dr. Verna Thompson of care signed (Y/N):     Date of Patient follow up with Physician:      Progress Report: []  Yes  [x]  No     Date Range for reporting period:  Beginnin2021  Ending:       Progress report due (10 Rx/or 30 days whichever is less): 3/79/55    Recertification due (POC duration/ or 90 days whichever is less):10/30    Visit # POC/Insurance Allowable Auth Needed   12 WC- no confirmation received at this time for coverage- per pt  says it will be []Yes    []No     Functional Outcomes Measure:    Date Assessed: 21  Test: UEFS  Score:43/80     Pain level:  L shld 2/10     History of Injury: RTC repair May 25,2021, second surgery following a workman's compensation claim. , initial surgery     SUBJECTIVE: Pt states he was really sore after last visit, has not done exercises since then. States the doctor told him to continue with PT and back off if exercises hurt. 10-18-21   Tolerated last visit ok. Still difficulty sleeping. F/U with MD ~ 6 weeks. 10-25-21 Mild soreness after last Rx, relieved after hot bath/ ice. Reports \"caught up \" with certain movement.    10-28-21 - Pt attributes increased pain to change in the weather  21 - Feeling better      OBJECTIVE:    Observation:   Test measurements: NTT    RESTRICTIONS/PRECAUTIONS: Supraspinatus RTC repair 5/25/21- OK to wean from sling at this time    Exercises/Interventions:   Therapeutic Ex (11530)  Min: Resistance/Reps Notes/Cues             UBE x2 min         Pulleys       3' flex      T-slide - Rows                 Ext                  IR/ER                Triceps ext                High rowGreen - 2x20 B  Green - 2x20 B  Red - 2x20   L  Red x 20 B  Red 3 \" x 30 B         Supine cane press and flexion 10\" x 10 B    Supine cane ER @ 30 deg abd 10\" x 10 L              Supine single arm press 2# - 2x20 L    Supine elbow curls 2# - 2x10 L    Supine protraction 2# - 2x10 L    Supine flexion  0# - 2x10 L Better today   Wall slide flex w/ liftoff  Wall slide scaption 10x L  10x L    SL ER 1# - 2x10 L Resumed , briana ok -infection under arm improved with antibiotics   SL ABD 1# - 2 x 10 L     S/L horiz abd 0# - 2x10 L                                        Therapeutic Activity (22768) Min:                     NMR re-education (96534)  Min:     Wall pushups    Stability taps at wall         Manual Intervention (70500) Min:      STM post cuff  PROM ER, IR, scaption and flexion 10' Improved relaxation   catch at ~ 80   Did better with elbow bent             Modalities:  Min     Ice  L shld 10 min  chair          Other Therapeutic Activities:  Pt was educated on PT POC, Diagnosis, Prognosis, pathomechanics as well as frequency and duration of scheduling future physical therapy appointments. Time was also taken on this day to answer all patient questions and participation in PT. Reviewed appointment policy in detail with patient and patient verbalized understanding. Home Exercise Program: Patient instructed in the following for HEP:            Access Code: [email or web address]  URL: [email or web address]  Date: 07/21/2021  Prepared by: Cara Isaac     Patient verbalized/demonstrated understanding and was issued written handout.       Therapeutic Exercise and NMR EXR  [x] (41405) Provided verbal/tactile cueing for activities related to strengthening, flexibility, endurance, ROM  for improvements in scapular, scapulothoracic and UE control with self care, reaching, carrying, lifting, house/yardwork, driving/computer work.    [] (78544) Provided verbal/tactile cueing for activities related to improving balance, coordination, kinesthetic sense, posture, motor skill, proprioception  to assist with  scapular, scapulothoracic and UE control with self care, reaching, carrying, lifting, house/yardwork, driving/computer work. Therapeutic Activities:    [x] (80292 or 09496) Provided verbal/tactile cueing for activities related to improving balance, coordination, kinesthetic sense, posture, motor skill, proprioception and motor activation to allow for proper function of scapular, scapulothoracic and UE control with self care, carrying, lifting, driving/computer work.      Home Exercise Program:    [x] (30447) Reviewed/Progressed HEP activities related to strengthening, flexibility, endurance, ROM of scapular, scapulothoracic and UE control with self care, reaching, carrying, lifting, house/yardwork, driving/computer work  [] (57125) Reviewed/Progressed HEP activities related to improving balance, coordination, kinesthetic sense, posture, motor skill, proprioception of scapular, scapulothoracic and UE control with self care, reaching, carrying, lifting, house/yardwork, driving/computer work      Manual Treatments:  PROM / STM / Oscillations-Mobs:  G-I, II, III, IV (PA's, Inf., Post.)  [x] (39396) Provided manual therapy to mobilize soft tissue/joints of cervical/CT, scapular GHJ and UE for the purpose of modulating pain, promoting relaxation,  increasing ROM, reducing/eliminating soft tissue swelling/inflammation/restriction, improving soft tissue extensibility and allowing for proper ROM for normal function with self care, reaching, carrying, lifting, house/yardwork, driving/computer work    Charges:  Timed Code Treatment Minutes: 45   Total Treatment Minutes: 55       [] EVAL (LOW) 95706 (typically 20 minutes face-to-face)  [] EVAL (MOD) 06944 (typically 30 minutes face-to-face)  [] EVAL (HIGH) 71083 (typically 45 minutes face-to-face)  [] RE-EVAL     [x] PP(36554) x2     [] Dry needle 1 or 2 Muscles (27121)  [] NMR (16532) x     [] Dry needle 3+ Muscles (85650)  [x] Manual (15812) x    [] Ultrasound (73277) x  [] TA (17016) x   [] Mech Traction (83792)  [] ES(attended) (51323)     [] ES (un) (32749):   [] Vasopump (43652)   [] Ionto (50681)   [] Other:    If [facility] Please Indicate Time In/Out  CPT Code Time in Time out        [id number]-[id number] 2:45 3:15   Man 3:15 3:25                        GOALS:  Patient stated goal: \"Be able to use my arm\"  [x]? Progressing: []? Met: []? Not Met: []? Adjusted     Therapist goals for Patient:   Short Term Goals: To be achieved in: 2 weeks  1. Independent in HEP and progression per patient tolerance, in order to prevent re-injury. [x]? Progressing: []? Met: []? Not Met: []? Adjusted  2. Patient will have a decrease in pain to facilitate improvement in movement, function, and ADLs as indicated by Functional Deficits. [x]? Progressing: []? Met: []? Not Met: []? Adjusted     Long Term Goals: To be achieved in: 8 weeks  1. Disability index score of 42% or less for the UEFS to assist with reaching prior level of function. []? Progressing: [x]? Met: []? Not Met: []? Adjusted  2. Patient will demonstrate increased AROM to 150 deg flexion and abduction, T2 ER, L1 IR to allow for proper joint functioning as indicated by Functional Deficits. []? Progressing: [x]? Met: []? Not Met: []? Adjusted  3. Patient will demonstrate an increase in strength to 4/5 grossly at L shoulder for proper functional mobility as indicated by patients Functional Deficits. [x]? Progressing: []? Met: []? Not Met: []? Adjusted  4.  Patient will return to dressing and self care without increased symptoms or restriction. []? Progressing: [x]? Met: []? Not Met: []? Adjusted  5. Patient will return to house/yard work without increased symptoms or restriction .(patient specific functional goal)           [x]? Progressing: []? Met: []? Not Met: []? Adjusted    ASSESSMENT:  Difficulty with exer today due to increased pain    Treatment/Activity Tolerance:  [x] Patient tolerated treatment well [] Patient limited by fatique  [] Patient limited by pain  [] Patient limited by other medical complications  [x] Other: crepitus noted with certain movements    Overall Progression Towards Functional goals/ Treatment Progress Update:  [x] Patient is progressing as expected towards functional goals listed. [] Progression is slowed due to complexities/Impairments listed. [] Progression has been slowed due to co-morbidities. [] Plan just implemented, too soon to assess goals progression <30days   [] Goals require adjustment due to lack of progress  [] Patient is not progressing as expected and requires additional follow up with physician  [] Other    Prognosis for POC: [x] Good [] Fair  [] Poor    Patient requires continued skilled intervention: [x] Yes  [] No      PLAN:  cont 1-2x/wk for additional 3-4 wks then dc  [] Continue per plan of care [] Alter current plan (see comments)  [] Plan of care initiated [] Hold pending MD visit [] Discharge     Electronically signed by:   Vane Brewster [id number]    Note: If patient does not return for scheduled/recommended follow up visits, this note will serve as a discharge from care along with the most recent update on progress.

## 2021-11-17 ENCOUNTER — HOSPITAL ENCOUNTER (OUTPATIENT)
Dept: PHYSICAL THERAPY | Age: 51
Setting detail: THERAPIES SERIES
End: 2021-11-17
Payer: COMMERCIAL

## 2021-11-23 ENCOUNTER — APPOINTMENT (OUTPATIENT)
Dept: PHYSICAL THERAPY | Age: 51
End: 2021-11-23
Payer: COMMERCIAL

## 2021-11-30 ENCOUNTER — HOSPITAL ENCOUNTER (OUTPATIENT)
Dept: PHYSICAL THERAPY | Age: 51
Setting detail: THERAPIES SERIES
Discharge: HOME | End: 2021-11-30
Payer: COMMERCIAL

## 2021-11-30 NOTE — FLOWSHEET NOTE
[facility], [facility]  [address] [facility], [facility]  Phone: [phone]   Fax:     [phone]    Physical Therapy  Cancellation/No-show Note  Patient Name:  Brennon Cohen  :  1970   Date:  2021  Cancelled visits to date: 2  No-shows to date: 4    Patient status for today's appointment patient:  []  Cancelled  []  Rescheduled appointment  [x]  No-show     Reason given by patient:  [x]  Patient ill  []  Conflicting appointment  []  No transportation    []  Conflict with work  []  No reason given  [x]  Other:     Comments: Has Covid.  Taken off of schedule   Phone call information:   [x]  Phone call made today to patient at at number provided:      []  Patient answered, conversation as follows:     []  Patient did not answer, message left as follows:   []  Phone call not made today    Electronically signed by:  Tim Lundberg  [id number]

## 2021-12-03 ENCOUNTER — APPOINTMENT (OUTPATIENT)
Dept: CT IMAGING | Age: 51
DRG: 113 | End: 2021-12-03
Payer: COMMERCIAL

## 2021-12-03 ENCOUNTER — HOSPITAL ENCOUNTER (INPATIENT)
Age: 51
LOS: 2 days | Discharge: HOME | DRG: 113 | End: 2021-12-05
Attending: EMERGENCY MEDICINE | Admitting: INTERNAL MEDICINE
Payer: COMMERCIAL

## 2021-12-03 ENCOUNTER — APPOINTMENT (OUTPATIENT)
Dept: GENERAL RADIOLOGY | Age: 51
DRG: 113 | End: 2021-12-03
Payer: COMMERCIAL

## 2021-12-03 DIAGNOSIS — H70.92 MASTOIDITIS OF LEFT SIDE: ICD-10-CM

## 2021-12-03 DIAGNOSIS — R41.0 ACUTE CONFUSION: Primary | ICD-10-CM

## 2021-12-03 DIAGNOSIS — R51.9 SEVERE HEADACHE: ICD-10-CM

## 2021-12-03 DIAGNOSIS — H91.92 ACUTE HEARING LOSS, LEFT: ICD-10-CM

## 2021-12-03 PROBLEM — R41.82 AMS (ALTERED MENTAL STATUS): Status: ACTIVE | Noted: 2021-12-03

## 2021-12-03 LAB
A/G RATIO: 1.3 (ref 1.1–2.2)
ALBUMIN SERPL-MCNC: 4.3 G/DL (ref 3.4–5)
ALP BLD-CCNC: 113 U/L (ref 40–129)
ALT SERPL-CCNC: 34 U/L (ref 10–40)
AMPHETAMINE SCREEN, URINE: ABNORMAL
ANION GAP SERPL CALCULATED.3IONS-SCNC: 14 MMOL/L (ref 3–16)
AST SERPL-CCNC: 43 U/L (ref 15–37)
BARBITURATE SCREEN URINE: ABNORMAL
BASOPHILS ABSOLUTE: 0.1 K/UL (ref 0–0.2)
BASOPHILS RELATIVE PERCENT: 1.1 %
BENZODIAZEPINE SCREEN, URINE: ABNORMAL
BILIRUB SERPL-MCNC: 1.2 MG/DL (ref 0–1)
BILIRUBIN URINE: ABNORMAL
BLOOD, URINE: NEGATIVE
BUN BLDV-MCNC: 14 MG/DL (ref 7–20)
CALCIUM SERPL-MCNC: 9.6 MG/DL (ref 8.3–10.6)
CANNABINOID SCREEN URINE: POSITIVE
CHLORIDE BLD-SCNC: 101 MMOL/L (ref 99–110)
CLARITY: CLEAR
CO2: 23 MMOL/L (ref 21–32)
COCAINE METABOLITE SCREEN URINE: ABNORMAL
COLOR: ABNORMAL
CREAT SERPL-MCNC: 1 MG/DL (ref 0.9–1.3)
EKG ATRIAL RATE: 66 BPM
EKG DIAGNOSIS: NORMAL
EKG P AXIS: 36 DEGREES
EKG P-R INTERVAL: 130 MS
EKG Q-T INTERVAL: 420 MS
EKG QRS DURATION: 102 MS
EKG QTC CALCULATION (BAZETT): 440 MS
EKG R AXIS: 0 DEGREES
EKG T AXIS: -16 DEGREES
EKG VENTRICULAR RATE: 66 BPM
EOSINOPHILS ABSOLUTE: 0.1 K/UL (ref 0–0.6)
EOSINOPHILS RELATIVE PERCENT: 1.9 %
EPITHELIAL CELLS, UA: 1 /HPF (ref 0–5)
ETHANOL: NORMAL MG/DL (ref 0–0.08)
GFR AFRICAN AMERICAN: >60
GFR NON-AFRICAN AMERICAN: >60
GLUCOSE BLD-MCNC: 107 MG/DL (ref 70–99)
GLUCOSE URINE: NEGATIVE MG/DL
HCT VFR BLD CALC: 53.8 % (ref 40.5–52.5)
HEMOGLOBIN: 18.2 G/DL (ref 13.5–17.5)
HYALINE CASTS: 5 /LPF (ref 0–8)
KETONES, URINE: 40 MG/DL
LEUKOCYTE ESTERASE, URINE: NEGATIVE
LYMPHOCYTES ABSOLUTE: 2.2 K/UL (ref 1–5.1)
LYMPHOCYTES RELATIVE PERCENT: 29.3 %
Lab: ABNORMAL
MCH RBC QN AUTO: 28.9 PG (ref 26–34)
MCHC RBC AUTO-ENTMCNC: 33.9 G/DL (ref 31–36)
MCV RBC AUTO: 85.3 FL (ref 80–100)
METHADONE SCREEN, URINE: ABNORMAL
MICROSCOPIC EXAMINATION: YES
MONOCYTES ABSOLUTE: 1 K/UL (ref 0–1.3)
MONOCYTES RELATIVE PERCENT: 13.4 %
NEUTROPHILS ABSOLUTE: 4.1 K/UL (ref 1.7–7.7)
NEUTROPHILS RELATIVE PERCENT: 54.3 %
NITRITE, URINE: NEGATIVE
OPIATE SCREEN URINE: POSITIVE
OXYCODONE URINE: ABNORMAL
PDW BLD-RTO: 13 % (ref 12.4–15.4)
PH UA: 6
PH UA: 6.5 (ref 5–8)
PHENCYCLIDINE SCREEN URINE: ABNORMAL
PLATELET # BLD: 354 K/UL (ref 135–450)
PMV BLD AUTO: 9.1 FL (ref 5–10.5)
POTASSIUM REFLEX MAGNESIUM: 4.2 MMOL/L (ref 3.5–5.1)
PROPOXYPHENE SCREEN: ABNORMAL
PROTEIN UA: 30 MG/DL
RBC # BLD: 6.31 M/UL (ref 4.2–5.9)
RBC UA: 2 /HPF (ref 0–4)
SODIUM BLD-SCNC: 138 MMOL/L (ref 136–145)
SPECIFIC GRAVITY UA: >1.03 (ref 1–1.03)
TOTAL PROTEIN: 7.7 G/DL (ref 6.4–8.2)
URINE REFLEX TO CULTURE: ABNORMAL
URINE TYPE: ABNORMAL
UROBILINOGEN, URINE: 1 E.U./DL
WBC # BLD: 7.6 K/UL (ref 4–11)
WBC UA: 1 /HPF (ref 0–5)

## 2021-12-03 PROCEDURE — 6360000004 HC RX CONTRAST MEDICATION: Performed by: EMERGENCY MEDICINE

## 2021-12-03 PROCEDURE — 96374 THER/PROPH/DIAG INJ IV PUSH: CPT

## 2021-12-03 PROCEDURE — 96365 THER/PROPH/DIAG IV INF INIT: CPT

## 2021-12-03 PROCEDURE — 36415 COLL VENOUS BLD VENIPUNCTURE: CPT

## 2021-12-03 PROCEDURE — 70496 CT ANGIOGRAPHY HEAD: CPT

## 2021-12-03 PROCEDURE — 81001 URINALYSIS AUTO W/SCOPE: CPT

## 2021-12-03 PROCEDURE — 82077 ASSAY SPEC XCP UR&BREATH IA: CPT

## 2021-12-03 PROCEDURE — G0378 HOSPITAL OBSERVATION PER HR: HCPCS

## 2021-12-03 PROCEDURE — 2060000000 HC ICU INTERMEDIATE R&B

## 2021-12-03 PROCEDURE — 6360000002 HC RX W HCPCS: Performed by: PHYSICIAN ASSISTANT

## 2021-12-03 PROCEDURE — 93005 ELECTROCARDIOGRAM TRACING: CPT | Performed by: PHYSICIAN ASSISTANT

## 2021-12-03 PROCEDURE — 80307 DRUG TEST PRSMV CHEM ANLYZR: CPT

## 2021-12-03 PROCEDURE — 80053 COMPREHEN METABOLIC PANEL: CPT

## 2021-12-03 PROCEDURE — 2580000003 HC RX 258: Performed by: PHYSICIAN ASSISTANT

## 2021-12-03 PROCEDURE — 70450 CT HEAD/BRAIN W/O DYE: CPT

## 2021-12-03 PROCEDURE — 93010 ELECTROCARDIOGRAM REPORT: CPT | Performed by: INTERNAL MEDICINE

## 2021-12-03 PROCEDURE — 2580000003 HC RX 258: Performed by: INTERNAL MEDICINE

## 2021-12-03 PROCEDURE — 71045 X-RAY EXAM CHEST 1 VIEW: CPT

## 2021-12-03 PROCEDURE — 6360000002 HC RX W HCPCS: Performed by: EMERGENCY MEDICINE

## 2021-12-03 PROCEDURE — 99282 EMERGENCY DEPT VISIT SF MDM: CPT

## 2021-12-03 PROCEDURE — 6370000000 HC RX 637 (ALT 250 FOR IP): Performed by: PHYSICIAN ASSISTANT

## 2021-12-03 PROCEDURE — 85025 COMPLETE CBC W/AUTO DIFF WBC: CPT

## 2021-12-03 PROCEDURE — 6370000000 HC RX 637 (ALT 250 FOR IP): Performed by: INTERNAL MEDICINE

## 2021-12-03 PROCEDURE — 87040 BLOOD CULTURE FOR BACTERIA: CPT

## 2021-12-03 PROCEDURE — 96375 TX/PRO/DX INJ NEW DRUG ADDON: CPT

## 2021-12-03 PROCEDURE — 96367 TX/PROPH/DG ADDL SEQ IV INF: CPT

## 2021-12-03 RX ORDER — ACETAMINOPHEN 500 MG
500 TABLET ORAL EVERY 6 HOURS PRN
COMMUNITY
Start: 2021-07-09

## 2021-12-03 RX ORDER — SODIUM CHLORIDE 0.9 % (FLUSH) 0.9 %
10 SYRINGE (ML) INJECTION PRN
Status: DISCONTINUED | OUTPATIENT
Start: 2021-12-03 | End: 2021-12-05 | Stop reason: HOSPADM

## 2021-12-03 RX ORDER — METOPROLOL TARTRATE 50 MG/1
100 TABLET, FILM COATED ORAL 2 TIMES DAILY
Status: DISCONTINUED | OUTPATIENT
Start: 2021-12-03 | End: 2021-12-05 | Stop reason: HOSPADM

## 2021-12-03 RX ORDER — MORPHINE SULFATE 4 MG/ML
4 INJECTION, SOLUTION INTRAMUSCULAR; INTRAVENOUS ONCE
Status: COMPLETED | OUTPATIENT
Start: 2021-12-03 | End: 2021-12-03

## 2021-12-03 RX ORDER — POTASSIUM CHLORIDE 7.45 MG/ML
10 INJECTION INTRAVENOUS PRN
Status: DISCONTINUED | OUTPATIENT
Start: 2021-12-03 | End: 2021-12-05 | Stop reason: HOSPADM

## 2021-12-03 RX ORDER — ACETAMINOPHEN 325 MG/1
650 TABLET ORAL EVERY 6 HOURS PRN
Status: DISCONTINUED | OUTPATIENT
Start: 2021-12-03 | End: 2021-12-04

## 2021-12-03 RX ORDER — ONDANSETRON 4 MG/1
4 TABLET, ORALLY DISINTEGRATING ORAL EVERY 8 HOURS PRN
Status: DISCONTINUED | OUTPATIENT
Start: 2021-12-03 | End: 2021-12-05 | Stop reason: HOSPADM

## 2021-12-03 RX ORDER — HALOPERIDOL 5 MG/ML
5 INJECTION INTRAMUSCULAR ONCE
Status: COMPLETED | OUTPATIENT
Start: 2021-12-03 | End: 2021-12-03

## 2021-12-03 RX ORDER — DIPHENHYDRAMINE HYDROCHLORIDE 50 MG/ML
25 INJECTION INTRAMUSCULAR; INTRAVENOUS ONCE
Status: COMPLETED | OUTPATIENT
Start: 2021-12-03 | End: 2021-12-03

## 2021-12-03 RX ORDER — DEXAMETHASONE SODIUM PHOSPHATE 10 MG/ML
10 INJECTION INTRAMUSCULAR; INTRAVENOUS ONCE
Status: COMPLETED | OUTPATIENT
Start: 2021-12-03 | End: 2021-12-03

## 2021-12-03 RX ORDER — 0.9 % SODIUM CHLORIDE 0.9 %
1000 INTRAVENOUS SOLUTION INTRAVENOUS ONCE
Status: COMPLETED | OUTPATIENT
Start: 2021-12-03 | End: 2021-12-03

## 2021-12-03 RX ORDER — ONDANSETRON 2 MG/ML
4 INJECTION INTRAMUSCULAR; INTRAVENOUS ONCE
Status: COMPLETED | OUTPATIENT
Start: 2021-12-03 | End: 2021-12-03

## 2021-12-03 RX ORDER — ONDANSETRON 2 MG/ML
4 INJECTION INTRAMUSCULAR; INTRAVENOUS EVERY 6 HOURS PRN
Status: DISCONTINUED | OUTPATIENT
Start: 2021-12-03 | End: 2021-12-05 | Stop reason: HOSPADM

## 2021-12-03 RX ORDER — LEVOTHYROXINE SODIUM 0.12 MG/1
125 TABLET ORAL DAILY
Status: DISCONTINUED | OUTPATIENT
Start: 2021-12-04 | End: 2021-12-05 | Stop reason: HOSPADM

## 2021-12-03 RX ORDER — LEVOTHYROXINE SODIUM 0.12 MG/1
125 TABLET ORAL DAILY
COMMUNITY
Start: 2021-10-22 | End: 2021-12-30

## 2021-12-03 RX ORDER — SODIUM CHLORIDE 0.9 % (FLUSH) 0.9 %
10 SYRINGE (ML) INJECTION EVERY 12 HOURS SCHEDULED
Status: DISCONTINUED | OUTPATIENT
Start: 2021-12-03 | End: 2021-12-05 | Stop reason: HOSPADM

## 2021-12-03 RX ORDER — MAGNESIUM SULFATE IN WATER 40 MG/ML
2000 INJECTION, SOLUTION INTRAVENOUS PRN
Status: DISCONTINUED | OUTPATIENT
Start: 2021-12-03 | End: 2021-12-05 | Stop reason: HOSPADM

## 2021-12-03 RX ORDER — METHOCARBAMOL 500 MG/1
500 TABLET, FILM COATED ORAL ONCE
Status: COMPLETED | OUTPATIENT
Start: 2021-12-03 | End: 2021-12-03

## 2021-12-03 RX ORDER — SODIUM CHLORIDE 9 MG/ML
25 INJECTION, SOLUTION INTRAVENOUS PRN
Status: DISCONTINUED | OUTPATIENT
Start: 2021-12-03 | End: 2021-12-05 | Stop reason: HOSPADM

## 2021-12-03 RX ORDER — ACETAMINOPHEN 650 MG/1
650 SUPPOSITORY RECTAL EVERY 6 HOURS PRN
Status: DISCONTINUED | OUTPATIENT
Start: 2021-12-03 | End: 2021-12-04

## 2021-12-03 RX ADMIN — IOPAMIDOL 75 ML: 755 INJECTION, SOLUTION INTRAVENOUS at 16:15

## 2021-12-03 RX ADMIN — DIPHENHYDRAMINE HYDROCHLORIDE 25 MG: 50 INJECTION, SOLUTION INTRAMUSCULAR; INTRAVENOUS at 16:38

## 2021-12-03 RX ADMIN — SODIUM CHLORIDE 25 ML: 9 INJECTION, SOLUTION INTRAVENOUS at 21:50

## 2021-12-03 RX ADMIN — METHOCARBAMOL 500 MG: 500 TABLET ORAL at 15:19

## 2021-12-03 RX ADMIN — MORPHINE SULFATE 4 MG: 4 INJECTION, SOLUTION INTRAMUSCULAR; INTRAVENOUS at 15:25

## 2021-12-03 RX ADMIN — METOPROLOL TARTRATE 100 MG: 50 TABLET, FILM COATED ORAL at 21:47

## 2021-12-03 RX ADMIN — SODIUM CHLORIDE 1000 ML: 9 INJECTION, SOLUTION INTRAVENOUS at 15:20

## 2021-12-03 RX ADMIN — ONDANSETRON 4 MG: 2 INJECTION INTRAMUSCULAR; INTRAVENOUS at 15:24

## 2021-12-03 RX ADMIN — DEXAMETHASONE SODIUM PHOSPHATE 10 MG: 10 INJECTION INTRAMUSCULAR; INTRAVENOUS at 18:50

## 2021-12-03 RX ADMIN — HALOPERIDOL LACTATE 5 MG: 5 INJECTION, SOLUTION INTRAMUSCULAR at 16:39

## 2021-12-03 RX ADMIN — CEFTRIAXONE 2000 MG: 2 INJECTION, POWDER, FOR SOLUTION INTRAMUSCULAR; INTRAVENOUS at 19:10

## 2021-12-03 ASSESSMENT — PAIN SCALES - GENERAL
PAINLEVEL_OUTOF10: 3
PAINLEVEL_OUTOF10: 10
PAINLEVEL_OUTOF10: 3
PAINLEVEL_OUTOF10: 0
PAINLEVEL_OUTOF10: 10
PAINLEVEL_OUTOF10: 0

## 2021-12-03 ASSESSMENT — PAIN DESCRIPTION - ORIENTATION: ORIENTATION: LEFT

## 2021-12-03 ASSESSMENT — PAIN DESCRIPTION - FREQUENCY: FREQUENCY: CONTINUOUS

## 2021-12-03 ASSESSMENT — PAIN DESCRIPTION - ONSET: ONSET: ON-GOING

## 2021-12-03 ASSESSMENT — PAIN DESCRIPTION - PAIN TYPE: TYPE: ACUTE PAIN

## 2021-12-03 ASSESSMENT — PAIN DESCRIPTION - PROGRESSION: CLINICAL_PROGRESSION: NOT CHANGED

## 2021-12-03 ASSESSMENT — PAIN - FUNCTIONAL ASSESSMENT: PAIN_FUNCTIONAL_ASSESSMENT: PREVENTS OR INTERFERES SOME ACTIVE ACTIVITIES AND ADLS

## 2021-12-03 ASSESSMENT — PAIN DESCRIPTION - LOCATION: LOCATION: HEAD

## 2021-12-03 ASSESSMENT — PAIN DESCRIPTION - DESCRIPTORS: DESCRIPTORS: HEADACHE

## 2021-12-03 NOTE — ED PROVIDER NOTES
[address] [facility]        Pt Name: Kristina Amaral  MRN: [id number]  [place] 1970  Date of evaluation: 12/3/2021  Provider: Robb Willson PA-C  PCP: Hilaria Nobles MD  Note Started: 3:40 PM EST       I have seen and evaluated this patient with my supervising physician Brtiany London DO. Triage CHIEF COMPLAINT       Chief Complaint   Patient presents with    Headache     states head hurts went to bed after that.  Hearing Problem     states lost hearing yesterday started mid day she cannot give a time     Numbness     left side of face numbness started yesterday. brought in via wife she states that she doesnt know any further information d/t him not being able to hear her. she believes within the past hour. HISTORY OF PRESENT ILLNESS   (Location/Symptom, Timing/Onset, Context/Setting, Quality, Duration, Modifying Factors, Severity)  Note limiting factors. Chief Complaint: Left-sided head and neck pain decreased hearing altered mental status since yesterday    Kristina Amaral is a 54 y.o. male who presents along with his significant other and she gives the majority of the history. She states that he was complaining of some left-sided headache yesterday. That was a little bit abnormal but as he got into the evening then he started complaining of not being able to hear well. She felt like it might have been from his left ear. He did go to bed. Then when he woke this morning he continues to have the symptoms but seems a bit confused, and not really himself. She states that he has not had any alcohol or any drugs and he has not had any recent Covid exposure and has his Covid shots. She is wondering what could be causing all the issues and brought him in to be seen. Reportedly patient is hearing a little bit better from his right ear than his left ear but typically has no hearing issues.   Patient's wife states that he does have a history of shoulder issues but no acute shoulder pain today according to patient. Patient answers questions minimally, frequently deferring to his wife and allowing her to give the history. Patient's wife states that he does not really have any psychiatric ongoing issues other than some depression. He is a smoker. No diagnosis of heart issues or lung issues or any previous stroke or similar issues before according to patient's wife. Nursing Notes were all reviewed and agreed with or any disagreements were addressed in the HPI. REVIEW OF SYSTEMS    (2-9 systems for level 4, 10 or more for level 5)     Review of Systems  According to the history, no fevers or chills acute fall contusion or twisting injury. No recent cough or congestion or sinus issues or sore throat. No difficulty swallowing or taking liquids or shortness of breath or chest pain. No abdominal pain or distention or acute urine or stool changes but appetite is down today compared to usual.  No extremity acute weakness or loss of sensation or movement but balance seems potentially off to the patient's wife.   PAST MEDICAL HISTORY     Past Medical History:   Diagnosis Date    ADHD (attention deficit hyperactivity disorder)     Anxiety     Chronic back pain     Depression     Erectile dysfunction     Headache     Hypertension     Hypothyroidism     Low testosterone     Low testosterone level in male     Neuropathy     PTSD (post-traumatic stress disorder)     Thyroid disease        SURGICAL HISTORY     Past Surgical History:   Procedure Laterality Date    BICEPS TENDON REPAIR Left     SHOULDER SURGERY Right        CURRENTMEDICATIONS       Previous Medications    BLOOD PRESSURE KIT    1 kit by Does not apply route daily    LISINOPRIL (PRINIVIL;ZESTRIL) 10 MG TABLET    TAKE ONE TABLET BY MOUTH DAILY    METOPROLOL (LOPRESSOR) 100 MG TABLET    TAKE ONE TABLET BY MOUTH TWICE A DAY       ALLERGIES     Bactrim [sulfamethoxazole-trimethoprim]    FAMILYHISTORY     No family history on file. SOCIAL HISTORY       Social History     Socioeconomic History    Marital status:      Spouse name: Not on file    Number of children: Not on file    Years of education: Not on file    Highest education level: Not on file   Occupational History    Not on file   Tobacco Use    Smoking status: Former Smoker     Types: Cigarettes    Smokeless tobacco: Never Used   Vaping Use    Vaping Use: Never used   Substance and Sexual Activity    Alcohol use: Yes     Comment: occoasional use     Drug use: Yes     Types: Marijuana (Weed)     Comment: for pain and anxiety.  Sexual activity: Yes     Partners: Female   Other Topics Concern    Not on file   Social History Narrative    Not on file     Social Determinants of Health     Financial Resource Strain: Low Risk     Difficulty of Paying Living Expenses: Not hard at all   Food Insecurity: No Food Insecurity    Worried About Running Out of Food in the Last Year: Never true    [address] in the Last Year: Never true   Transportation Needs: No Transportation Needs    Lack of Transportation (Medical): No    Lack of Transportation (Non-Medical):  No   Physical Activity:     Days of Exercise per Week: Not on file    Minutes of Exercise per Session: Not on file   Stress:     Feeling of Stress : Not on file   Social Connections:     Frequency of Communication with Friends and Family: Not on file    Frequency of Social Gatherings with Friends and Family: Not on file    Attends Druze Services: Not on file    Active Member of Clubs or Organizations: Not on file    Attends Club or Organization Meetings: Not on file    Marital Status: Not on file   Intimate Partner Violence:     Fear of Current or Ex-Partner: Not on file    Emotionally Abused: Not on file    Physically Abused: Not on file    Sexually Abused: Not on file   Housing Stability:     Unable to Pay for Housing in the Last Year: Not on file    Number of Places Lived in the Last Year: Not on file    Unstable Housing in the Last Year: Not on file       SCREENINGS             PHYSICAL EXAM    (up to 7 for level 4, 8 or more for level 5)     ED Triage Vitals [12/03/21 1346]   BP Temp Temp Source Pulse Resp SpO2 Height Weight   (!) 147/99 97.1 °F (36.2 °C) Oral 74 19 97 % 5' 9\" (1.753 m) 216 lb 0.8 oz (98 kg)       Physical Exam  Vitals and nursing note reviewed. Constitutional:       Appearance: He is not diaphoretic. HENT:      Head: Normocephalic and atraumatic. Comments: Positive tenderness in the left scalp musculature and back down to the base of the skull to neck area with no central cervical spinal posterior process tenderness or palpable deformity or increased heat or redness bruising or abrasions. Right Ear: External ear normal.      Left Ear: External ear normal.      Nose: Nose normal.   Eyes:      General:         Right eye: No discharge. Left eye: No discharge. Conjunctiva/sclera: Conjunctivae normal.   Cardiovascular:      Rate and Rhythm: Normal rate and regular rhythm. Pulses: Normal pulses. Heart sounds: Normal heart sounds. No murmur heard. No gallop. Pulmonary:      Effort: Pulmonary effort is normal. No respiratory distress. Breath sounds: Normal breath sounds. No wheezing, rhonchi or rales. Abdominal:      Palpations: Abdomen is soft. Tenderness: There is no abdominal tenderness. Musculoskeletal:         General: No swelling, tenderness, deformity or signs of injury. Normal range of motion. Cervical back: Normal range of motion and neck supple. No rigidity or tenderness. Skin:     General: Skin is warm and dry. Capillary Refill: Capillary refill takes less than 2 seconds. Neurological:      Mental Status: He is alert. GCS: GCS eye subscore is 4. GCS verbal subscore is 4. GCS motor subscore is 5.       Comments: Patient orients to his wife and to the provider, very sluggish to perform tasks, makes his tongue deviated to the right, downward, and upward however not to the left on repeat asking. He does not give a loose  with the right hand however left hand potentially  is a little bit less. Patient's responses are slow and sluggish. If the provider leaves their mask on he does not respond at all but just stares at the provider, if they remove their mask he then selectively seems more inclined to try to answer a question.    Psychiatric:         Mood and Affect: Mood normal.         Behavior: Behavior normal.         DIAGNOSTIC RESULTS   LABS:    Labs Reviewed   CBC WITH AUTO DIFFERENTIAL - Abnormal; Notable for the following components:       Result Value    RBC 6.31 (*)     Hemoglobin 18.2 (*)     Hematocrit 53.8 (*)     All other components within normal limits    Narrative:     Performed at:  [address] [facility] [address]   Phone [phone]   COMPREHENSIVE METABOLIC PANEL W/ REFLEX TO MG FOR LOW K - Abnormal; Notable for the following components:    Glucose 107 (*)     Total Bilirubin 1.2 (*)     AST 43 (*)     All other components within normal limits    Narrative:     Performed at:  [address] [facility] [address]   Phone [phone]   URINE RT REFLEX TO CULTURE - Abnormal; Notable for the following components:    Bilirubin Urine SMALL (*)     Ketones, Urine 40 (*)     Protein, UA 30 (*)     All other components within normal limits    Narrative:     Performed at:  [address] [facility] [address]   Phone [phone]   URINE DRUG SCREEN - Abnormal; Notable for the following components:    Cannabinoid Scrn, Ur POSITIVE (*)     Opiate Scrn, Ur POSITIVE (*)     All other components within normal limits    Narrative:     Performed at:  [facility]  [address] [place] [address]   Phone [phone]   CULTURE, BLOOD 1   CULTURE, BLOOD 2   ETHANOL    Narrative:     Performed at:  [facility]  [address] [place] [address]   Phone [phone]   MICROSCOPIC URINALYSIS    Narrative:     Performed at:  [facility]  [address] [place] [address]   Phone [phone]       When ordered, only abnormal lab results are displayed. All other labs were within normal range or not returned as of this dictation. EKG: When ordered, EKG's are interpreted by the Emergency Department Physician in the absence of a cardiologist.  Please see their note for interpretation of EKG. RADIOLOGY:   Non-plain film images such as CT, Ultrasound and MRI are read by the radiologist. Plain radiographic images are visualized andpreliminarily interpreted by the  ED Provider with the below findings:        Interpretation perthe Radiologist below, if available at the time of this note:    CTA HEAD NECK W CONTRAST   Preliminary Result   Unremarkable CTA of the neck. Prominent right posterior communicating artery or fetal origin of the right   posterior cerebral artery which is a normal anatomic variant. No significant   abnormality of the intracranial circulation. Patchy airspace changes in the lung apices concerning for infiltrates. CT   could better evaluate lung parenchyma if indicated. CT HEAD WO CONTRAST   Final Result   No acute intracranial abnormality. Chronic pansinusitis. Prominent left mastoid effusion. XR CHEST PORTABLE   Final Result   Cardiomegaly      Bibasilar hypoaeration           XR CHEST PORTABLE    Result Date: 12/3/2021  EXAMINATION: ONE XRAY VIEW OF THE CHEST 12/3/2021 2:41 pm COMPARISON: None.  HISTORY: ORDERING SYSTEM PROVIDED HISTORY: other TECHNOLOGIST PROVIDED HISTORY: Reason for exam:->other Reason for Exam: HA,ams, difficulty hearing Acuity: Acute Type of Exam: Initial FINDINGS: Cardiomegaly. No acute airspace disease. No pneumothorax. No pulmonary vascular congestion or edema. Bibasilar hypoaeration. Cardiomegaly Bibasilar hypoaeration         PROCEDURES   Unless otherwise noted below, none     Procedures    CRITICAL CARE TIME   N/A    CONSULTS:  None      EMERGENCY DEPARTMENT COURSE and DIFFERENTIAL DIAGNOSIS/MDM:   Vitals:    Vitals:    12/03/21 1445 12/03/21 1500 12/03/21 1515 12/03/21 1600   BP: (!) 164/95 (!) 161/96 (!) 148/92 (!) 179/93   Pulse: 66 69 70 76   Resp: 23 21 19 16   Temp:   98.3 °F (36.8 °C)    TempSrc:   Oral    SpO2: 92% 95% 96% 98%   Weight:       Height:           Patient was given thefollowing medications:  Medications   vancomycin 1000 mg IVPB in 250 mL D5W addavial (has no administration in time range)   cefTRIAXone (ROCEPHIN) 2000 mg IVPB in D5W 50ml minibag (has no administration in time range)   dexamethasone (DECADRON) injection 10 mg (has no administration in time range)   0.9 % sodium chloride bolus (0 mLs IntraVENous Stopped 12/3/21 1637)   morphine (PF) injection 4 mg (4 mg IntraVENous Given 12/3/21 1525)   ondansetron (ZOFRAN) injection 4 mg (4 mg IntraVENous Given 12/3/21 1524)   methocarbamol (ROBAXIN) tablet 500 mg (500 mg Oral Given 12/3/21 1519)   iopamidol (ISOVUE-370) 76 % injection 75 mL (75 mLs IntraVENous Given 12/3/21 1615)   haloperidol lactate (HALDOL) injection 5 mg (5 mg IntraVENous Given 12/3/21 1639)   diphenhydrAMINE (BENADRYL) injection 25 mg (25 mg IntraVENous Given 12/3/21 1638)     This patient presents as above and evaluation and treatment is begun after they are placed in the ER room. There is no clear physical exam findings to indicate why patient is having more trouble hearing than usual or the other changes. EKG chest x-ray and CT head and CT C-spine both with contrast ordered at this time.   The timeframe of symptoms being present is reportedly since yesterday evening sometime. Plain CT of the head shows mastoid effusion on the left as well as sinusitis. Patient does not have elevated white cell count or fever here however presumptive treatment for acute mastoiditis begun with vancomycin as well as Rocephin, and, after consulting ENT, Dr. Bunny Boeck, also a dose of IV Decadron. He agrees to see the patient in the hospital tomorrow. Consultation to hospitalist for admission placed at this time. Patient is in fair condition. His pain is better controlled after IV medications are given though confusional/altered mental status remains. FINAL IMPRESSION      1. Acute confusion    2. Acute hearing loss, left    3. Mastoiditis of left side    4. Severe headache          DISPOSITION/PLAN   DISPOSITION Decision To Admit 12/03/2021 05:49:05 PM      PATIENT REFERREDTO:  No follow-up provider specified.     DISCHARGE MEDICATIONS:  New Prescriptions    No medications on file       DISCONTINUED MEDICATIONS:  Discontinued Medications    No medications on file              (Please note that portions ofthis note were completed with a voice recognition program.  Efforts were made to edit the dictations but occasionally words are mis-transcribed.)    Robb Willson PA-C (electronically signed)              Robb Willson PA-C  12/03/21 6028

## 2021-12-03 NOTE — ED NOTES
Pt unable to follow commands just stares at RN while speaking  He does continue to rub the left side of his mouth. Pt is altered     Wife at triage whom is very aggressive with RN when asked questions.       Pat Whatley RN  12/03/21 5176

## 2021-12-03 NOTE — ED PROVIDER NOTES
[address]      Pt Name: Governor Rosado  MRN: [id number]  [place] 1970  Date of evaluation: 12/3/2021  Provider: Kulwinder Dotson, [facility]  Chief Complaint   Patient presents with    Headache     states head hurts went to bed after that.  Hearing Problem     states lost hearing yesterday started mid day she cannot give a time     Numbness     left side of face numbness started yesterday. brought in via wife she states that she doesnt know any further information d/t him not being able to hear her. she believes within the past hour. I have fully participated in the care of Governor Rosado and have had a face-to-face evaluation. I have reviewed and agree with all pertinent clinical information, and midlevel provider's history, and physical exam. I have also reviewed the labs, EKG, and imaging studies and treatment plan. I have also reviewed and agree with the medications, allergies and past medical history section for this Governor Rosado. I agree with the diagnosis, and I concur. I wore personal protective equipment when I was in the room the entire time. This includes gloves, N95 mask, face shield, and a glove over my stethoscope for protection. 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 Is been lethargic. Has not been eating or drinking anything. They deny fevers or chills. Denies nausea vomiting. Is been occurring for 24 hours. They deny use of illegal drugs or alcohol.   Nothing makes it better or worse.  They describe his symptoms as moderate to severe. His wife is present and gives most the history. Patient ask repetitive questions frequently. Physical exam is nonfocal.  EACs are patent bilaterally. He has tenderness over the left mastoid with mild erythema but no swelling. There is no evidence of injury. Neck is supple. Oropharynx is clear and moist.  Heart is regular rate and rhythm murmurs clicks or rubs. Lungs are clear to auscultation. Abdomen soft and nontender. Neuro exam shows NIH stroke scale 2 because he cannot answer questions. His work-up did not reveal a cause for his symptoms. Patient improved actually with Haldol and Benadryl. He became more cooperative and was still confused. Therefore, patient was admitted to the hospital for MRI tomorrow. CT scans were negative. Lab work was negative. Patient remained stable throughout his emerge department stay. Hospitalist was notified by the physician assistant.     Vitals:    12/03/21 2234   BP: (!) 155/107   Pulse: 60   Resp: 16   Temp: 98 °F (36.7 °C)   SpO2: 94%       Lab results  Labs Reviewed   CBC WITH AUTO DIFFERENTIAL - Abnormal; Notable for the following components:       Result Value    RBC 6.31 (*)     Hemoglobin 18.2 (*)     Hematocrit 53.8 (*)     All other components within normal limits    Narrative:     Performed at:  [address] [address]   Phone [phone]   COMPREHENSIVE METABOLIC PANEL W/ REFLEX TO MG FOR LOW K - Abnormal; Notable for the following components:    Glucose 107 (*)     Total Bilirubin 1.2 (*)     AST 43 (*)     All other components within normal limits    Narrative:     Performed at:  [facility]  [address] [place] [address]   Phone [phone]   URINE RT REFLEX TO CULTURE - Abnormal; Notable for the following components:    Bilirubin Urine SMALL (*)     Ketones, Urine 40 (*) Protein, UA 30 (*)     All other components within normal limits    Narrative:     Performed at:  [facility]  [address] [facility] [place] [address]   Phone [phone]   [address] - Abnormal; Notable for the following components:    Cannabinoid Scrn, Ur POSITIVE (*)     Opiate Scrn, Ur POSITIVE (*)     All other components within normal limits    Narrative:     Performed at:  [facility]  [address] [facility] [address]   Phone [phone]   CULTURE, BLOOD 1   CULTURE, BLOOD 2   ETHANOL    Narrative:     Performed at:  [facility]  [address] [facility] [place] [address]   Phone [phone]   MICROSCOPIC URINALYSIS    Narrative:     Performed at:  [facility]  [address] [facility] [place] [address]   Phone [phone]   BASIC METABOLIC PANEL W/ REFLEX TO MG FOR LOW K   CBC         Radiology results  CTA HEAD NECK W CONTRAST   Preliminary Result   Unremarkable CTA of the neck. Prominent right posterior communicating artery or fetal origin of the right   posterior cerebral artery which is a normal anatomic variant. No significant   abnormality of the intracranial circulation. Patchy airspace changes in the lung apices concerning for infiltrates. CT   could better evaluate lung parenchyma if indicated. CT HEAD WO CONTRAST   Final Result   No acute intracranial abnormality. Chronic pansinusitis. Prominent left mastoid effusion.          XR CHEST PORTABLE   Final Result   Cardiomegaly      Bibasilar hypoaeration         MRI BRAIN WO CONTRAST    (Results Pending)       Medications   sodium chloride flush 0.9 % injection 10 mL (10 mLs IntraVENous Not Given 12/3/21 2154)   sodium chloride flush 0.9 % injection 10 mL (has no administration in time range)   0.9 % sodium chloride infusion (25 mLs IntraVENous New Bag 12/3/21 2150) potassium chloride 10 mEq/100 mL IVPB (Peripheral Line) (has no administration in time range)   magnesium sulfate 2000 mg in 50 mL IVPB premix (has no administration in time range)   enoxaparin (LOVENOX) injection 40 mg (has no administration in time range)   ondansetron (ZOFRAN-ODT) disintegrating tablet 4 mg (has no administration in time range)     Or   ondansetron (ZOFRAN) injection 4 mg (has no administration in time range)   magnesium hydroxide (MILK OF MAGNESIA) 400 MG/5ML suspension 30 mL (has no administration in time range)   acetaminophen (TYLENOL) tablet 650 mg (has no administration in time range)     Or   acetaminophen (TYLENOL) suppository 650 mg (has no administration in time range)   levothyroxine (SYNTHROID) tablet 125 mcg (has no administration in time range)   metoprolol tartrate (LOPRESSOR) tablet 100 mg (100 mg Oral Given 12/3/21 2147)   cefTRIAXone (ROCEPHIN) 1000 mg IVPB in 50 mL D5W minibag (has no administration in time range)   vancomycin (VANCOCIN) 1500 mg in dextrose 5 % 250 mL IVPB (has no administration in time range)   vancomycin (VANCOCIN) 500 mg in dextrose 5 % 100 mL IVPB (500 mg IntraVENous New Bag 12/3/21 2153)   vancomycin (VANCOCIN) intermittent dosing (placeholder) ( Other Not Given 12/3/21 2132)   0.9 % sodium chloride bolus (0 mLs IntraVENous Stopped 12/3/21 1637)   morphine (PF) injection 4 mg (4 mg IntraVENous Given 12/3/21 1525)   ondansetron (ZOFRAN) injection 4 mg (4 mg IntraVENous Given 12/3/21 1524)   methocarbamol (ROBAXIN) tablet 500 mg (500 mg Oral Given 12/3/21 1519)   iopamidol (ISOVUE-370) 76 % injection 75 mL (75 mLs IntraVENous Given 12/3/21 1615)   haloperidol lactate (HALDOL) injection 5 mg (5 mg IntraVENous Given 12/3/21 1639)   diphenhydrAMINE (BENADRYL) injection 25 mg (25 mg IntraVENous Given 12/3/21 1638)   vancomycin 1000 mg IVPB in 250 mL D5W addavial ( IntraVENous Stopped 12/3/21 2102)   cefTRIAXone (ROCEPHIN) 2000 mg IVPB in D5W 50ml minibag (0 mg IntraVENous Stopped 12/3/21 1948)   dexamethasone (DECADRON) injection 10 mg (10 mg IntraVENous Given 12/3/21 1850)       Current Discharge Medication List          The patient's blood pressure was found to be elevated according to CMS/Medicare and the Affordable Care Act/[facility] criteria. Elevated blood pressure could occur because of pain or anxiety or other reasons and does not mean that they need to have their blood pressure treated or medications otherwise adjusted. However, this could also be a sign that they will need to have their blood pressure treated or medications changed. The patient was instructed to follow up closely with their personal physician to have their blood pressure rechecked. The patient was instructed to take a list of recent blood pressure readings to their next visit with their personal physician. IMPRESSIONS:  1. Acute confusion    2. Acute hearing loss, left    3. Mastoiditis of left side    4.  Severe headache               Carli Rayo DO  12/03/21 9394

## 2021-12-04 ENCOUNTER — APPOINTMENT (OUTPATIENT)
Dept: MRI IMAGING | Age: 51
DRG: 113 | End: 2021-12-04
Payer: COMMERCIAL

## 2021-12-04 LAB
ANION GAP SERPL CALCULATED.3IONS-SCNC: 18 MMOL/L (ref 3–16)
BUN BLDV-MCNC: 14 MG/DL (ref 7–20)
CALCIUM SERPL-MCNC: 9.1 MG/DL (ref 8.3–10.6)
CHLORIDE BLD-SCNC: 102 MMOL/L (ref 99–110)
CO2: 17 MMOL/L (ref 21–32)
CREAT SERPL-MCNC: 0.9 MG/DL (ref 0.9–1.3)
GFR AFRICAN AMERICAN: >60
GFR NON-AFRICAN AMERICAN: >60
GLUCOSE BLD-MCNC: 115 MG/DL (ref 70–99)
HCT VFR BLD CALC: 52.8 % (ref 40.5–52.5)
HEMOGLOBIN: 17.7 G/DL (ref 13.5–17.5)
MCH RBC QN AUTO: 28.8 PG (ref 26–34)
MCHC RBC AUTO-ENTMCNC: 33.6 G/DL (ref 31–36)
MCV RBC AUTO: 85.8 FL (ref 80–100)
PDW BLD-RTO: 12.8 % (ref 12.4–15.4)
PLATELET # BLD: 387 K/UL (ref 135–450)
PMV BLD AUTO: 9.3 FL (ref 5–10.5)
POTASSIUM REFLEX MAGNESIUM: 4.2 MMOL/L (ref 3.5–5.1)
RBC # BLD: 6.16 M/UL (ref 4.2–5.9)
SODIUM BLD-SCNC: 137 MMOL/L (ref 136–145)
VANCOMYCIN TROUGH: <4 UG/ML (ref 10–20)
WBC # BLD: 9.5 K/UL (ref 4–11)

## 2021-12-04 PROCEDURE — 85027 COMPLETE CBC AUTOMATED: CPT

## 2021-12-04 PROCEDURE — 2580000003 HC RX 258: Performed by: INTERNAL MEDICINE

## 2021-12-04 PROCEDURE — 2060000000 HC ICU INTERMEDIATE R&B

## 2021-12-04 PROCEDURE — G0378 HOSPITAL OBSERVATION PER HR: HCPCS

## 2021-12-04 PROCEDURE — 6360000002 HC RX W HCPCS: Performed by: INTERNAL MEDICINE

## 2021-12-04 PROCEDURE — 36415 COLL VENOUS BLD VENIPUNCTURE: CPT

## 2021-12-04 PROCEDURE — 70551 MRI BRAIN STEM W/O DYE: CPT

## 2021-12-04 PROCEDURE — 96366 THER/PROPH/DIAG IV INF ADDON: CPT

## 2021-12-04 PROCEDURE — 80048 BASIC METABOLIC PNL TOTAL CA: CPT

## 2021-12-04 PROCEDURE — 80202 ASSAY OF VANCOMYCIN: CPT

## 2021-12-04 PROCEDURE — 6370000000 HC RX 637 (ALT 250 FOR IP): Performed by: INTERNAL MEDICINE

## 2021-12-04 PROCEDURE — 99252 IP/OBS CONSLTJ NEW/EST SF 35: CPT | Performed by: OTOLARYNGOLOGY

## 2021-12-04 PROCEDURE — 6370000000 HC RX 637 (ALT 250 FOR IP): Performed by: NURSE PRACTITIONER

## 2021-12-04 PROCEDURE — 96372 THER/PROPH/DIAG INJ SC/IM: CPT

## 2021-12-04 RX ORDER — ACETAMINOPHEN 650 MG/1
650 SUPPOSITORY RECTAL EVERY 6 HOURS PRN
Status: DISCONTINUED | OUTPATIENT
Start: 2021-12-04 | End: 2021-12-05 | Stop reason: HOSPADM

## 2021-12-04 RX ORDER — ACETAMINOPHEN 325 MG/1
650 TABLET ORAL EVERY 6 HOURS PRN
Status: DISCONTINUED | OUTPATIENT
Start: 2021-12-04 | End: 2021-12-05 | Stop reason: HOSPADM

## 2021-12-04 RX ORDER — DIPHENHYDRAMINE HCL 25 MG
50 TABLET ORAL NIGHTLY PRN
Status: DISCONTINUED | OUTPATIENT
Start: 2021-12-04 | End: 2021-12-05 | Stop reason: HOSPADM

## 2021-12-04 RX ADMIN — Medication 10 ML: at 22:12

## 2021-12-04 RX ADMIN — Medication 1500 MG: at 11:38

## 2021-12-04 RX ADMIN — CEFTRIAXONE 1000 MG: 1 INJECTION, POWDER, FOR SOLUTION INTRAMUSCULAR; INTRAVENOUS at 17:48

## 2021-12-04 RX ADMIN — METOPROLOL TARTRATE 100 MG: 50 TABLET, FILM COATED ORAL at 09:20

## 2021-12-04 RX ADMIN — METOPROLOL TARTRATE 100 MG: 50 TABLET, FILM COATED ORAL at 22:11

## 2021-12-04 RX ADMIN — VANCOMYCIN HYDROCHLORIDE 1750 MG: 1 INJECTION, POWDER, LYOPHILIZED, FOR SOLUTION INTRAVENOUS at 22:18

## 2021-12-04 RX ADMIN — ENOXAPARIN SODIUM 40 MG: 100 INJECTION SUBCUTANEOUS at 09:20

## 2021-12-04 RX ADMIN — Medication 10 ML: at 09:21

## 2021-12-04 RX ADMIN — DIPHENHYDRAMINE HCL 50 MG: 25 TABLET ORAL at 22:11

## 2021-12-04 RX ADMIN — LEVOTHYROXINE SODIUM 125 MCG: 0.12 TABLET ORAL at 06:42

## 2021-12-04 ASSESSMENT — PAIN SCALES - GENERAL: PAINLEVEL_OUTOF10: 0

## 2021-12-04 NOTE — PROGRESS NOTES
Medication Reconciliation     List of medications patient is currently taking is complete. Source of information:   1. Conversation with patient's wife at bedside  2. EPIC records        Notes regarding home medications:  1. Wife confirmed pt only takes metoprolol and levothyroxine as well as tylenol as needed.   2. Pt last received medications yesterday  Prakash Mason, Pharmacy Intern 12/3/2021 7:20 PM

## 2021-12-04 NOTE — PROGRESS NOTES
Occupational Therapy  Status Note    Brandie Narayanan  12/4/2021  [id number]    OT order noted. Pt met bedside, seated in recliner chair eating breakfast. Pt reports he cannot hear much out of his left ear and his right ear sounds different but he can hear \"a little bit\". Able to have conversation at normal volume. Pt reports he is moving around the room and completing toileting tasks without assistance. Pt denies numbness/tingling, no strength discrepancies noted from left to right. Pt is at baseline, despite hearing deficits. No further OT needs at this time, please reorder if functional concerns arise.      Electronically signed by CLAUDIA Knox/L#[id number]  on 12/4/2021 at 9:25 AM

## 2021-12-04 NOTE — PROGRESS NOTES
Hospitalist   Progress Note    Patient Name: Boy Dodd  PCP: Dejan Wilde MD  Date of Admission: 12/3/2021    Chief Complaint on Admission: Confusion, hearing loss  Chief diagnosis after evaluation: Under evaluation    Brief Synopsis: Patient 54 y.o. man with a history of ADHD, erectile dysfunction hypertension PTSD who was admitted on 12/3/2021 for evaluation and treatment of confusion, hearing loss. Found to have left mastoiditis. MRI scheduled for today    Pt Seen/Examined and Chart Reviewed. Subjective: Pt is feeling better and has no new complaints. No longer confused. Decresed hearing on left side    Objective:   Allergies  Bactrim [sulfamethoxazole-trimethoprim]    Medications    Scheduled Meds:   sodium chloride flush  10 mL IntraVENous 2 times per day    enoxaparin  40 mg SubCUTAneous Daily    levothyroxine  125 mcg Oral Daily    metoprolol tartrate  100 mg Oral BID    cefTRIAXone (ROCEPHIN) IV  1,000 mg IntraVENous Q24H    vancomycin  1,500 mg IntraVENous Q12H    vancomycin (VANCOCIN) intermittent dosing (placeholder)   Other RX Placeholder     Infusions:   sodium chloride 25 mL (12/03/21 2150)     PRN Meds:  sodium chloride flush, sodium chloride, potassium chloride, magnesium sulfate, ondansetron **OR** ondansetron, magnesium hydroxide, acetaminophen **OR** acetaminophen    Physical    VITALS:  BP (!) 142/89   Pulse 69   Temp 97.6 °F (36.4 °C) (Oral)   Resp 18   Ht 5' 9\" (1.753 m)   Wt 218 lb 14.4 oz (99.3 kg)   SpO2 91%   BMI 32.33 kg/m²   CONSTITUTIONAL:  WD/WN 54y.o. year-old male who is sitting in a chair awake, alert, cooperative, no apparent distress, and appears stated age  EYES:  Lids and lashes normal, PERRL, EOMI, sclera clear, conjunctiva normal  ENT:  NC/AT, MMM    NECK:  Supple, symmetrical, trachea midline, no adenopathy  HEMATOLOGIC/LYMPHATICS:  no cervical, supraclavicular or axillary lymphadenopathy  LUNGS:  clear to auscultation bilaterally, No increased work of breathing, good air exchange, no crackles or wheezing  CARDIOVASCULAR:  Regular rate and rhythm, normal S1 and S2, no S3 or S4, and no significant murmurs, rubs or gallops noted. Normal apical impulse. ABDOMEN:  Normal active bowel sounds, soft, non-tender, non-distended, no masses palpated, no organomegally  MUSCULOSKELETAL:  Full range of motion noted. MENTAL STATUS: Awake, alert, oriented to name, place and time. NEUROLOGIC:  Cranial nerves II-XII are grossly intact. SKIN:  normal skin color, texture, turgor for age.     Data    CBC with Differential:    Lab Results   Component Value Date    WBC 7.6 12/03/2021    HGB 18.2 12/03/2021    HCT 53.8 12/03/2021     12/03/2021    MCV 85.3 12/03/2021    RDW 13.0 12/03/2021    LYMPHOPCT 29.3 12/03/2021    MONOPCT 13.4 12/03/2021    BASOPCT 1.1 12/03/2021    MONOSABS 1.0 12/03/2021    LYMPHSABS 2.2 12/03/2021    EOSABS 0.1 12/03/2021    BASOSABS 0.1 12/03/2021     BMP:    Lab Results   Component Value Date     12/03/2021    K 4.2 12/03/2021     12/03/2021    CO2 23 12/03/2021    BUN 14 12/03/2021    CREATININE 1.0 12/03/2021    GLUCOSE 107 12/03/2021    CALCIUM 9.6 12/03/2021    GFRAA >60 12/03/2021    LABGLOM >60 12/03/2021     LFT:   Lab Results   Component Value Date    ALKPHOS 113 12/03/2021    ALT 34 12/03/2021    AST 43 12/03/2021    PROT 7.7 12/03/2021    BILITOT 1.2 12/03/2021     Magnesium:  No results found for: MG  Phosphorus:  No results found for: PHOS  PT/INR:  No results found for: PTINR  U/A:    Lab Results   Component Value Date    LEUKOCYTESUR Negative 12/03/2021    WBCUA 1 12/03/2021    RBCUA 2 12/03/2021    SPECGRAV >1.030 12/03/2021    UROBILINOGEN 1.0 12/03/2021    BILIRUBINUR SMALL 12/03/2021    BLOODU Negative 12/03/2021    GLUCOSEU Negative 12/03/2021    PROTEINU 30 12/03/2021     ABG:  No results found for: PHART, OCL1IZW, A2UQJWTC, YEQ2VCT, BEART, THGBART, PO2ART, UNU7THK        Intake/Output Summary (Last 24 hours) at 12/4/2021 0849  Last data filed at 12/3/2021 2234  Gross per 24 hour   Intake 320.13 ml   Output --   Net 320.13 ml       Consults:  IP CONSULT TO PHARMACY  PHARMACY TO DOSE VANCOMYCIN  IP CONSULT TO OTOLARYNGOLOGY  IP CONSULT TO Dayan Amin Problems    Diagnosis Date Noted    AMS (altered mental status) [R41.82] 12/03/2021       ASSESSMENT AND PLAN:     Acute encephalopathy - appears to have resolved. Possibly effort dependent. Possibly due to illicit drug abuse. Toxicology screen was positive for opiates and cannabinoids  - MRI scheduled for today    Acute left-sided hearing loss - appears to be secondary to mastoiditis. ENT consulted and appreciated. Mastoiditis left side - continue vancomycin and Rocephin per ENT    Headache -improved. No complaints of headache this morning. Essential (primary) hypertension - continue home meds and monitor blood pressure    Acquired hypothyroidism - stable; continue Levothyroxine        DVT Prophylaxis: Lovenox  Diet: ADULT DIET; Regular  Code Status: Full Code    PT/OT Eval Status: Completed.  No needs    Dispo - Anticipated discharge date 1-2+ days    Ariadne Overton MD

## 2021-12-04 NOTE — ED NOTES
[facility]. Patient to be transported to Room 3109 via stretcher by transport tech. Patient transported with bedside cardiac monitor and with IV medications infusing. IV site clean, dry, and intact. MEWS score and pain assessed and documented. Updated patient and family on plan of care.      Mikie Julio RN  12/03/21 [id number]

## 2021-12-04 NOTE — PROGRESS NOTES
Patient transferred to Room 5275 from Room 3109 . Telemetry applied. Oriented patient to to room and call light. Plan of care reviewed with patient. Informed of MRI happening tomorrow and about stroke. Call light within reach. Will continue to monitor.

## 2021-12-04 NOTE — PROGRESS NOTES
NAME:  Geoff Hahn  YOB: 1970  MEDICAL RECORD NUMBER:  [id number]  TODAYS DATE:  12/4/2021    Discussed personal risk factors for Stroke /TIA with patient/family, and ways to reduce the risk for a recurrent stroke. Patient's personal risk factors which were identified are:     [x] Alcohol Abuse: check with your physician before any alcohol consumption. [] Atrial fibrillation: may cause blood clots. [x] Drug Abuse: Seek help, talk with your doctor  [] Clotting Disorder  [] Diabetes  [] Family history of stroke or heart disease  [x] High Blood Pressure/Hypertension: work with your physician.  [] High cholesterol: monitor cholesterol levels with your physician. [x] Overweight/Obesity: work with your physician for your ideal body weight.  [] Physical Inactivity: get regular exercise as directed by your physician. [] Personal history of previous TIA or stroke  [] Poor Diet; decrease salt (sodium) in your diet, follow diet directed by physician. [] Smoking: Cigarette/Cigar: stop smoking. Advised pt. that you can reduce your risk for stroke/TIA by modifying/controlling the risk factors that you have. Pt.advised to take the medications as prescribed, which will be detailed in the discharge instructions, and to not stop taking them without consulting their physician. In addition, pt. advised to maintain a healthy diet, exercise regularly and to not smoke. [facility]'s Stroke treatment and prevention, Managing your recovery  notebook  provided and/or reviewed  with patient/family. The notebook includes, but not limited to, sections addressing warning signs & symptoms of a stroke, which are: sudden numbness or weakness especially on one side of the body, sudden confusion, difficulty speaking or understanding, sudden changes in vision, sudden dizziness or loss of balance/ coordination, sudden severe headache, syncope and seizure.   The need to call EMS (911) immediately if signs & symptoms occur is emphasized . The notebook also provides education on Stroke community resources and stroke advocacy. The need for follow-up after discharge was highlighted with patient/family with them being able to repeat understanding of the importance of this.       Electronically signed by Breonna Hernandez RN on 12/4/2021 at 3:13 AM

## 2021-12-04 NOTE — PLAN OF CARE
Problem: Pain:  Goal: Pain level will decrease  Description: Pain level will decrease  Outcome: Ongoing  Goal: Control of acute pain  Description: Control of acute pain  Outcome: Ongoing  Goal: Control of chronic pain  Description: Control of chronic pain  Outcome: Ongoing  Goal: Patient's pain/discomfort is manageable  Description: Patient's pain/discomfort is manageable  Outcome: Ongoing     Problem: Falls - Risk of:  Goal: Will remain free from falls  Description: Will remain free from falls  Outcome: Ongoing  Goal: Absence of physical injury  Description: Absence of physical injury  Outcome: Ongoing     Problem: Infection:  Goal: Will remain free from infection  Description: Will remain free from infection  Outcome: Ongoing     Problem: Safety:  Goal: Free from accidental physical injury  Description: Free from accidental physical injury  Outcome: Ongoing  Goal: Free from intentional harm  Description: Free from intentional harm  Outcome: Ongoing     Problem: Daily Care:  Goal: Daily care needs are met  Description: Daily care needs are met  Outcome: Ongoing     Problem: Skin Integrity:  Goal: Skin integrity will stabilize  Description: Skin integrity will stabilize  Outcome: Ongoing     Problem: Discharge Planning:  Goal: Patients continuum of care needs are met  Description: Patients continuum of care needs are met  Outcome: Ongoing     Problem: HEMODYNAMIC STATUS  Goal: Patient has stable vital signs and fluid balance  Outcome: Ongoing     Problem: ACTIVITY INTOLERANCE/IMPAIRED MOBILITY  Goal: Mobility/activity is maintained at optimum level for patient  Outcome: Ongoing     Problem: COMMUNICATION IMPAIRMENT  Goal: Ability to express needs and understand communication  Outcome: Ongoing

## 2021-12-04 NOTE — PROGRESS NOTES
MRI ordered for tomorrow for CVA rule out. Message sent to [facility] NP asking if we need to be doing neuro and stroke assessments. New orders placed for NIHSS scale. Tarun Schultz notififed that the patient will need to be transferred. RN supervisor made aware. Patients wife updated about the possible transfer, wife agreeable but would like an update if transferred to a new room.    Electronically signed by Karlie Dale RN on 12/3/2021 at 9:00 PM

## 2021-12-04 NOTE — PROGRESS NOTES
4 Eyes Skin Assessment     NAME:  Jenny Santa  YOB: 1970  MEDICAL RECORD NUMBER:  [id number]    The patient is being assess for  Transfer to New Unit    I agree that 2 RN's have performed a thorough Head to Toe Skin Assessment on the patient. ALL assessment sites listed below have been assessed. Areas assessed by both nurses:    Head, Face, Ears, Shoulders, Back, Chest, Arms, Elbows, Hands, Sacrum. Buttock, Coccyx, Ischium and Legs. Feet and Heels        Does the Patient have a Wound?  No noted wound(s)       Chucky Prevention initiated:  No   Wound Care Orders initiated:  No    Pressure Injury (Stage 3,4, Unstageable, DTI, NWPT, and Complex wounds) if present place consult order under [de-identified] No    New and Established Ostomies if present place consult order under : No      Nurse 1 eSignature: Electronically signed by Leah Isabel RN on 12/4/21 at 3:07 AM EST    **SHARE this note so that the co-signing nurse is able to place an eSignature**    Nurse 2 eSignature: Electronically signed by Lara San RN on 12/4/21 at 5:10 AM EST

## 2021-12-04 NOTE — CONSULTS
[facility]      Patient Name: Rupa Foote  Medical Record Number:  [id number]  Primary Care Physician:  Ernst Coon MD  Date of Consultation: 2021    Hearing Loss HPI  CC: hearing loss    General: Micaela Gifford is a(n) 54 y.o. male who presents with a 2 day history of hearing loss, fatigue and some confusion. Was in ER last night and CT showed left mastoid inflammation and moderate sinus inflammation consistent with chronic inflammatory disease. Denies sinus problems or prior ear problems. States sound muffled and hears voice in left ear. Started on antibiotics last night. Pain not severe handled with tylenol. How lon days  Side:left  Prior audiogram:No  Previous episodes: no  Tinnitus:No  Otorrhea:No  Vertigo:No  Prior ear surgery: No  Ear trauma: No  History of hearing loss: No  PMHx: Patient denies. ..  birth anoxia, ototoxic medication exposure, syncope, thyroid problems  Family history of hearing loss: No        Patient Active Problem List   Diagnosis    Low testosterone in male    Fatigue    Hypothyroidism    Insomnia    Depression    Hypertension    AMS (altered mental status)     Past Surgical History:   Procedure Laterality Date    BICEPS TENDON REPAIR Left     SHOULDER SURGERY Right      Family History   Problem Relation Age of Onset    Diabetes Maternal Grandmother      Social History     Socioeconomic History    Marital status:      Spouse name: Not on file    Number of children: Not on file    Years of education: Not on file    Highest education level: Not on file   Occupational History    Not on file   Tobacco Use    Smoking status: Former Smoker     Types: Cigarettes     Quit date: 12/3/2012     Years since quittin.0    Smokeless tobacco: Never Used   Vaping Use    Vaping Use: Never used   Substance and Sexual Activity    Alcohol use: Yes     Comment: occoasional use/SOCIALLY    Drug use: Not Currently    Sexual activity: Yes     Partners: Female   Other Topics Concern    Not on file   Social History Narrative    Not on file     Social Determinants of Health     Financial Resource Strain: Low Risk     Difficulty of Paying Living Expenses: Not hard at all   Food Insecurity: No Food Insecurity    Worried About Running Out of Food in the Last Year: Never true    [address] in the Last Year: Never true   Transportation Needs: No Transportation Needs    Lack of Transportation (Medical): No    Lack of Transportation (Non-Medical): No   Physical Activity:     Days of Exercise per Week: Not on file    Minutes of Exercise per Session: Not on file   Stress:     Feeling of Stress : Not on file   Social Connections:     Frequency of Communication with Friends and Family: Not on file    Frequency of Social Gatherings with Friends and Family: Not on file    Attends Roman [employer] Services: Not on file    Active Member of [address] [place] [address] [employer] or Organizations: Not on file    Attends Club or Organization Meetings: Not on file    Marital Status: Not on file   Intimate Partner Violence:     Fear of Current or Ex-Partner: Not on file    Emotionally Abused: Not on file    Physically Abused: Not on file    Sexually Abused: Not on file   Housing Stability:     Unable to Pay for Housing in the Last Year: Not on file    Number of [place] in the Last Year: Not on file    Unstable Housing in the Last Year: Not on file       DRUG/FOOD ALLERGIES: Bactrim [sulfamethoxazole-trimethoprim]    CURRENT MEDICATIONS  Prior to Admission medications    Medication Sig Start Date End Date Taking?  Authorizing Provider   acetaminophen (TYLENOL) 500 MG tablet Take 500 mg by mouth every 6 hours as needed 7/9/21  Yes Historical Provider, MD   levothyroxine (SYNTHROID) 125 MCG tablet Take 125 mcg by mouth Daily  10/22/21  Yes Historical Provider, MD   metoprolol (LOPRESSOR) 100 MG tablet TAKE ONE TABLET BY MOUTH TWICE A DAY 3/22/21  Yes Daja Robles MD Brooke       Lab Studies:  Lab Results   Component Value Date    WBC 9.5 12/04/2021    HGB 17.7 (H) 12/04/2021    HCT 52.8 (H) 12/04/2021    MCV 85.8 12/04/2021     12/04/2021     Lab Results   Component Value Date    GLUCOSE 115 (H) 12/04/2021    BUN 14 12/04/2021    CREATININE 0.9 12/04/2021    K 4.2 12/04/2021     12/04/2021     12/04/2021    CALCIUM 9.1 12/04/2021     No results found for: MG  No results found for: PHOS  Lab Results   Component Value Date    ALKPHOS 113 12/03/2021    ALT 34 12/03/2021    AST 43 (H) 12/03/2021    BILITOT 1.2 (H) 12/03/2021    PROT 7.7 12/03/2021           REVIEW OF SYSTEMS  The following systems were reviewed and revealed the following in addition to any already discussed in the HPI:    CONSTITUTIONAL: No weight loss, no fever, no night sweats, no chills  EYES: no vision changes, no blurry vision  EARS: Changes in hearing, left  Mild otalgia  NOSE: no epistaxis, no rhinorrhea  RESPIRATORY: No Difficulty breathing, no shortness of breath  CV: no chest pain, NO Peripheral vascular disease  HEME: No coagulation disorder, No Bleeding disorder  NEURO: no TIA or stroke-like symptoms  SKIN: No new rashes in the head and neck, no recent skin cancers  MOUTH: No new ulcers, no recent teeth infections  GASTROINTESTINAL: No diarrhea, stomach pain  PSYCH: No anxiety, no depression      PHYSICAL EXAM  /89   Pulse 91   Temp 98.1 °F (36.7 °C)   Resp 16   Ht 5' 9\" (1.753 m)   Wt 218 lb 14.4 oz (99.3 kg)   SpO2 97%   BMI 32.33 kg/m²     GENERAL: No Acute Distress, Alert and Oriented, No Hoarseness  EYES: EOMI, Anti-icteric  NOSE: No epistaxis, nasal mucosa within normal limits, no purulent drainage  EARS: Normal external canal appearance, Right  EAC patent, Left EAC patent. Left tm with moderate retraction. No pockets. Sterile appearing effusion. Right tm without retraction. NO fluid.    FACE: 1/6 House-Brackmann Scale, symmetric, sensation equal service. Medical Decision Making:   The following items were considered in medical decision making:  Independent review of images  Review / order clinical lab tests  Review / order radiology tests  Decision to obtain old records  Review and summation of old records as accessed through Dayanna Strickland 50 (a summary of my findings in these old records: NA)

## 2021-12-04 NOTE — CONSULTS
Clinical Pharmacy Note  Vancomycin Consult    Selena Noble is a 54 y.o. male ordered Vancomycin for Mastoiditis; consult received from Dr. Shoaib Knutson to manage therapy. Also receiving Rocephin. Patient Active Problem List   Diagnosis    Low testosterone in male    Fatigue    Hypothyroidism    Insomnia    Depression    Hypertension    AMS (altered mental status)       Allergies:  Bactrim [sulfamethoxazole-trimethoprim]     Temp max:  Temp (24hrs), Av °F (36.7 °C), Min:97.1 °F (36.2 °C), Max:98.5 °F (36.9 °C)      Recent Labs     21  1424   WBC 7.6       Recent Labs     21  1424   BUN 14   CREATININE 1.0       No intake or output data in the 24 hours ending 21 2050      Ht Readings from Last 1 Encounters:   21 5' 9\" (1.753 m)        Wt Readings from Last 1 Encounters:   21 218 lb 7.6 oz (99.1 kg)         Estimated Creatinine Clearance: 104 mL/min (based on SCr of 1 mg/dL). Assessment/Plan:  Patient received Vancomycin 1000 mg IV in the ED. Will give and additional 500 mg now and then start Vancomycin 1500 mg IV every 12 hours. Regimen projects a trough level of 15 mg/L. Level ordered for 21. Thank you for the consult.    Jennifer Sharpe [facility],12/3/2021,8:51 PM

## 2021-12-05 VITALS
BODY MASS INDEX: 32.26 KG/M2 | SYSTOLIC BLOOD PRESSURE: 133 MMHG | HEIGHT: 69 IN | OXYGEN SATURATION: 95 % | TEMPERATURE: 97.6 F | DIASTOLIC BLOOD PRESSURE: 82 MMHG | RESPIRATION RATE: 15 BRPM | HEART RATE: 64 BPM | WEIGHT: 217.8 LBS

## 2021-12-05 LAB
ANION GAP SERPL CALCULATED.3IONS-SCNC: 11 MMOL/L (ref 3–16)
BUN BLDV-MCNC: 15 MG/DL (ref 7–20)
CALCIUM SERPL-MCNC: 9 MG/DL (ref 8.3–10.6)
CHLORIDE BLD-SCNC: 106 MMOL/L (ref 99–110)
CO2: 22 MMOL/L (ref 21–32)
CREAT SERPL-MCNC: 0.9 MG/DL (ref 0.9–1.3)
GFR AFRICAN AMERICAN: >60
GFR NON-AFRICAN AMERICAN: >60
GLUCOSE BLD-MCNC: 85 MG/DL (ref 70–99)
HCT VFR BLD CALC: 49.9 % (ref 40.5–52.5)
HEMOGLOBIN: 16.8 G/DL (ref 13.5–17.5)
MCH RBC QN AUTO: 28.5 PG (ref 26–34)
MCHC RBC AUTO-ENTMCNC: 33.6 G/DL (ref 31–36)
MCV RBC AUTO: 84.9 FL (ref 80–100)
PDW BLD-RTO: 12.9 % (ref 12.4–15.4)
PLATELET # BLD: 304 K/UL (ref 135–450)
PMV BLD AUTO: 8.6 FL (ref 5–10.5)
POTASSIUM REFLEX MAGNESIUM: 4.1 MMOL/L (ref 3.5–5.1)
RBC # BLD: 5.88 M/UL (ref 4.2–5.9)
SODIUM BLD-SCNC: 139 MMOL/L (ref 136–145)
WBC # BLD: 8.3 K/UL (ref 4–11)

## 2021-12-05 PROCEDURE — G0378 HOSPITAL OBSERVATION PER HR: HCPCS

## 2021-12-05 PROCEDURE — 6370000000 HC RX 637 (ALT 250 FOR IP): Performed by: INTERNAL MEDICINE

## 2021-12-05 PROCEDURE — 85027 COMPLETE CBC AUTOMATED: CPT

## 2021-12-05 PROCEDURE — 96366 THER/PROPH/DIAG IV INF ADDON: CPT

## 2021-12-05 PROCEDURE — 2580000003 HC RX 258: Performed by: INTERNAL MEDICINE

## 2021-12-05 PROCEDURE — 80048 BASIC METABOLIC PNL TOTAL CA: CPT

## 2021-12-05 PROCEDURE — 96372 THER/PROPH/DIAG INJ SC/IM: CPT

## 2021-12-05 PROCEDURE — 6360000002 HC RX W HCPCS: Performed by: INTERNAL MEDICINE

## 2021-12-05 PROCEDURE — 36415 COLL VENOUS BLD VENIPUNCTURE: CPT

## 2021-12-05 RX ORDER — FLUTICASONE PROPIONATE 50 MCG
2 SPRAY, SUSPENSION (ML) NASAL DAILY
Qty: 16 G | Refills: 0 | Status: SHIPPED | OUTPATIENT
Start: 2021-12-05

## 2021-12-05 RX ORDER — AMOXICILLIN AND CLAVULANATE POTASSIUM 875; 125 MG/1; MG/1
1 TABLET, FILM COATED ORAL 2 TIMES DAILY
Qty: 20 TABLET | Refills: 0 | Status: ON HOLD | OUTPATIENT
Start: 2021-12-05 | End: 2021-12-09 | Stop reason: HOSPADM

## 2021-12-05 RX ORDER — METHYLPREDNISOLONE 4 MG/1
TABLET ORAL
Qty: 1 KIT | Refills: 0 | Status: ON HOLD | OUTPATIENT
Start: 2021-12-05 | End: 2021-12-09 | Stop reason: HOSPADM

## 2021-12-05 RX ADMIN — Medication 10 ML: at 09:33

## 2021-12-05 RX ADMIN — VANCOMYCIN HYDROCHLORIDE 1750 MG: 1 INJECTION, POWDER, LYOPHILIZED, FOR SOLUTION INTRAVENOUS at 09:37

## 2021-12-05 RX ADMIN — METOPROLOL TARTRATE 100 MG: 50 TABLET, FILM COATED ORAL at 09:33

## 2021-12-05 RX ADMIN — ENOXAPARIN SODIUM 40 MG: 100 INJECTION SUBCUTANEOUS at 09:32

## 2021-12-05 RX ADMIN — LEVOTHYROXINE SODIUM 125 MCG: 0.12 TABLET ORAL at 06:28

## 2021-12-05 ASSESSMENT — PAIN SCALES - GENERAL
PAINLEVEL_OUTOF10: 0

## 2021-12-05 NOTE — PROGRESS NOTES
Discharge order noted. Wife upset with communication from staff. RN attempted to explain pt's plan of care moving forward. Pt states he was upset when milk was spilt and no one cleaned it up. RN apologized and attempted to explain plan of care. Pt and wife still upset. Charge nurse and attending notified. Was able to get MD on phone to talk with wife. Pt and wife still not satisfied but insisted on leaving. Wife states she will be \"filing a formal complaint,\" Wife hung up with MD and said, \"What a prick, you all need to get your shit together. \" RN apologized once more. IV removed without complication. Discharge instructions reviewed with patient and wife at bedside. All questions were answered. Medication regimen and side effects reviewed. Instructed patient to  prescriptions from pharmacy. Patient wife at bedside is going to drive him home. The patient ambulated off the unit without complication.

## 2021-12-05 NOTE — DISCHARGE SUMMARY
Hospitalist Discharge Summary     Rupa Foote  : 1970  MRN: [id number]    Admit date: 12/3/2021  Discharge date: 2021    Admitting Physician: Caryl Zambrano MD    Discharge Diagnoses:   Patient Active Problem List   Diagnosis    Low testosterone in male    Fatigue    Hypothyroidism    Insomnia    Depression    Hypertension    AMS (altered mental status)       Admission Condition: fair    Discharged Condition: stable    Discharge Exam:  VITALS:  /82   Pulse 64   Temp 97.6 °F (36.4 °C) (Oral)   Resp 15   Ht 5' 9\" (1.753 m)   Wt 217 lb 12.8 oz (98.8 kg)   SpO2 95%   BMI 32.16 kg/m²   CONSTITUTIONAL:  awake, alert, cooperative, no apparent distress, and appears stated age  EYES:  Lids and lashes normal, PERRL, EOMI, sclera clear, conjunctiva normal  ENT:  NC/AT, MMM    NECK:  Supple, symmetrical, trachea midline, no adenopathy  HEMATOLOGIC/LYMPHATICS:  no cervical, supraclavicular or axillary lymphadenopathy  LUNGS:  clear to auscultation bilaterally, No increased work of breathing, good air exchange, no crackles or wheezing  CARDIOVASCULAR:  Regular rate and rhythm, normal S1 and S2, no S3 or S4, and no significant murmurs, rubs or gallops noted. Normal apical impulse,   ABDOMEN:  Normal active bowel sounds, soft, non-tender, non-distended, no masses palpated, no organomegally  MUSCULOSKELETAL:  Full range of motion noted. NEUROLOGIC:  Awake, alert, oriented to name, place and time. Cranial nerves II-XII are grossly intact. SKIN:  normal skin color, texture, turgor for age. Hospital Course:     Chief Complaint on Admission: Confusion, hearing loss, headache, left facial numbness  Chief diagnosis after evaluation: Hearing loss on the left side, headache, left facial numbness appears to be secondary to chronic sinonasal and mastoid inflammatory disease with acute left eustachian tube dysfunction and effusion per ENT.  Confusion may have been secondary to Cannabinoids detected on his toxicology screen, mastoiditis, associated with his severe headache (possible migraine) at time of initial evaluation, possibly associated with underlying psychiatric illness (admitting physician found no focal neurological deficits and stated, \"Patient is not interested in providing history, seems to be alert awake and able to hold conversations however seems to be trying to avoid conversations and most of the history is being provided by patient's partner at the bedside). Symptoms not consistent with CVA or TIA and CT of the head found no acute intracranial abnormality, CTA of the head found no significant abnormality of the intracranial circulation and CTA of the neck was unremarkable. MRI of the brain had no acute intracranial abnormality. All symptoms resolved with the exception of left sided hearing loss and appropriate treatment and follow up has been provided by ENT. His wife is unhappy that she was not called and updated on his condition. I explained that we do not do that when a patient is A&O unless requested to do so. I spoke with his wife on the phone and she is unhappy with findings and disputes findings of Cannabinoids on Toxicology screen, and stated that she planned to report, \"an incomplete work-up tomorrow,\" and hung up the phone prior to completion of the conversation. Positive Opiate screen can be attributed to administration of Morphine prior to collection of the toxicology sample. Pt denies any diarrhea after admission. Brief Synopsis: Patient 54 y.o. man with a history of ADHD, erectile dysfunction hypertension PTSD who was admitted on 12/3/2021 for evaluation and treatment of confusion, hearing loss. Found to have left mastoiditis and left eustachian tube dysfunction and effusion. Acute encephalopathy - Resolved. Possibly effort dependent. Possibly due to illicit drug abuse.  Toxicology screen was positive for cannabinoids (positive opiate could be attributed to Morphine infarct. There is no evidence of hydrocephalus. ORBITS: The visualized portion of the orbits demonstrate no acute abnormality. SINUSES: There is opacification of the left mastoid air cells consistent with mastoid effusion. Moderate mucosal thickening is present involving the paranasal sinuses diffusely. No air-fluid levels are identified. SOFT TISSUES/SKULL:  No acute abnormality of the visualized skull or soft tissues. No acute intracranial abnormality. Chronic pansinusitis. Prominent left mastoid effusion. XR CHEST PORTABLE    Result Date: 12/3/2021  EXAMINATION: ONE XRAY VIEW OF THE CHEST 12/3/2021 2:41 pm COMPARISON: None. HISTORY: ORDERING SYSTEM PROVIDED HISTORY: other TECHNOLOGIST PROVIDED HISTORY: Reason for exam:->other Reason for Exam: HA,ams, difficulty hearing Acuity: Acute Type of Exam: Initial FINDINGS: Cardiomegaly. No acute airspace disease. No pneumothorax. No pulmonary vascular congestion or edema. Bibasilar hypoaeration. Cardiomegaly Bibasilar hypoaeration     CTA HEAD NECK W CONTRAST    Result Date: 12/3/2021  EXAMINATION: CTA OF THE HEAD AND NECK WITH CONTRAST 12/3/2021 3:35 pm: TECHNIQUE: CTA of the head and neck was performed with the administration of intravenous contrast. Multiplanar reformatted images are provided for review. MIP images are provided for review. Stenosis of the internal carotid arteries measured using NASCET criteria. Dose modulation, iterative reconstruction, and/or weight based adjustment of the mA/kV was utilized to reduce the radiation dose to as low as reasonably achievable. COMPARISON: None.  HISTORY: ORDERING SYSTEM PROVIDED HISTORY: Pain in left neck and head with decreased hearing on the left, altered mental status, poor historian TECHNOLOGIST PROVIDED HISTORY: Reason for exam:->Pain in left neck and head with decreased hearing on the left, altered mental status, poor historian Decision Support Exception - unselect if not a suspected or confirmed emergency medical condition->Emergency Medical Condition (MA) Reason for Exam: Pain in left neck and head with decreased hearing on the left, altered mental status, poor historian Acuity: Acute Type of Exam: Initial FINDINGS: CTA NECK: AORTIC ARCH/ARCH VESSELS: There is no abnormality of the vessels as they originate from the aortic arch. CAROTID ARTERIES: The common carotid arteries are patent bilaterally without any narrowing or stenosis. The internal carotid arteries are patent bilaterally without narrowing or stenosis. VERTEBRAL ARTERIES: The vertebral arteries are codominant and patent in their visualized course without narrowing or stenosis. SOFT TISSUES: There are patchy airspace changes in the lung apices which are concerning for infiltrates. CT of the chest could further evaluate. There are small lymph nodes scattered throughout the neck which are nonspecific and may be reactive. Clinically correlate. BONES: No acute osseous abnormality. CTA HEAD: ANTERIOR CIRCULATION:  No abnormality of the internal carotid arteries, middle cerebral arteries, or anterior cerebral arteries are identified. POSTERIOR CIRCULATION: There is a prominent posterior communicating artery on the right or fetal origin of the right posterior cerebral artery. The distal basilar and left posterior cerebral artery are patent. No obvious aneurysm is identified. OTHER: No dural venous sinus thrombosis on this non-dedicated study. BRAIN: Refer to the CT head of the same date. Unremarkable CTA of the neck. Prominent right posterior communicating artery or fetal origin of the right posterior cerebral artery which is a normal anatomic variant. No significant abnormality of the intracranial circulation. Patchy airspace changes in the lung apices concerning for infiltrates. CT could better evaluate lung parenchyma if indicated.      MRI BRAIN WO CONTRAST    Result Date: 12/4/2021  EXAMINATION: MRI OF THE BRAIN WITHOUT CONTRAST  12/4/2021 2:27 pm TECHNIQUE: Multiplanar multisequence MRI of the brain was performed without the administration of intravenous contrast. COMPARISON: 12/03/2021 HISTORY: ORDERING SYSTEM PROVIDED HISTORY: CVA? Left Hearing loss TECHNOLOGIST PROVIDED HISTORY: Reason for exam:->CVA? Left Hearing loss Reason for Exam: CVA? Left Hearing loss Acuity: Acute Type of Exam: Subsequent/Follow-up Additional signs and symptoms:  Patient is a 82-year-old male with past medical history of ADHD, erectile dysfunction hypertension PTSD who presented to hospital for left-sided head and neck pain and change in mental status. Patient is not interested in providing history, seems to be alert awake and able to hold conversations however seems to be trying to avoid conversations and most of the history is being provided by patient's partner at the bedside. According to the patient significant other, patient did have numbness of left side of his face diarrhea started yesterday, he also had difficulty hearing and he lost his hearing yesterday otherwise no reported fevers chills nausea vomiting diarrhea constipation dysuria FINDINGS: INTRACRANIAL STRUCTURES/VENTRICLES: There is no acute infarct. No mass effect or midline shift. No evidence of an acute intracranial hemorrhage. The ventricles and sulci are normal in size and configuration. The sellar/suprasellar regions appear unremarkable. The normal signal voids within the major intracranial vessels appear maintained. There are scattered periventricular and deep subcortical white matter T2/FLAIR hyperintensities, consistent with microangiopathic change. There is mild parenchymal volume loss. ORBITS: The visualized portion of the orbits demonstrate no acute abnormality. SINUSES: Scattered mucosal thickening in the paranasal sinuses. Left mastoid air cell effusion. BONES/SOFT TISSUES: The bone marrow signal intensity appears normal. The soft tissues demonstrate no acute abnormality.      1. No acute intracranial abnormality. No acute infarction. 2. Left mastoid air cell effusion, which could be seen with mastoiditis. Clinical correlation is recommended. Other Significant Diagnostic Studies: As described above    Treatments: As described above    Disposition: home    Discharge Medications:    See discharge med rec    35 Minutes spent on patient evaluation, counseling and discharge planning.      Signed:  Lubna Wayne MD  12/5/2021, 2:03 PM

## 2021-12-05 NOTE — PLAN OF CARE
Problem: Pain:  Goal: Pain level will decrease  Description: Pain level will decrease  Outcome: Completed  Goal: Control of acute pain  Description: Control of acute pain  Outcome: Completed  Goal: Control of chronic pain  Description: Control of chronic pain  Outcome: Completed  Goal: Patient's pain/discomfort is manageable  Description: Patient's pain/discomfort is manageable  Outcome: Completed     Problem: Falls - Risk of:  Goal: Will remain free from falls  Description: Will remain free from falls  Outcome: Completed  Goal: Absence of physical injury  Description: Absence of physical injury  Outcome: Completed     Problem: Infection:  Goal: Will remain free from infection  Description: Will remain free from infection  Outcome: Completed     Problem: Safety:  Goal: Free from accidental physical injury  Description: Free from accidental physical injury  Outcome: Completed  Goal: Free from intentional harm  Description: Free from intentional harm  Outcome: Completed     Problem: Daily Care:  Goal: Daily care needs are met  Description: Daily care needs are met  Outcome: Completed     Problem: Skin Integrity:  Goal: Skin integrity will stabilize  Description: Skin integrity will stabilize  Outcome: Completed     Problem: Discharge Planning:  Goal: Patients continuum of care needs are met  Description: Patients continuum of care needs are met  Outcome: Completed     Problem: HEMODYNAMIC STATUS  Goal: Patient has stable vital signs and fluid balance  Outcome: Completed     Problem: ACTIVITY INTOLERANCE/IMPAIRED MOBILITY  Goal: Mobility/activity is maintained at optimum level for patient  Outcome: Completed     Problem: COMMUNICATION IMPAIRMENT  Goal: Ability to express needs and understand communication  Outcome: Completed

## 2021-12-06 ENCOUNTER — HOSPITAL ENCOUNTER (INPATIENT)
Age: 51
LOS: 2 days | Discharge: HOME | DRG: 720 | End: 2021-12-09
Attending: EMERGENCY MEDICINE | Admitting: INTERNAL MEDICINE
Payer: COMMERCIAL

## 2021-12-06 ENCOUNTER — APPOINTMENT (OUTPATIENT)
Dept: CT IMAGING | Age: 51
DRG: 720 | End: 2021-12-06
Payer: COMMERCIAL

## 2021-12-06 ENCOUNTER — APPOINTMENT (OUTPATIENT)
Dept: GENERAL RADIOLOGY | Age: 51
DRG: 720 | End: 2021-12-06
Payer: COMMERCIAL

## 2021-12-06 DIAGNOSIS — R77.8 ELEVATED TROPONIN: ICD-10-CM

## 2021-12-06 DIAGNOSIS — E08.10 DIABETIC KETOACIDOSIS WITHOUT COMA ASSOCIATED WITH DIABETES MELLITUS DUE TO UNDERLYING CONDITION (HCC): ICD-10-CM

## 2021-12-06 DIAGNOSIS — N17.9 ACUTE KIDNEY INJURY (HCC): ICD-10-CM

## 2021-12-06 DIAGNOSIS — E87.6 HYPOKALEMIA: ICD-10-CM

## 2021-12-06 DIAGNOSIS — A41.9 SEPTICEMIA (HCC): Primary | ICD-10-CM

## 2021-12-06 LAB
A/G RATIO: 1.4 (ref 1.1–2.2)
ALBUMIN SERPL-MCNC: 4.2 G/DL (ref 3.4–5)
ALP BLD-CCNC: 107 U/L (ref 40–129)
ALT SERPL-CCNC: 41 U/L (ref 10–40)
ANION GAP SERPL CALCULATED.3IONS-SCNC: 25 MMOL/L (ref 3–16)
AST SERPL-CCNC: 30 U/L (ref 15–37)
BASOPHILS ABSOLUTE: 0 K/UL (ref 0–0.2)
BASOPHILS RELATIVE PERCENT: 0 %
BILIRUB SERPL-MCNC: 0.3 MG/DL (ref 0–1)
BUN BLDV-MCNC: 18 MG/DL (ref 7–20)
CALCIUM SERPL-MCNC: 9.1 MG/DL (ref 8.3–10.6)
CHLORIDE BLD-SCNC: 95 MMOL/L (ref 99–110)
CO2: 14 MMOL/L (ref 21–32)
CREAT SERPL-MCNC: 1.8 MG/DL (ref 0.9–1.3)
EOSINOPHILS ABSOLUTE: 0 K/UL (ref 0–0.6)
EOSINOPHILS RELATIVE PERCENT: 0 %
GFR AFRICAN AMERICAN: 49
GFR NON-AFRICAN AMERICAN: 40
GLUCOSE BLD-MCNC: 255 MG/DL (ref 70–99)
HCT VFR BLD CALC: 53.6 % (ref 40.5–52.5)
HEMOGLOBIN: 17.4 G/DL (ref 13.5–17.5)
LACTIC ACID: 9.6 MMOL/L (ref 0.4–2)
LYMPHOCYTES ABSOLUTE: 1.1 K/UL (ref 1–5.1)
LYMPHOCYTES RELATIVE PERCENT: 7 %
MAGNESIUM: 2.3 MG/DL (ref 1.8–2.4)
MCH RBC QN AUTO: 28 PG (ref 26–34)
MCHC RBC AUTO-ENTMCNC: 32.5 G/DL (ref 31–36)
MCV RBC AUTO: 86.2 FL (ref 80–100)
MONOCYTES ABSOLUTE: 1.6 K/UL (ref 0–1.3)
MONOCYTES RELATIVE PERCENT: 10 %
NEUTROPHILS ABSOLUTE: 13.6 K/UL (ref 1.7–7.7)
NEUTROPHILS RELATIVE PERCENT: 83 %
PDW BLD-RTO: 13 % (ref 12.4–15.4)
PLATELET # BLD: 326 K/UL (ref 135–450)
PLATELET SLIDE REVIEW: ADEQUATE
PMV BLD AUTO: 8.8 FL (ref 5–10.5)
POTASSIUM REFLEX MAGNESIUM: 2.4 MMOL/L (ref 3.5–5.1)
PRO-BNP: 176 PG/ML (ref 0–124)
PROCALCITONIN: 0.03 NG/ML (ref 0–0.15)
RBC # BLD: 6.22 M/UL (ref 4.2–5.9)
SLIDE REVIEW: ABNORMAL
SODIUM BLD-SCNC: 134 MMOL/L (ref 136–145)
TOTAL PROTEIN: 7.3 G/DL (ref 6.4–8.2)
TROPONIN: 0.07 NG/ML
WBC # BLD: 16.4 K/UL (ref 4–11)

## 2021-12-06 PROCEDURE — 80053 COMPREHEN METABOLIC PANEL: CPT

## 2021-12-06 PROCEDURE — 85025 COMPLETE CBC W/AUTO DIFF WBC: CPT

## 2021-12-06 PROCEDURE — 2580000003 HC RX 258: Performed by: EMERGENCY MEDICINE

## 2021-12-06 PROCEDURE — 83735 ASSAY OF MAGNESIUM: CPT

## 2021-12-06 PROCEDURE — 84484 ASSAY OF TROPONIN QUANT: CPT

## 2021-12-06 PROCEDURE — 96375 TX/PRO/DX INJ NEW DRUG ADDON: CPT

## 2021-12-06 PROCEDURE — 83605 ASSAY OF LACTIC ACID: CPT

## 2021-12-06 PROCEDURE — 6360000002 HC RX W HCPCS: Performed by: EMERGENCY MEDICINE

## 2021-12-06 PROCEDURE — 6360000002 HC RX W HCPCS: Performed by: NURSE PRACTITIONER

## 2021-12-06 PROCEDURE — 36415 COLL VENOUS BLD VENIPUNCTURE: CPT

## 2021-12-06 PROCEDURE — 71046 X-RAY EXAM CHEST 2 VIEWS: CPT

## 2021-12-06 PROCEDURE — 84145 PROCALCITONIN (PCT): CPT

## 2021-12-06 PROCEDURE — 93005 ELECTROCARDIOGRAM TRACING: CPT | Performed by: EMERGENCY MEDICINE

## 2021-12-06 PROCEDURE — 70450 CT HEAD/BRAIN W/O DYE: CPT

## 2021-12-06 PROCEDURE — 87040 BLOOD CULTURE FOR BACTERIA: CPT

## 2021-12-06 PROCEDURE — 96365 THER/PROPH/DIAG IV INF INIT: CPT

## 2021-12-06 PROCEDURE — 82550 ASSAY OF CK (CPK): CPT

## 2021-12-06 PROCEDURE — 6370000000 HC RX 637 (ALT 250 FOR IP): Performed by: NURSE PRACTITIONER

## 2021-12-06 PROCEDURE — 83880 ASSAY OF NATRIURETIC PEPTIDE: CPT

## 2021-12-06 RX ORDER — ONDANSETRON 2 MG/ML
4 INJECTION INTRAMUSCULAR; INTRAVENOUS ONCE
Status: COMPLETED | OUTPATIENT
Start: 2021-12-06 | End: 2021-12-06

## 2021-12-06 RX ORDER — 0.9 % SODIUM CHLORIDE 0.9 %
1000 INTRAVENOUS SOLUTION INTRAVENOUS ONCE
Status: COMPLETED | OUTPATIENT
Start: 2021-12-06 | End: 2021-12-07

## 2021-12-06 RX ORDER — NICOTINE POLACRILEX 4 MG
15 LOZENGE BUCCAL PRN
Status: DISCONTINUED | OUTPATIENT
Start: 2021-12-06 | End: 2021-12-07

## 2021-12-06 RX ORDER — LORAZEPAM 2 MG/ML
1 INJECTION INTRAMUSCULAR ONCE
Status: COMPLETED | OUTPATIENT
Start: 2021-12-06 | End: 2021-12-06

## 2021-12-06 RX ORDER — POTASSIUM CHLORIDE 7.45 MG/ML
10 INJECTION INTRAVENOUS PRN
Status: DISCONTINUED | OUTPATIENT
Start: 2021-12-06 | End: 2021-12-09 | Stop reason: HOSPADM

## 2021-12-06 RX ORDER — DEXTROSE MONOHYDRATE 25 G/50ML
12.5 INJECTION, SOLUTION INTRAVENOUS PRN
Status: DISCONTINUED | OUTPATIENT
Start: 2021-12-06 | End: 2021-12-07

## 2021-12-06 RX ORDER — ACETAMINOPHEN 500 MG
1000 TABLET ORAL ONCE
Status: COMPLETED | OUTPATIENT
Start: 2021-12-06 | End: 2021-12-06

## 2021-12-06 RX ORDER — DEXTROSE MONOHYDRATE 50 MG/ML
100 INJECTION, SOLUTION INTRAVENOUS PRN
Status: DISCONTINUED | OUTPATIENT
Start: 2021-12-06 | End: 2021-12-07

## 2021-12-06 RX ADMIN — LORAZEPAM 1 MG: 2 INJECTION INTRAMUSCULAR; INTRAVENOUS at 23:11

## 2021-12-06 RX ADMIN — ONDANSETRON 4 MG: 2 INJECTION INTRAMUSCULAR; INTRAVENOUS at 23:11

## 2021-12-06 RX ADMIN — SODIUM CHLORIDE 1000 ML: 9 INJECTION, SOLUTION INTRAVENOUS at 23:12

## 2021-12-06 RX ADMIN — ACETAMINOPHEN 1000 MG: 500 TABLET ORAL at 22:38

## 2021-12-06 RX ADMIN — CEFTRIAXONE 2000 MG: 2 INJECTION, POWDER, FOR SOLUTION INTRAMUSCULAR; INTRAVENOUS at 23:12

## 2021-12-06 ASSESSMENT — PAIN DESCRIPTION - PAIN TYPE: TYPE: ACUTE PAIN

## 2021-12-06 ASSESSMENT — PAIN DESCRIPTION - LOCATION: LOCATION: NECK

## 2021-12-06 ASSESSMENT — PAIN SCALES - GENERAL: PAINLEVEL_OUTOF10: 9

## 2021-12-07 PROBLEM — A41.9 SEPSIS (HCC): Status: ACTIVE | Noted: 2021-12-07

## 2021-12-07 LAB
ANION GAP SERPL CALCULATED.3IONS-SCNC: 15 MMOL/L (ref 3–16)
ANION GAP SERPL CALCULATED.3IONS-SCNC: 17 MMOL/L (ref 3–16)
ANION GAP SERPL CALCULATED.3IONS-SCNC: 20 MMOL/L (ref 3–16)
BILIRUBIN URINE: ABNORMAL
BLOOD CULTURE, ROUTINE: NORMAL
BLOOD, URINE: NEGATIVE
BUN BLDV-MCNC: 13 MG/DL (ref 7–20)
BUN BLDV-MCNC: 8 MG/DL (ref 7–20)
BUN BLDV-MCNC: 9 MG/DL (ref 7–20)
CALCIUM SERPL-MCNC: 7.9 MG/DL (ref 8.3–10.6)
CALCIUM SERPL-MCNC: 8.4 MG/DL (ref 8.3–10.6)
CALCIUM SERPL-MCNC: 8.4 MG/DL (ref 8.3–10.6)
CHLORIDE BLD-SCNC: 103 MMOL/L (ref 99–110)
CHLORIDE BLD-SCNC: 103 MMOL/L (ref 99–110)
CHLORIDE BLD-SCNC: 107 MMOL/L (ref 99–110)
CLARITY: ABNORMAL
CO2: 16 MMOL/L (ref 21–32)
CO2: 16 MMOL/L (ref 21–32)
CO2: 17 MMOL/L (ref 21–32)
COLOR: ABNORMAL
CORTISOL TOTAL: 2.2 UG/DL
CREAT SERPL-MCNC: 0.7 MG/DL (ref 0.9–1.3)
CREAT SERPL-MCNC: 0.8 MG/DL (ref 0.9–1.3)
CREAT SERPL-MCNC: 1.1 MG/DL (ref 0.9–1.3)
CREATININE URINE: 390.5 MG/DL (ref 39–259)
CRYSTALS, UA: ABNORMAL /HPF
CULTURE, BLOOD 2: NORMAL
EKG ATRIAL RATE: 122 BPM
EKG DIAGNOSIS: NORMAL
EKG P AXIS: 55 DEGREES
EKG P-R INTERVAL: 134 MS
EKG Q-T INTERVAL: 356 MS
EKG QRS DURATION: 102 MS
EKG QTC CALCULATION (BAZETT): 507 MS
EKG R AXIS: 29 DEGREES
EKG T AXIS: -14 DEGREES
EKG VENTRICULAR RATE: 122 BPM
EPITHELIAL CELLS, UA: 16 /HPF (ref 0–5)
ESTIMATED AVERAGE GLUCOSE: 119.8 MG/DL
ETHANOL: NORMAL MG/DL (ref 0–0.08)
GFR AFRICAN AMERICAN: >60
GFR NON-AFRICAN AMERICAN: >60
GLUCOSE BLD-MCNC: 137 MG/DL (ref 70–99)
GLUCOSE BLD-MCNC: 138 MG/DL (ref 70–99)
GLUCOSE BLD-MCNC: 167 MG/DL (ref 70–99)
GLUCOSE BLD-MCNC: 175 MG/DL (ref 70–99)
GLUCOSE BLD-MCNC: 180 MG/DL (ref 70–99)
GLUCOSE BLD-MCNC: 192 MG/DL (ref 70–99)
GLUCOSE BLD-MCNC: 194 MG/DL (ref 70–99)
GLUCOSE BLD-MCNC: 195 MG/DL (ref 70–99)
GLUCOSE BLD-MCNC: 206 MG/DL (ref 70–99)
GLUCOSE BLD-MCNC: 210 MG/DL (ref 70–99)
GLUCOSE BLD-MCNC: 88 MG/DL (ref 70–99)
GLUCOSE BLD-MCNC: 92 MG/DL (ref 70–99)
GLUCOSE BLD-MCNC: 95 MG/DL (ref 70–99)
GLUCOSE URINE: 500 MG/DL
HBA1C MFR BLD: 5.8 %
HYALINE CASTS: ABNORMAL /LPF (ref 0–2)
KETONES, URINE: ABNORMAL MG/DL
LACTIC ACID: 1.3 MMOL/L (ref 0.4–2)
LACTIC ACID: 3.4 MMOL/L (ref 0.4–2)
LACTIC ACID: 6.1 MMOL/L (ref 0.4–2)
LACTIC ACID: 7.6 MMOL/L (ref 0.4–2)
LEUKOCYTE ESTERASE, URINE: NEGATIVE
MAGNESIUM: 1.9 MG/DL (ref 1.8–2.4)
MAGNESIUM: 2 MG/DL (ref 1.8–2.4)
MAGNESIUM: 2 MG/DL (ref 1.8–2.4)
MAGNESIUM: 2.2 MG/DL (ref 1.8–2.4)
MICROSCOPIC EXAMINATION: YES
NITRITE, URINE: NEGATIVE
OSMOLALITY URINE: 734 MOSM/KG (ref 390–1070)
PERFORMED ON: ABNORMAL
PERFORMED ON: NORMAL
PERFORMED ON: NORMAL
PH UA: 5 (ref 5–8)
PHOSPHORUS: 1.1 MG/DL (ref 2.5–4.9)
PHOSPHORUS: 1.8 MG/DL (ref 2.5–4.9)
PHOSPHORUS: 1.9 MG/DL (ref 2.5–4.9)
POTASSIUM SERPL-SCNC: 3 MMOL/L (ref 3.5–5.1)
POTASSIUM SERPL-SCNC: 3.4 MMOL/L (ref 3.5–5.1)
POTASSIUM SERPL-SCNC: 3.6 MMOL/L (ref 3.5–5.1)
PROTEIN UA: 30 MG/DL
RBC UA: ABNORMAL /HPF (ref 0–4)
RENAL EPITHELIAL, UA: ABNORMAL /HPF (ref 0–1)
SODIUM BLD-SCNC: 137 MMOL/L (ref 136–145)
SODIUM BLD-SCNC: 138 MMOL/L (ref 136–145)
SODIUM BLD-SCNC: 139 MMOL/L (ref 136–145)
SODIUM URINE: 69 MMOL/L
SPECIFIC GRAVITY UA: 1.03 (ref 1–1.03)
TOTAL CK: 240 U/L (ref 39–308)
TSH REFLEX: 3.47 UIU/ML (ref 0.27–4.2)
URIC ACID, SERUM: 7.3 MG/DL (ref 3.5–7.2)
URINE REFLEX TO CULTURE: ABNORMAL
URINE TYPE: ABNORMAL
UROBILINOGEN, URINE: 0.2 E.U./DL
WBC UA: 8 /HPF (ref 0–5)

## 2021-12-07 PROCEDURE — 2580000003 HC RX 258: Performed by: INTERNAL MEDICINE

## 2021-12-07 PROCEDURE — 80048 BASIC METABOLIC PNL TOTAL CA: CPT

## 2021-12-07 PROCEDURE — 82077 ASSAY SPEC XCP UR&BREATH IA: CPT

## 2021-12-07 PROCEDURE — 6360000002 HC RX W HCPCS: Performed by: INTERNAL MEDICINE

## 2021-12-07 PROCEDURE — 6370000000 HC RX 637 (ALT 250 FOR IP): Performed by: HOSPITALIST

## 2021-12-07 PROCEDURE — 94761 N-INVAS EAR/PLS OXIMETRY MLT: CPT

## 2021-12-07 PROCEDURE — 2580000003 HC RX 258: Performed by: HOSPITALIST

## 2021-12-07 PROCEDURE — 2500000003 HC RX 250 WO HCPCS: Performed by: HOSPITALIST

## 2021-12-07 PROCEDURE — 81001 URINALYSIS AUTO W/SCOPE: CPT

## 2021-12-07 PROCEDURE — 83605 ASSAY OF LACTIC ACID: CPT

## 2021-12-07 PROCEDURE — 96375 TX/PRO/DX INJ NEW DRUG ADDON: CPT

## 2021-12-07 PROCEDURE — 99223 1ST HOSP IP/OBS HIGH 75: CPT | Performed by: HOSPITALIST

## 2021-12-07 PROCEDURE — 2580000003 HC RX 258: Performed by: EMERGENCY MEDICINE

## 2021-12-07 PROCEDURE — 84300 ASSAY OF URINE SODIUM: CPT

## 2021-12-07 PROCEDURE — 84550 ASSAY OF BLOOD/URIC ACID: CPT

## 2021-12-07 PROCEDURE — 1200000000 HC SEMI PRIVATE

## 2021-12-07 PROCEDURE — 96365 THER/PROPH/DIAG IV INF INIT: CPT

## 2021-12-07 PROCEDURE — 83735 ASSAY OF MAGNESIUM: CPT

## 2021-12-07 PROCEDURE — 82570 ASSAY OF URINE CREATININE: CPT

## 2021-12-07 PROCEDURE — 6370000000 HC RX 637 (ALT 250 FOR IP): Performed by: INTERNAL MEDICINE

## 2021-12-07 PROCEDURE — 84443 ASSAY THYROID STIM HORMONE: CPT

## 2021-12-07 PROCEDURE — 83036 HEMOGLOBIN GLYCOSYLATED A1C: CPT

## 2021-12-07 PROCEDURE — 93010 ELECTROCARDIOGRAM REPORT: CPT | Performed by: INTERNAL MEDICINE

## 2021-12-07 PROCEDURE — 99285 EMERGENCY DEPT VISIT HI MDM: CPT

## 2021-12-07 PROCEDURE — 96367 TX/PROPH/DG ADDL SEQ IV INF: CPT

## 2021-12-07 PROCEDURE — 82533 TOTAL CORTISOL: CPT

## 2021-12-07 PROCEDURE — 99255 IP/OBS CONSLTJ NEW/EST HI 80: CPT | Performed by: INTERNAL MEDICINE

## 2021-12-07 PROCEDURE — 6360000002 HC RX W HCPCS: Performed by: NURSE PRACTITIONER

## 2021-12-07 PROCEDURE — 6360000002 HC RX W HCPCS: Performed by: EMERGENCY MEDICINE

## 2021-12-07 PROCEDURE — 36415 COLL VENOUS BLD VENIPUNCTURE: CPT

## 2021-12-07 PROCEDURE — 84100 ASSAY OF PHOSPHORUS: CPT

## 2021-12-07 PROCEDURE — 83935 ASSAY OF URINE OSMOLALITY: CPT

## 2021-12-07 RX ORDER — ACETAMINOPHEN 325 MG/1
650 TABLET ORAL EVERY 4 HOURS PRN
Status: DISCONTINUED | OUTPATIENT
Start: 2021-12-07 | End: 2021-12-09 | Stop reason: HOSPADM

## 2021-12-07 RX ORDER — POTASSIUM CHLORIDE 7.45 MG/ML
10 INJECTION INTRAVENOUS PRN
Status: DISCONTINUED | OUTPATIENT
Start: 2021-12-07 | End: 2021-12-09 | Stop reason: HOSPADM

## 2021-12-07 RX ORDER — DEXTROSE AND SODIUM CHLORIDE 5; .45 G/100ML; G/100ML
INJECTION, SOLUTION INTRAVENOUS CONTINUOUS PRN
Status: DISCONTINUED | OUTPATIENT
Start: 2021-12-07 | End: 2021-12-09 | Stop reason: HOSPADM

## 2021-12-07 RX ORDER — LORAZEPAM 2 MG/ML
2 INJECTION INTRAMUSCULAR ONCE
Status: COMPLETED | OUTPATIENT
Start: 2021-12-07 | End: 2021-12-07

## 2021-12-07 RX ORDER — LANOLIN ALCOHOL/MO/W.PET/CERES
6 CREAM (GRAM) TOPICAL NIGHTLY PRN
Status: DISCONTINUED | OUTPATIENT
Start: 2021-11-07 | End: 2021-12-09 | Stop reason: HOSPADM

## 2021-12-07 RX ORDER — DEXTROSE MONOHYDRATE 25 G/50ML
12.5 INJECTION, SOLUTION INTRAVENOUS PRN
Status: DISCONTINUED | OUTPATIENT
Start: 2021-12-07 | End: 2021-12-09 | Stop reason: HOSPADM

## 2021-12-07 RX ORDER — METOPROLOL TARTRATE 50 MG/1
100 TABLET, FILM COATED ORAL 2 TIMES DAILY
Status: DISCONTINUED | OUTPATIENT
Start: 2021-12-07 | End: 2021-12-09 | Stop reason: HOSPADM

## 2021-12-07 RX ORDER — HYDROXYZINE PAMOATE 25 MG/1
50 CAPSULE ORAL ONCE
Status: DISCONTINUED | OUTPATIENT
Start: 2021-12-07 | End: 2021-12-09 | Stop reason: HOSPADM

## 2021-12-07 RX ORDER — HYDROXYZINE HYDROCHLORIDE 10 MG/1
25 TABLET, FILM COATED ORAL 3 TIMES DAILY PRN
Status: DISCONTINUED | OUTPATIENT
Start: 2021-12-07 | End: 2021-12-09 | Stop reason: HOSPADM

## 2021-12-07 RX ORDER — POLYETHYLENE GLYCOL 3350 17 G/17G
17 POWDER, FOR SOLUTION ORAL DAILY PRN
Status: DISCONTINUED | OUTPATIENT
Start: 2021-12-07 | End: 2021-12-09 | Stop reason: HOSPADM

## 2021-12-07 RX ORDER — LEVOTHYROXINE SODIUM 0.12 MG/1
125 TABLET ORAL DAILY
Status: DISCONTINUED | OUTPATIENT
Start: 2021-12-07 | End: 2021-12-09 | Stop reason: HOSPADM

## 2021-12-07 RX ORDER — FLUTICASONE PROPIONATE 50 MCG
2 SPRAY, SUSPENSION (ML) NASAL DAILY
Status: DISCONTINUED | OUTPATIENT
Start: 2021-12-07 | End: 2021-12-09 | Stop reason: HOSPADM

## 2021-12-07 RX ORDER — SODIUM CHLORIDE 450 MG/100ML
INJECTION, SOLUTION INTRAVENOUS CONTINUOUS
Status: DISCONTINUED | OUTPATIENT
Start: 2021-12-07 | End: 2021-12-07

## 2021-12-07 RX ORDER — DEXAMETHASONE SODIUM PHOSPHATE 10 MG/ML
10 INJECTION INTRAMUSCULAR; INTRAVENOUS ONCE
Status: COMPLETED | OUTPATIENT
Start: 2021-12-07 | End: 2021-12-07

## 2021-12-07 RX ORDER — 0.9 % SODIUM CHLORIDE 0.9 %
1000 INTRAVENOUS SOLUTION INTRAVENOUS ONCE
Status: COMPLETED | OUTPATIENT
Start: 2021-12-07 | End: 2021-12-07

## 2021-12-07 RX ORDER — MAGNESIUM SULFATE 1 G/100ML
1000 INJECTION INTRAVENOUS PRN
Status: DISCONTINUED | OUTPATIENT
Start: 2021-12-07 | End: 2021-12-09 | Stop reason: HOSPADM

## 2021-12-07 RX ADMIN — METOPROLOL TARTRATE 100 MG: 50 TABLET, FILM COATED ORAL at 22:02

## 2021-12-07 RX ADMIN — SODIUM CHLORIDE 1000 ML: 9 INJECTION, SOLUTION INTRAVENOUS at 00:42

## 2021-12-07 RX ADMIN — Medication 10 MEQ: at 03:37

## 2021-12-07 RX ADMIN — METOPROLOL TARTRATE 100 MG: 50 TABLET, FILM COATED ORAL at 09:21

## 2021-12-07 RX ADMIN — SODIUM BICARBONATE: 84 INJECTION, SOLUTION INTRAVENOUS at 23:10

## 2021-12-07 RX ADMIN — Medication 10 MEQ: at 04:40

## 2021-12-07 RX ADMIN — LEVOTHYROXINE SODIUM 125 MCG: 0.12 TABLET ORAL at 06:23

## 2021-12-07 RX ADMIN — Medication 10 MEQ: at 00:55

## 2021-12-07 RX ADMIN — Medication 10 MEQ: at 11:38

## 2021-12-07 RX ADMIN — HYDROXYZINE HYDROCHLORIDE 25 MG: 10 TABLET ORAL at 16:52

## 2021-12-07 RX ADMIN — FLUTICASONE PROPIONATE 2 SPRAY: 50 SPRAY, METERED NASAL at 09:21

## 2021-12-07 RX ADMIN — DEXAMETHASONE SODIUM PHOSPHATE 10 MG: 10 INJECTION INTRAMUSCULAR; INTRAVENOUS at 00:43

## 2021-12-07 RX ADMIN — ENOXAPARIN SODIUM 40 MG: 100 INJECTION SUBCUTANEOUS at 09:21

## 2021-12-07 RX ADMIN — Medication 1500 MG: at 09:50

## 2021-12-07 RX ADMIN — DEXTROSE AND SODIUM CHLORIDE: 5; 450 INJECTION, SOLUTION INTRAVENOUS at 05:21

## 2021-12-07 RX ADMIN — Medication 10 MEQ: at 02:16

## 2021-12-07 RX ADMIN — LORAZEPAM 2 MG: 2 INJECTION INTRAMUSCULAR; INTRAVENOUS at 00:50

## 2021-12-07 RX ADMIN — Medication 10 MEQ: at 13:34

## 2021-12-07 RX ADMIN — DEXTROSE AND SODIUM CHLORIDE: 5; 450 INJECTION, SOLUTION INTRAVENOUS at 12:05

## 2021-12-07 RX ADMIN — Medication 10 MEQ: at 00:02

## 2021-12-07 RX ADMIN — SODIUM BICARBONATE: 84 INJECTION, SOLUTION INTRAVENOUS at 15:12

## 2021-12-07 RX ADMIN — SODIUM CHLORIDE 0.04 UNITS/KG/HR: 9 INJECTION, SOLUTION INTRAVENOUS at 05:25

## 2021-12-07 RX ADMIN — SODIUM CHLORIDE 1000 ML: 9 INJECTION, SOLUTION INTRAVENOUS at 00:02

## 2021-12-07 RX ADMIN — Medication 1500 MG: at 22:01

## 2021-12-07 RX ADMIN — POTASSIUM PHOSPHATE, MONOBASIC AND POTASSIUM PHOSPHATE, DIBASIC 20 MMOL: 224; 236 INJECTION, SOLUTION, CONCENTRATE INTRAVENOUS at 11:26

## 2021-12-07 RX ADMIN — SODIUM CHLORIDE 1000 ML: 9 INJECTION, SOLUTION INTRAVENOUS at 02:10

## 2021-12-07 RX ADMIN — Medication 10 MEQ: at 06:35

## 2021-12-07 ASSESSMENT — ENCOUNTER SYMPTOMS
BLOOD IN STOOL: 0
EYE DISCHARGE: 0
EYE ITCHING: 0
DIARRHEA: 0
APNEA: 0
EYE REDNESS: 0
CHOKING: 0
RECTAL PAIN: 0
NAUSEA: 0
COUGH: 0
ABDOMINAL DISTENTION: 0
EYE PAIN: 0
PHOTOPHOBIA: 0
CHEST TIGHTNESS: 0
CONSTIPATION: 0
STRIDOR: 0
ANAL BLEEDING: 0
ABDOMINAL PAIN: 0
COLOR CHANGE: 0
WHEEZING: 0
VOMITING: 0
SHORTNESS OF BREATH: 0
BACK PAIN: 0

## 2021-12-07 ASSESSMENT — PAIN SCALES - GENERAL
PAINLEVEL_OUTOF10: 0
PAINLEVEL_OUTOF10: 4
PAINLEVEL_OUTOF10: 0

## 2021-12-07 ASSESSMENT — PAIN DESCRIPTION - LOCATION: LOCATION: KNEE

## 2021-12-07 ASSESSMENT — PAIN DESCRIPTION - PAIN TYPE: TYPE: ACUTE PAIN

## 2021-12-07 ASSESSMENT — PAIN - FUNCTIONAL ASSESSMENT: PAIN_FUNCTIONAL_ASSESSMENT: ACTIVITIES ARE NOT PREVENTED

## 2021-12-07 ASSESSMENT — PAIN DESCRIPTION - DESCRIPTORS: DESCRIPTORS: ACHING

## 2021-12-07 ASSESSMENT — PAIN DESCRIPTION - ORIENTATION: ORIENTATION: RIGHT

## 2021-12-07 NOTE — CONSULTS
Clinical Pharmacy Note  Vancomycin Consult    Bert Patel is a 54 y.o. male ordered Vancomycin for sepsis; consult received from Dr. Radha Tierney to manage therapy. Also receiving ceftriaxone. Patient Active Problem List   Diagnosis    Low testosterone in male    Fatigue    Hypothyroidism    Insomnia    Depression    Hypertension    AMS (altered mental status)    Sepsis ([address]       Allergies:  Bactrim [sulfamethoxazole-trimethoprim]     Temp max:  Temp (24hrs), Av.9 °F (36.6 °C), Min:97.9 °F (36.6 °C), Max:97.9 °F (36.6 °C)      Recent Labs     21  0904 21  0644 21  2151   WBC 9.5 8.3 16.4*       Recent Labs     21  0644 21  2151 21  0328   BUN 15 18 13   CREATININE 0.9 1.8* 1.1       No intake or output data in the 24 hours ending 21 0518      Ht Readings from Last 1 Encounters:   21 5' 9\" (1.753 m)        Wt Readings from Last 1 Encounters:   21 217 lb 2.5 oz (98.5 kg)         Estimated Creatinine Clearance: 94 mL/min (based on SCr of 1.1 mg/dL). Assessment/Plan:  Vancomycin 1000 mg IV x 1 given in the ED. Will order Vancomycin 1500 mg every 12 hours  Regimen projects a trough level of ~15 mg/L. Will follow levels carefully in light of ALFREDO on admission. Level ordered for 21 at 2000. Thank you for the consult.      Kallie Samano, PharmD, BCPS  2021  5:20 AM

## 2021-12-07 NOTE — ED PROVIDER NOTES
[address] [facility]      Pt Name: Collette Payton  MRN: [id number]  [place] 1970  Date of evaluation: 12/6/2021  Provider: Chidi Mathew MD    CHIEF COMPLAINT       Chief Complaint   Patient presents with    Tremors     discharged yesterday from the 5th floor for possible TIA. sent home on abx    Fatigue     started couple hours ago. whole body weakness       HISTORY OF PRESENT ILLNESS    Collette Payton is a 54 y.o. male who presents to the emergency department with multiple complaints. Patient was just here and diagnosed with mastoiditis. Was placed on Augmentin. States has been taking the Augmentin. States today felt very fatigued and weak. Positive for confusion. Positive for shakiness and tremors. Denies alcohol withdrawal or alcohol usage. Denies drug abuse. Endorses 10 out of 10 achy pain everywhere. 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 Symptoms started spontaneously. Have been going on the last few days. States he was discharged she was feeling better. No other associated symptoms. Nursing Notes were reviewed. Including nursing noted for FM, Surgical History, Past Medical History, Social History, vitals, and allergies; agree with all. REVIEW OF SYSTEMS       Review of Systems   Constitutional: Positive for activity change, appetite change, chills and fatigue. Negative for diaphoresis and unexpected weight change. HENT: Negative for congestion, dental problem, drooling, ear discharge and ear pain. Eyes: Negative for photophobia, pain, discharge, redness, itching and visual disturbance. Respiratory: Negative for apnea, cough, choking, chest tightness, shortness of breath, wheezing and stridor. Cardiovascular: Negative for chest pain, palpitations and leg swelling. Gastrointestinal: Negative for abdominal distention, abdominal pain, anal bleeding, blood in stool, constipation, diarrhea, nausea, rectal pain and vomiting.    Endocrine: Negative for cold intolerance and heat intolerance. Genitourinary: Negative for decreased urine volume and urgency. Musculoskeletal: Negative for arthralgias and back pain. Skin: Negative for color change and pallor. Neurological: Positive for weakness. Negative for tremors and facial asymmetry. Hematological: Negative for adenopathy. Does not bruise/bleed easily. Psychiatric/Behavioral: Positive for confusion. Negative for behavioral problems. Except as noted above the remainder of the review of systems was reviewed and negative. PAST MEDICAL HISTORY     Past Medical History:   Diagnosis Date    ADHD (attention deficit hyperactivity disorder)     Anxiety     Chronic back pain     Depression     Erectile dysfunction     Headache     Hypertension     Hypothyroidism     Low testosterone     Low testosterone level in male     Neuropathy     PTSD (post-traumatic stress disorder)     Thyroid disease        SURGICAL HISTORY       Past Surgical History:   Procedure Laterality Date    BICEPS TENDON REPAIR Left     SHOULDER SURGERY Right        CURRENT MEDICATIONS       Previous Medications    ACETAMINOPHEN (TYLENOL) 500 MG TABLET    Take 500 mg by mouth every 6 hours as needed    AMOXICILLIN-CLAVULANATE (AUGMENTIN) 875-125 MG PER TABLET    Take 1 tablet by mouth 2 times daily for 10 days    FLUTICASONE (FLONASE) 50 MCG/ACT NASAL SPRAY    2 sprays by Each Nostril route daily    LEVOTHYROXINE (SYNTHROID) 125 MCG TABLET    Take 125 mcg by mouth Daily     METHYLPREDNISOLONE (MEDROL DOSEPACK) 4 MG TABLET    Take by mouth.     METOPROLOL (LOPRESSOR) 100 MG TABLET    TAKE ONE TABLET BY MOUTH TWICE A DAY       ALLERGIES     Bactrim [sulfamethoxazole-trimethoprim]    FAMILY HISTORY        Family History   Problem Relation Age of Onset    Diabetes Maternal Grandmother        SOCIAL HISTORY       Social History     Socioeconomic History    Marital status:      Spouse name: None    Number of children: None    Years of education: None    Highest education level: None   Occupational History    None   Tobacco Use    Smoking status: Former Smoker     Types: Cigarettes     Quit date: 12/3/2012     Years since quittin.0    Smokeless tobacco: Never Used   Vaping Use    Vaping Use: Never used   Substance and Sexual Activity    Alcohol use: Yes     Comment: occoasional use/SOCIALLY    Drug use: Not Currently    Sexual activity: Yes     Partners: Female   Other Topics Concern    None   Social History Narrative    None     Social Determinants of Health     Financial Resource Strain: Low Risk     Difficulty of Paying Living Expenses: Not hard at all   Food Insecurity: No Food Insecurity    Worried About Running Out of Food in the Last Year: Never true    Annabelle of Food in the Last Year: Never true   Transportation Needs: No Transportation Needs    Lack of Transportation (Medical): No    Lack of Transportation (Non-Medical):  No   Physical Activity:     Days of Exercise per Week: Not on file    Minutes of Exercise per Session: Not on file   Stress:     Feeling of Stress : Not on file   Social Connections:     Frequency of Communication with Friends and Family: Not on file    Frequency of Social Gatherings with Friends and Family: Not on file    Attends Mormon Services: Not on file    Active Member of [address] [place] [address] or Organizations: Not on file    Attends Club or Organization Meetings: Not on file    Marital Status: Not on file   Intimate Partner Violence:     Fear of Current or Ex-Partner: Not on file    Emotionally Abused: Not on file    Physically Abused: Not on file    Sexually Abused: Not on file   Housing Stability:     Unable to Pay for Housing in the Last Year: Not on file    Number of [place] in the Last Year: Not on file    Unstable Housing in the Last Year: Not on file       PHYSICAL EXAM       ED Triage Vitals [21 2121]   BP Temp Temp Source Pulse Resp SpO2 Height Weight 98/67 97.9 °F (36.6 °C) Oral 134 18 94 % -- 214 lb 4.6 oz (97.2 kg)       Physical Exam  Vitals and nursing note reviewed. Constitutional:       General: He is not in acute distress. Appearance: He is well-developed. He is ill-appearing. He is not diaphoretic. HENT:      Head: Normocephalic and atraumatic. Eyes:      General:         Right eye: No discharge. Left eye: No discharge. Pupils: Pupils are equal, round, and reactive to light. Neck:      Thyroid: No thyromegaly. Trachea: No tracheal deviation. Cardiovascular:      Rate and Rhythm: Regular rhythm. Tachycardia present. Heart sounds: No murmur heard. Pulmonary:      Breath sounds: No wheezing or rales. Chest:      Chest wall: No tenderness. Abdominal:      General: There is no distension. Palpations: Abdomen is soft. There is no mass. Tenderness: There is no abdominal tenderness. There is no guarding or rebound. Musculoskeletal:         General: No tenderness or deformity. Normal range of motion. Cervical back: Normal range of motion. Left lower leg: Left lower leg edema:    Skin:     General: Skin is warm. Coloration: Skin is not pale. Findings: No erythema or rash. Neurological:      Mental Status: He is alert. Cranial Nerves: No cranial nerve deficit. Motor: No abnormal muscle tone.       Coordination: Coordination normal.       DIAGNOSTIC RESULTS     RADIOLOGY:   Non-plain film images such as CT, Ultrasoundand MRI are read by the radiologist. Plain radiographic images are visualized and preliminarily interpreted by the emergency physician with the below findings:    Imaging reassuring    ED BEDSIDE ULTRASOUND:   Performed by ED Physician - none    LABS:  Labs Reviewed   CBC WITH AUTO DIFFERENTIAL - Abnormal; Notable for the following components:       Result Value    WBC 16.4 (*)     RBC 6.22 (*)     Hematocrit 53.6 (*)     Neutrophils Absolute 13.6 (*) REFLEX TO CULTURE - Abnormal; Notable for the following components:    Clarity, UA CLOUDY (*)     Glucose, Ur 500 (*)     Bilirubin Urine SMALL (*)     Ketones, Urine TRACE (*)     Protein, UA 30 (*)     All other components within normal limits    Narrative:     Performed at:  [address] [employer]   Phone [phone]   CREATININE, RANDOM URINE - Abnormal; Notable for the following components:    Creatinine, Ur 390.5 (*)     All other components within normal limits    Narrative:     Performed at:  [address] [employer]   Phone [phone]   URIC ACID - Abnormal; Notable for the following components:    Uric Acid, Serum 7.3 (*)     All other components within normal limits    Narrative:     Performed at:  [address] [employer]   Phone [phone]   MICROSCOPIC URINALYSIS - Abnormal; Notable for the following components:    Hyaline Casts, UA 11-20 (*)     Crystals, UA 3+ Ca.  Oxalate (*)     WBC, UA 8 (*)     Epithelial Cells, UA 16 (*)     All other components within normal limits    Narrative:     Performed at:  [address] [employer]   Phone [phone]   POCT GLUCOSE - Abnormal; Notable for the following components:    POC Glucose 210 (*)     All other components within normal limits    Narrative:     Performed at:  [address] [facility] [address]   Phone [phone]   CULTURE, BLOOD 1   CULTURE, BLOOD 2   PROCALCITONIN    Narrative:     Performed at:  [address] [employer]   Phone [phone]   MAGNESIUM    Narrative:     Felix Angeles tel. [phone],  Chemistry results called to and read back by [address] [place] [address], RN, 12/06/2021 23:28, by  Marcial Parson  Chemistry results called to and read back by Vincenzo Calderón RN, 12/06/2021 23:27, by  Marcial Parson  Performed at:  [facility]  [address], [facility] [place] [address]   Phone [phone]   ETHANOL    Narrative:     Performed at:  [facility]  [address] [facility] [place] [address]   Phone [phone]   MAGNESIUM    Narrative:     Performed at:  [facility]  [address] [facility] [place] [address]   Phone [phone]   OSMOLALITY, URINE    Narrative:     Performed at:  [facility]  [address] [facility] [place] [address]   Phone [phone]   SODIUM, URINE, RANDOM    Narrative:     Performed at:  [facility]  [address] [facility] [place] [address]   Phone [phone]   TSH WITH REFLEX    Narrative:     Performed at:  [facility]  [address] [facility] [place] [address]   Phone [phone]   CK   LACTIC ACID, PLASMA   LACTIC ACID, PLASMA   CORTISOL TOTAL   LACTIC ACID, PLASMA   LACTIC ACID, PLASMA   POCT GLUCOSE   POCT GLUCOSE   POCT GLUCOSE   POCT GLUCOSE   POCT GLUCOSE   POCT GLUCOSE   POCT GLUCOSE   POCT GLUCOSE   POCT GLUCOSE   POCT GLUCOSE   POCT GLUCOSE   POCT GLUCOSE   POCT GLUCOSE   POCT GLUCOSE   POCT GLUCOSE   POCT GLUCOSE       All other labs were withinnormal range or not returned as of this dictation. EMERGENCY DEPARTMENT COURSE and DIFFERENTIAL DIAGNOSIS/MDM:     PMH, Surgical Hx, FH, Social Hx reviewed by myself (ETOH usage, Tobacco usage, Drug usage reviewed by myself, no pertinent Hx)- No Pertinent Hx     Old records were reviewed by me     Sepsis-fluids antibiotics possible meningitis. Will need ID and neurology to see in the morning.     Hypokalemia-repleted    TAH-nuzvpxdy-dbqqpx given    Elevated troponin-most likely demand related secondary to dehydration and sepsis no chest pain or shortness of breath    DKA-fluids and insulin drip    Patient is extremely sick. My concern is for infectious etiology. Possible meningitis. May need lumbar puncture in the morning. Fluids antibiotics initiated. Decadron was also given. He will be admitted to the ICU. CRITICAL CARE TIME   Total Critical Caretime was 99 minutes, excluding separately reportable procedures. There was a high probability of clinically significant/life threatening deterioration in the patient's condition which required my urgent intervention. PROCEDURES:  Unlessotherwise noted below, none    FINAL IMPRESSION      1. Septicemia ([address]    2. Hypokalemia    3. Acute kidney injury ([address]    4. Elevated troponin    5.  Diabetic ketoacidosis without coma associated with diabetes mellitus due to underlying condition [facility])          DISPOSITION/PLAN   DISPOSITION Admitted 12/07/2021 12:44:21 AM      (Please note that portions ofthis note were completed with a voice recognition program.  Efforts were made to edit the dictations but occasionally words are mis-transcribed.)    Cristela Woodward MD(electronically signed)  Attending Emergency Physician        Cristela Woodward MD  12/07/21 0220

## 2021-12-07 NOTE — H&P
Hospital Medicine History & Physical      PCP: No primary care provider on file. Date of Admission: 12/6/2021    Date of Service: Pt seen/examined on 12/7/21 and Admitted to Inpatient with expected LOS greater than two midnights due to medical therapy. Chief Complaint: Tremors, fatigue, weakness      History Of Present Illness:    54 y.o. male with history of hypothyroidism, hypertension presented to emergency room with complaints of shakiness/tremors, fatigue and weakness for 1 day. .. He was recently admitted to the hospital with complaints of altered mental status, hearing loss, headache. .. CT head showed showed left mastoid inflammation and moderate sinus inflammation consistent with chronic inflammatory disease. [place] He was seen by ENT who felt Chronic sinonasal and mastoid inflammatory disease with left eustachian tube dysfunction and effusion. Sterile appearing with no clear evidence of acute infection. . Treated empirically with Augmentin and Medrol pack on discharge with plans to follow-up outpatient in 1 week. .     At home, the patient had tremors, fatigue and weakness was apparently with some confusion and was brought back to the ED. .  Initial vitals, BP 98/67, pulse 134, respirations 18, temperature 97.9, oxygen saturation 94% on room air chest x-ray was clear. ... BMP showed sodium 134, potassium 2.4, bicarb 14 with anion gap of 25, blood glucose 255. Serum lactate was 9.6,, procalcitonin 0.03. ... CT head showed Normal intracranial exam.   Minor bilateral ethmoid sinus mucosal disease markedly improved from the comparison study 3 days earlier. The patient was started on IV fluids, 30 cc/kg given normal saline, empiric broad-spectrum antibiotics IV vancomycin and Rocephin. ..  He also started on insulin drip/DKA protocol and transferred to the ICU    Past Medical History:          Diagnosis Date    ADHD (attention deficit hyperactivity disorder)     Anxiety     Chronic back pain     Depression     Erectile dysfunction     Headache     Hypertension     Hypothyroidism     Low testosterone     Low testosterone level in male     Neuropathy     PTSD (post-traumatic stress disorder)     Thyroid disease        Past Surgical History:          Procedure Laterality Date    BICEPS TENDON REPAIR Left     SHOULDER SURGERY Right        Medications Prior to Admission:      Prior to Admission medications    Medication Sig Start Date End Date Taking? Authorizing Provider   fluticasone (FLONASE) 50 MCG/ACT nasal spray 2 sprays by Each Nostril route daily 12/5/21   Lubna Wayne MD   amoxicillin-clavulanate (AUGMENTIN) 875-125 MG per tablet Take 1 tablet by mouth 2 times daily for 10 days 12/5/21 12/15/21  Lubna Wayne MD   methylPREDNISolone (MEDROL DOSEPACK) 4 MG tablet Take by mouth. 12/5/21 12/11/21  Lubna Wayne MD   acetaminophen (TYLENOL) 500 MG tablet Take 500 mg by mouth every 6 hours as needed 7/9/21   Historical Provider, MD   levothyroxine (SYNTHROID) 125 MCG tablet Take 125 mcg by mouth Daily  10/22/21   Historical Provider, MD   metoprolol (LOPRESSOR) 100 MG tablet TAKE ONE TABLET BY MOUTH TWICE A DAY 3/22/21   Yossi Cooper MD       Allergies:  Bactrim [sulfamethoxazole-trimethoprim]    Social History:      The patient currently lives     TOBACCO:   reports that he quit smoking about 9 years ago. His smoking use included cigarettes. He has never used smokeless tobacco.  ETOH:   reports current alcohol use. Family History:      Reviewed in detail and negative for DM, CAD, Cancer, CVA. Positive as follows:        Problem Relation Age of Onset    Diabetes Maternal Grandmother        REVIEW OF SYSTEMS:   Pertinent positives as noted in the HPI. All other systems reviewed and negative.     PHYSICAL EXAM:    /75   Pulse 96   Temp 97.6 °F (36.4 °C) (Oral)   Resp 24   Wt 217 lb 2.5 oz (98.5 kg)   SpO2 90%   BMI 32.07 kg/m²     General appearance:  No apparent distress, appears stated age and cooperative. HEENT:  Normal cephalic, atraumatic without obvious deformity. Pupils equal, round, and reactive to light. Extra ocular muscles intact. Conjunctivae/corneas clear. Neck: Supple, with full range of motion. No jugular venous distention. Trachea midline. Respiratory:  Normal respiratory effort. Clear to auscultation, bilaterally without Rales/Wheezes/Rhonchi. Cardiovascular:  Regular rate and rhythm with normal S1/S2 without murmurs, rubs or gallops. Abdomen: Soft, non-tender, non-distended with normal bowel sounds. Musculoskeletal:  No clubbing, cyanosis or edema bilaterally. Full range of motion without deformity. Skin: Skin color, texture, turgor normal.  No rashes or lesions. Neurologic:  Neurovascularly intact without any focal sensory/motor deficits. Cranial nerves: II-XII intact, grossly non-focal.  Psychiatric:  Alert and oriented, thought content appropriate, normal insight  Capillary Refill: Brisk,< 3 seconds   Peripheral Pulses: +2 palpable, equal bilaterally       CXR:  I have reviewed the CXR with the following interpretation:   EKG:  I have reviewed the EKG with the following interpretation:     Labs:     Recent Labs     12/05/21  0644 12/06/21 2151   WBC 8.3 16.4*   HGB 16.8 17.4   HCT 49.9 53.6*    326     Recent Labs     12/06/21 2151 12/07/21  0328 12/07/21  0927   * 139 137   K 2.4* 3.0* 3.4*   CL 95* 103 103   CO2 14* 16* 17*   BUN 18 13 9   CREATININE 1.8* 1.1 0.8*   CALCIUM 9.1 7.9* 8.4   PHOS  --  1.9* 1.1*     Recent Labs     12/06/21 2151   AST 30   ALT 41*   BILITOT 0.3   ALKPHOS 107     No results for input(s): INR in the last 72 hours.   Recent Labs     12/06/21 2151   CKTOTAL 240   TROPONINI 0.07*       Urinalysis:      Lab Results   Component Value Date    NITRU Negative 12/07/2021    WBCUA 8 12/07/2021    RBCUA 0-2 12/07/2021    BLOODU Negative 12/07/2021    SPECGRAV 1.029 12/07/2021    GLUCOSEU 500 12/07/2021 ASSESSMENT:      -Acute metabolic encephalopathy. Confusion on presentation. Related to sepsis, acidosis, renal failure. .. CT head with no acute issues--improved--continue supportive care      -Sepsis. [place] SIRS criteria with lactic acidosis. .. Source unclear ? [place] ... Continue broad-spectrum empiric antibiotics-on IV vancomycin and ceftriaxone--defer antibiotics to ID -follow-up on culture data      -Lactic acidosis likely due to sepsis. . Improving with fluids and antibiotics--continue to monitor, continue IV fluids-bicarb drip added by nephrology      -Stress-induced taenmpkvkmmgh-xcqrrxyt-bk DKA--hemoglobin A1c 5.8--- this is due to recent steroids and stress from sepsis--- discontinue insulin drip and add sliding scale insulin for now    -Acute kidney injury--prerenal-resolved with fluids-ariana    -hypokalemia--replete per protocol    -Hypophosphatemia--replete per protocol    -Chronic sinonasal and mastoid inflammatory disease with left eustachian tube dysfunction and effusion--- per recent ENT consult, there is no evidence of acute infection --- Medrol pack plus Augmentin was given empirically for 10 days and plan was to follow-up in the clinic . . CT today shows minor bilateral ethmoid sinus mucosal disease, markedly improved from 3 days ago    -Hypothyroidism-continue Synthroid    -Essential hypertension-resume metoprolol    DVT Prophylaxis: lovenox  Diet: ADULT DIET; Regular; Low Fat/Low Chol/High Fiber/TRACY  Code Status: Full Code           Donold Dakin, MD    Thank you No primary care provider on file. for the opportunity to be involved in this patient's care. If you have any questions or concerns please feel free to contact me at [phone].

## 2021-12-07 NOTE — ED TRIAGE NOTES
Patient to the ED via private car, states he was in the hospital for TIA workup  and discharged home yesterday, also dx with mastoiditis and sent home on PO Augmentin and steroid pack. Pt states he could not sleep last night and woke up with tremors, HA, and nausea, states got worse throughout the day. Pt with visible tremors, flushed. Occasional labored breathing, grimacing at times. Appears ill. Stretcher back to room, unable to ambulate at this time d/t weakness and \"all over body tremors\".

## 2021-12-07 NOTE — FLOWSHEET NOTE
12/07/21 1158   Encounter Summary   Services provided to: Patient; Patient and family together   Referral/Consult From: Nurse   Support System Spouse; Children; Family members   Place of [address] [place] [address] Avenue Visiting Yes  (visit, prayer, Bible 12/7 CL)   Complexity of Encounter Moderate   Length of Encounter 1 hour; 15 minutes   Spiritual/Yazdanism   Type Spiritual support   Assessment Calm; Approachable; Coping   Intervention Active listening; Explored feelings, thoughts, concerns; Prayer; Scripture; Discussed relationship with God; Discussed belief system/Quaker practices/ramesh; Discussed illness/injury and it's impact   Outcome Engaged in conversation; Coping;  Hopeful; Receptive   Electronically signed by [place] on 12/7/2021 at 12:11 PM

## 2021-12-07 NOTE — PROGRESS NOTES
Report given to Lashawn Barnes on rehab. Patient going to room 3262, will put in for transport. Writer called wife, Lawrence Camp, updated her on patient status and his moving to a new room, questions answered.     Electronically signed by Merissa Rees RN on 12/7/2021 at 6:21 PM

## 2021-12-07 NOTE — CONSULTS
Consult received  Full note to follow    Kris Conn MD  12/7/2021    [facility] [address]  Office : [phone]  Fax :[phone]

## 2021-12-07 NOTE — ED NOTES
POC glucose 210, notified Dr. Evelyn Hicks. Order to give total 3L IVF first before starting insulin infusion. Will give IVF and repeat blood sugar and infuse D5W if necessary when starting insulin infusion, see BALJEET Nunez RN  12/07/21 9268

## 2021-12-07 NOTE — ED PROVIDER NOTES
[address] [facility]        Pt Name: Madelyn Stacy  MRN: [id number]  [place] 1970  Date of evaluation: 12/6/2021  Provider: SANTA Mcarthur CNP  PCP: No primary care provider on file. Note Started: 10:42 PM EST       I have seen and evaluated this patient with my supervising physician, Dr. Mekhi Colvin. Triage CHIEF COMPLAINT       Chief Complaint   Patient presents with    Tremors     discharged yesterday from the 5th floor for possible TIA. sent home on abx    Fatigue     started couple hours ago. whole body weakness         HISTORY OF PRESENT ILLNESS   (Location/Symptom, Timing/Onset, Context/Setting, Quality, Duration, Modifying Factors, Severity)  Note limiting factors. Chief Complaint: Tremor, fatigue, generalized weakness, nausea and palpitations which onset 2 hours PTA. Madelyn Stacy is a 54 y.o. toxic appearing male who presents to ED status post he was admitted to the hospital here at [facility] from 12/3/2021 -12/5/2021 for acute encephalopathy. Patient was noted to have mastoiditis and was discharged home on Augmentin x10 days and a Medrol Dosepak. He states that he began with generalized weakness, tremors/shakes, fatigue, and palpitations accompanied by nausea which onset 2 hours PTA. Denies chest pain, vomiting, dizziness, presyncope, shortness of breath, fever, chills, cough, changes militates, hemoptysis, leg/calf pain or swelling, abdominal pain, diarrhea, or urinary symptoms. Patient's wife who is at bedside states that the patient was \"off this morning\" at 0800 hrs\" this a.m. He did not eat breakfast and had decreased appetite pretty much all day. He arrives here in ED and is diaphoretic and [email or web address] bpm.    Nursing Notes were all reviewed and agreed with or any disagreements were addressed in the HPI.     REVIEW OF SYSTEMS    (2-9 systems for level 4, 10 or more for level 5)     12 systems reviewed and negative unless otherwise noted in HPI. PAST MEDICAL HISTORY     Past Medical History:   Diagnosis Date    ADHD (attention deficit hyperactivity disorder)     Anxiety     Chronic back pain     Depression     Erectile dysfunction     Headache     Hypertension     Hypothyroidism     Low testosterone     Low testosterone level in male     Neuropathy     PTSD (post-traumatic stress disorder)     Thyroid disease        SURGICAL HISTORY     Past Surgical History:   Procedure Laterality Date    BICEPS TENDON REPAIR Left     SHOULDER SURGERY Right        CURRENTMEDICATIONS       Previous Medications    ACETAMINOPHEN (TYLENOL) 500 MG TABLET    Take 500 mg by mouth every 6 hours as needed    AMOXICILLIN-CLAVULANATE (AUGMENTIN) 875-125 MG PER TABLET    Take 1 tablet by mouth 2 times daily for 10 days    FLUTICASONE (FLONASE) 50 MCG/ACT NASAL SPRAY    2 sprays by Each Nostril route daily    LEVOTHYROXINE (SYNTHROID) 125 MCG TABLET    Take 125 mcg by mouth Daily     METHYLPREDNISOLONE (MEDROL DOSEPACK) 4 MG TABLET    Take by mouth.     METOPROLOL (LOPRESSOR) 100 MG TABLET    TAKE ONE TABLET BY MOUTH TWICE A DAY       ALLERGIES     Bactrim [sulfamethoxazole-trimethoprim]    FAMILYHISTORY       Family History   Problem Relation Age of Onset    Diabetes Maternal Grandmother         SOCIAL HISTORY       Social History     Socioeconomic History    Marital status:      Spouse name: Not on file    Number of children: Not on file    Years of education: Not on file    Highest education level: Not on file   Occupational History    Not on file   Tobacco Use    Smoking status: Former Smoker     Types: Cigarettes     Quit date: 12/3/2012     Years since quittin.0    Smokeless tobacco: Never Used   Vaping Use    Vaping Use: Never used   Substance and Sexual Activity    Alcohol use: Yes     Comment: occoasional use/SOCIALLY    Drug use: Not Currently    Sexual activity: Yes     Partners: Female ear normal.      Left Ear: External ear normal.      Nose: Nose normal.      Mouth/Throat:      Mouth: Mucous membranes are moist.   Eyes:      General: No scleral icterus. Right eye: No discharge. Left eye: No discharge. Extraocular Movements: Extraocular movements intact. Pupils: Pupils are equal, round, and reactive to light. Cardiovascular:      Rate and Rhythm: Regular rhythm. Tachycardia present. Pulses: Normal pulses. Heart sounds: Normal heart sounds. No murmur heard. No friction rub. Pulmonary:      Effort: Pulmonary effort is normal. No respiratory distress. Breath sounds: No wheezing, rhonchi or rales. Abdominal:      General: There is no distension. Palpations: Abdomen is soft. Tenderness: There is no abdominal tenderness. There is no right CVA tenderness, left CVA tenderness or guarding. Musculoskeletal:         General: Normal range of motion. Cervical back: Normal range of motion and neck supple. No rigidity or tenderness. Skin:     General: Skin is warm. Capillary Refill: Capillary refill takes less than 2 seconds. Comments: Diaphoretic   Neurological:      General: No focal deficit present. Mental Status: He is alert and oriented to person, place, and time. Motor: Weakness (Generalized bilateral weakness) present.    Psychiatric:         Mood and Affect: Mood normal.         Behavior: Behavior normal.         DIAGNOSTIC RESULTS   LABS:    I have reviewed and interpreted all of the currently available lab results from this visit:  Results for orders placed or performed during the hospital encounter of 12/06/21   CBC Auto Differential   Result Value Ref Range    WBC 16.4 (H) 4.0 - 11.0 K/uL    RBC 6.22 (H) 4.20 - 5.90 M/uL    Hemoglobin 17.4 13.5 - 17.5 g/dL    Hematocrit 53.6 (H) 40.5 - 52.5 %    MCV 86.2 80.0 - 100.0 fL    MCH 28.0 26.0 - 34.0 pg    MCHC 32.5 31.0 - 36.0 g/dL    RDW 13.0 12.4 - 15.4 %    Platelets 326 135 - 450 K/uL    MPV 8.8 5.0 - 10.5 fL    PLATELET SLIDE REVIEW Adequate     SLIDE REVIEW see below     Neutrophils % 83.0 %    Lymphocytes % 7.0 %    Monocytes % 10.0 %    Eosinophils % 0.0 %    Basophils % 0.0 %    Neutrophils Absolute 13.6 (H) 1.7 - 7.7 K/uL    Lymphocytes Absolute 1.1 1.0 - 5.1 K/uL    Monocytes Absolute 1.6 (H) 0.0 - 1.3 K/uL    Eosinophils Absolute 0.0 0.0 - 0.6 K/uL    Basophils Absolute 0.0 0.0 - 0.2 K/uL   Comprehensive Metabolic Panel w/ Reflex to MG   Result Value Ref Range    Sodium 134 (L) 136 - 145 mmol/L    Potassium reflex Magnesium 2.4 (LL) 3.5 - 5.1 mmol/L    Chloride 95 (L) 99 - 110 mmol/L    CO2 14 (LL) 21 - 32 mmol/L    Anion Gap 25 (H) 3 - 16    Glucose 255 (H) 70 - 99 mg/dL    BUN 18 7 - 20 mg/dL    CREATININE 1.8 (H) 0.9 - 1.3 mg/dL    GFR Non- 40 (A) >60    GFR  49 (A) >60    Calcium 9.1 8.3 - 10.6 mg/dL    Total Protein 7.3 6.4 - 8.2 g/dL    Albumin 4.2 3.4 - 5.0 g/dL    Albumin/Globulin Ratio 1.4 1.1 - 2.2    Total Bilirubin 0.3 0.0 - 1.0 mg/dL    Alkaline Phosphatase 107 40 - 129 U/L    ALT 41 (H) 10 - 40 U/L    AST 30 15 - 37 U/L   Lactic Acid, Plasma   Result Value Ref Range    Lactic Acid 9.6 (HH) 0.4 - 2.0 mmol/L   Procalcitonin   Result Value Ref Range    Procalcitonin 0.03 0.00 - 0.15 ng/mL   Troponin   Result Value Ref Range    Troponin 0.07 (H) <0.01 ng/mL   Brain Natriuretic Peptide   Result Value Ref Range    Pro- (H) 0 - 124 pg/mL   Urinalysis Reflex to Culture    Specimen: Urine, clean catch   Result Value Ref Range    Color, UA DK YELLOW Straw/Yellow    Clarity, UA CLOUDY (A) Clear    Glucose, Ur 500 (A) Negative mg/dL    Bilirubin Urine SMALL (A) Negative    Ketones, Urine TRACE (A) Negative mg/dL    Specific Gravity, UA 1.029 1.005 - 1.030    Blood, Urine Negative Negative    pH, UA 5.0 5.0 - 8.0    Protein, UA 30 (A) Negative mg/dL    Urobilinogen, Urine 0.2 <2.0 E.U./dL    Nitrite, Urine Negative Negative Leukocyte Esterase, Urine Negative Negative    Microscopic Examination YES     Urine Type NotGiven    Magnesium   Result Value Ref Range    Magnesium 2.30 1.80 - 2.40 mg/dL   Osmolality, Urine   Result Value Ref Range    Osmolality, Ur 734 390 - 1070 mOsm/kg   POCT Glucose   Result Value Ref Range    POC Glucose 210 (H) 70 - 99 mg/dl    Performed on ACCU-CHEK          When ordered, only abnormal lab results are displayed. All other labs were within normal range or not returned as of this dictation. EKG: When ordered, EKG's are interpreted by the Emergency Department Physician in the absence of a cardiologist.  Please see their note for interpretation of EKG. RADIOLOGY:   Non-plain film images such as CT, Ultrasound and MRI are read by the radiologist. Jugo radiographic images are visualized andpreliminarily interpreted by the  ED Provider with the below findings:        Interpretation perthe Radiologist below, if available at the time of this note:    XR CHEST (2 VW)    Result Date: 12/6/2021  No acute findings in the chest.     CT HEAD WO CONTRAST    Result Date: 12/6/2021  Normal intracranial exam. Minor bilateral ethmoid sinus mucosal disease markedly improved from the comparison study 3 days earlier. CT HEAD WO CONTRAST    Result Date: 12/3/2021  No acute intracranial abnormality. Chronic pansinusitis. Prominent left mastoid effusion. XR CHEST PORTABLE    Result Date: 12/3/2021  Cardiomegaly Bibasilar hypoaeration     CTA HEAD NECK W CONTRAST    Result Date: 12/3/2021  Unremarkable CTA of the neck. Prominent right posterior communicating artery or fetal origin of the right posterior cerebral artery which is a normal anatomic variant. No significant abnormality of the intracranial circulation. Patchy airspace changes in the lung apices concerning for infiltrates. CT could better evaluate lung parenchyma if indicated. MRI BRAIN WO CONTRAST    Result Date: 12/4/2021  1.  No acute intracranial abnormality. No acute infarction. 2. Left mastoid air cell effusion, which could be seen with mastoiditis. Clinical correlation is recommended. PROCEDURES   Unless otherwise noted below, none     Procedures    CRITICAL CARE TIME   N/A    CONSULTS:  None      EMERGENCY DEPARTMENT COURSE and DIFFERENTIAL DIAGNOSIS/MDM:   Patient presents emerged department with a 2-hour history of nausea, fatigue, weakness, tremor, and palpitations. Alternate diagnoses were considered but less likely based on history and physical.  Considered acute coronary syndrome, pulmonary embolism, COPD/asthma, pneumonia,  pericardial tamponade, pneumothorax, CHF, thoracic aortic dissection, among others less likely based on history and physical.  Urinalysis reflex to culture shows clarity cloudy, glucose 500, bilirubin small, ketones trace, protein 30, otherwise unremarkable exam WNL, ethanol not detected, POCT glucose 210 CHEM panel reveals mild hyponatremia, [email or web address], hypochloremia at 95, CO2 14, anion gap 25, glucose 255, creatinine 1.8, and GFR 40; , troponin 0 0.07, lactic acid very [email or web address] no acute findings in the chest.  CT head for intracranial exam.  Bilateral ethmoid sinus mucosal disease markedly improved from the comparison study 3 days ago. Patient found to be septic (of unknown origin) and sepsis fluids and dual antibiotic therapy were initiated with vancomycin and Rocephin. Insulin infusion was also started. Patient was medicated with Zofran for nausea, Tylenol for pain, and Ativan for anxiety. Patient was found to be hypokalemic and his potassium was repleted.       Vitals:    Vitals:    12/06/21 2121   BP: 98/67   Pulse: 134   Resp: 18   Temp: 97.9 °F (36.6 °C)   TempSrc: Oral   SpO2: 94%   Weight: 214 lb 4.6 oz (97.2 kg)       Patient was given thefollowing medications:  Medications   potassium chloride 10 mEq/100 mL IVPB (Peripheral Line) (10 mEq IntraVENous New Bag 12/7/21 0055) 0.9 % sodium chloride bolus (1,000 mLs IntraVENous New Bag 12/7/21 0042)   glucose (GLUTOSE) 40 % oral gel 15 g (has no administration in time range)   dextrose 50 % IV solution (has no administration in time range)   glucagon (rDNA) injection 1 mg (has no administration in time range)   dextrose 5 % solution (has no administration in time range)   insulin regular (HUMULIN R;NOVOLIN R) 100 Units in sodium chloride 0.9 % 100 mL infusion (has no administration in time range)   0.9 % sodium chloride bolus (has no administration in time range)   acetaminophen (TYLENOL) tablet 1,000 mg (1,000 mg Oral Given 12/6/21 2238)   0.9 % sodium chloride bolus (0 mLs IntraVENous Stopped 12/7/21 0027)   cefTRIAXone (ROCEPHIN) 2000 mg IVPB in D5W 50ml minibag (0 mg IntraVENous Stopped 12/6/21 2343)   vancomycin 1000 mg IVPB in 250 mL D5W addavial (0 mg IntraVENous Stopped 12/7/21 0110)   LORazepam (ATIVAN) injection 1 mg (1 mg IntraVENous Given 12/6/21 2311)   ondansetron (ZOFRAN) injection 4 mg (4 mg IntraVENous Given 12/6/21 2311)   0.9 % sodium chloride bolus (0 mLs IntraVENous Stopped 12/7/21 0102)   dexamethasone (DECADRON) injection 10 mg (10 mg IntraVENous Given 12/7/21 0043)   LORazepam (ATIVAN) injection 2 mg (2 mg IntraVENous Given 12/7/21 0050)             FINAL IMPRESSION    Septicemia  Hypokalemia  Acute kidney injury  Elevated troponin  DKA without coma associated diabetes mellitus due to underlying condition        Disposition:  Admitted      Patient will be admitted to hospital for further evaluation and treatment.        Hospitalist: Dr. Ashley Ivy      Discussed patients HPI, ED work-up, results, treatment, and response with my attending physician Dr. Esa Camargo and the Hospitalist -Dr. Ashley Ivy who agrees to admit the patient to the hospital.             (Please note that portions ofthis note were completed with a voice recognition program.  Efforts were made to edit the dictations but occasionally words are mis-transcribed.)    SANTA Mcarthur CNP (electronically signed)              SANTA Mcarthur CNP  12/07/21 0145

## 2021-12-07 NOTE — CONSULTS
Infectious Diseases Inpatient Consult Note      Reason for Consult:  Sepsis, WBC elevation and chills     Requesting Physician:       Primary Care Physician:  No primary care provider on file. History Obtained From:  Epic and patient     CHIEF COMPLAINT:     Chief Complaint   Patient presents with    Tremors     discharged yesterday from the 5th floor for possible TIA. sent home on abx    Fatigue     started couple hours ago. whole body weakness         HISTORY OF PRESENT ILLNESS:  54 y.o. Man with HTN, ADHD, eX smoker and Polycythemia admitted with chills, nausea and not feeling himself - wife notice he was slow and having tremors, ? Rigors but no fevers recorded and he was very flushed - was d/c from [facility] on 12/5 for Left ear pain and early mastoiditis on the CT scan placed on Medrol dose pack and Augmentin seen by ENT on last admit. He has taken steroids taper for 1 day and has taken Augmentin as prescribed now WBC elevation, Lactic acid at 9 and Creat elevation- Left ear symptoms are better no fevers no cough. Blood cx negative, no unusual exposures noted. He was admitted to ICU due to concern for sepsis, We are consulted for recommendations. Wife at bed side has provided some details. Past Medical History:    Past Medical History:   Diagnosis Date    ADHD (attention deficit hyperactivity disorder)     Anxiety     Chronic back pain     Depression     Erectile dysfunction     Headache     Hypertension     Hypothyroidism     Low testosterone     Low testosterone level in male     Neuropathy     PTSD (post-traumatic stress disorder)     Thyroid disease        Past Surgical History:    Past Surgical History:   Procedure Laterality Date    BICEPS TENDON REPAIR Left     SHOULDER SURGERY Right        Current Medications:    No outpatient medications have been marked as taking for the 12/6/21 encounter [facility] Encounter).        Allergies:  Bactrim [sulfamethoxazole-trimethoprim]    Immunizations :   Immunization History   Administered Date(s) Administered    COVID-19, [place] Batch, Primary or Immunocompromised, PF, 100mcg/0.5mL 10/21/2021    DTaP 2015         Social History:  Social History     Tobacco Use    Smoking status: Former Smoker     Types: Cigarettes     Quit date: 12/3/2012     Years since quittin.0    Smokeless tobacco: Never Used   Vaping Use    Vaping Use: Never used   Substance Use Topics    Alcohol use: Yes     Comment: occoasional use/SOCIALLY    Drug use: Not Currently     Social History     Tobacco Use   Smoking Status Former Smoker    Types: Cigarettes    Quit date: 12/3/2012    Years since quittin.0   Smokeless Tobacco Never Used      Family History   Problem Relation Age of Onset    Diabetes Maternal Grandmother         REVIEW OF SYSTEMS:     Constitutional:  negative for fevers, chills + , night sweats  Eyes:  negative for blurred vision, eye discharge, visual disturbance   HEENT:  negative for hearing loss, ear drainage,nasal congestion  Respiratory:  negative for cough, shortness of breath or hemoptysis   Cardiovascular:  negative for chest pain, palpitations, syncope  Gastrointestinal:  negative for nausea, vomiting, diarrhea, constipation, abdominal pain  Genitourinary:  negative for frequency, dysuria, urinary incontinence, hematuria  Hematologic/Lymphatic:  negative for easy bruising, bleeding and lymphadenopathy  Allergic/Immunologic:  negative for recurrent infections, angioedema, anaphylaxis   Endocrine:  negative for weight changes, polyuria, polydipsia and polyphagia  Musculoskeletal:  negative for joint  pain, swelling, decreased range of motion  Integumentary: No rashes, skin lesions  Neurological:  negative for headaches, slurred speech, unilateral weakness  Psychiatric: negative for hallucinations,confusion,agitation.      PHYSICAL EXAM:      Vitals:    BP (!) 147/70   Pulse 98   Temp 98.1 °F (36.7 °C) (Oral)   Resp 17   Wt 217 lb 2.5 oz (98.5 kg)   SpO2 95%   BMI 32.07 kg/m²     General Appearance: alert,in some  acute distress, no pallor, no icterus Flushed from face to chest with Erythema+ no cellulitis no oral lesions    Skin: warm and dry, no rash or erythema  Head: normocephalic and atraumatic  Eyes: pupils equal, round, and reactive to light, conjunctivae normal  ENT: tympanic membrane, external ear and ear canal normal bilaterally, nose without deformity, nasal mucosa and turbinates normal without polyps  Neck: supple and non-tender without mass, no thyromegaly  no cervical lymphadenopathy  Pulmonary/Chest: clear to auscultation bilaterally- no wheezes, rales or rhonchi, normal air movement, no respiratory distress  Cardiovascular: normal rate, regular rhythm, normal S1 and S2, no murmurs, rubs, clicks, or gallops, no carotid bruits  Abdomen: soft, non-tender, non-distended, normal bowel sounds, no masses or organomegaly  Extremities: no cyanosis, clubbing or edema  Musculoskeletal: normal range of motion, no joint swelling, deformity or tenderness  Integumentary: No rashes, no abnormal skin lesions, no petechiae  Neurologic: reflexes normal and symmetric, no cranial nerve deficit  Psych:  Orientation, sensorium, mood normal   Lines: IV    DATA:    CBC:   Lab Results   Component Value Date    WBC 16.4 (H) 12/06/2021    HGB 17.4 12/06/2021    HCT 53.6 (H) 12/06/2021    MCV 86.2 12/06/2021     12/06/2021     RENAL:   Lab Results   Component Value Date    CREATININE 1.1 12/07/2021    BUN 13 12/07/2021     12/07/2021    K 3.0 (L) 12/07/2021     12/07/2021    CO2 16 (L) 12/07/2021     SED RATE:   Lab Results   Component Value Date    SEDRATE 1 10/28/2019     CK:   Lab Results   Component Value Date    CKTOTAL 240 12/06/2021     CRP: No results found for: CRP  Hepatic Function Panel:   Lab Results   Component Value Date    ALKPHOS 107 12/06/2021    ALT 41 12/06/2021    AST 30 12/06/2021    PROT 7.3 12/06/2021    BILITOT 0.3 12/06/2021    LABALBU 4.2 12/06/2021     UA:  Lab Results   Component Value Date    COLORU DK YELLOW 12/07/2021    CLARITYU CLOUDY 12/07/2021    GLUCOSEU 500 12/07/2021    BILIRUBINUR SMALL 12/07/2021    KETUA TRACE 12/07/2021    SPECGRAV 1.029 12/07/2021    BLOODU Negative 12/07/2021    PHUR 5.0 12/07/2021    PROTEINU 30 12/07/2021    UROBILINOGEN 0.2 12/07/2021    NITRU Negative 12/07/2021    LEUKOCYTESUR Negative 12/07/2021    LABMICR YES 12/07/2021    URINETYPE NotGiven 12/07/2021      Urine Microscopic:   Lab Results   Component Value Date    HYALCAST 11-20 12/07/2021    WBCUA 8 12/07/2021    RBCUA 0-2 12/07/2021    EPIU 16 12/07/2021     Urine Reflex to Culture:   Lab Results   Component Value Date    URRFLXCULT Not Indicated 12/07/2021       Creat 1.8     Lactic acid  9.6     Procal  0.03    WBC  16.4     Ck 240        MICRO: cultures reviewed and updated by me     Procedure Component Value Units Date/Time   Culture, Blood 2 [[id number]] Collected: 12/06/21 2151   Order Status: Sent Specimen: Blood Updated: 12/07/21 0019   Culture, Blood 1 [[id number]] Collected: 12/06/21 2151   Order Status: Sent Specimen: Blood Updated: 12/06/21 2211       Blood Culture:   Lab Results   Component Value Date    [facility]  12/03/2021     No Growth to date. Any change in status will be called. BLOODCULT2  12/03/2021     No Growth to date. Any change in status will be called. Ref Range & Units 12/3/21 1707   Amphetamine Screen, Urine Negative <1000ng/mL Neg    Barbiturate Screen, Ur Negative <200 ng/mL Neg    Benzodiazepine Screen, Urine Negative <200 ng/mL Neg    Cannabinoid Scrn, Ur Negative <50 ng/mL POSITIVE Abnormal     Cocaine Metabolite Screen, Urine Negative <300 ng/mL Neg    Opiate Scrn, Ur Negative <300 ng/mL POSITIVE Abnormal     Comment: \"Therapeutic levels of pain medication, especially oxycontin and synthetic   opioids, may not be detected by this Methodology. Pain management screen   panel  Drug panel-PM-Hi Res Ur, Interp (PAIN) should be considered for drug   monitoring \". PCP Screen, Urine Negative <25 ng/mL Neg    Methadone Screen, Urine Negative <300 ng/mL Neg    Propoxyphene Scrn, Ur Negative <300 ng/mL Neg    Oxycodone Urine Negative <100 ng/ml Neg    pH, UA  6.0        Viral Culture:    No results found for: COVID19  Urine Culture: No results for input(s): LABURIN in the last 72 hours. Scheduled Meds:   metoprolol tartrate  100 mg Oral BID    levothyroxine  125 mcg Oral Daily    fluticasone  2 spray Each Nostril Daily    enoxaparin  40 mg SubCUTAneous Daily    cefTRIAXone (ROCEPHIN) IV  1,000 mg IntraVENous Q24H    hydrOXYzine  50 mg Oral Once    vancomycin  1,500 mg IntraVENous Q12H    vancomycin (VANCOCIN) intermittent dosing (placeholder)   Other RX Placeholder    potassium phosphate IVPB  20 mmol IntraVENous Once       Continuous Infusions:   sodium chloride      dextrose 5 % and 0.45 % NaCl 150 mL/hr at 12/07/21 0521    insulin 3.6 Units/hr (12/07/21 0953)       PRN Meds:  dextrose, potassium chloride, magnesium sulfate, polyethylene glycol, dextrose 5 % and 0.45 % NaCl, acetaminophen, potassium chloride    Imaging:   CT HEAD WO CONTRAST   Final Result   Normal intracranial exam.      Minor bilateral ethmoid sinus mucosal disease markedly improved from the   comparison study 3 days earlier. XR CHEST (2 VW)   Final Result      No acute findings in the chest.               MRI Brain : 12/4/21    FINDINGS:   INTRACRANIAL STRUCTURES/VENTRICLES: There is no acute infarct. No mass effect   or midline shift. No evidence of an acute intracranial hemorrhage.  The   ventricles and sulci are normal in size and configuration.  The   sellar/suprasellar regions appear unremarkable.  The normal signal voids   within the major intracranial vessels appear maintained.  There are scattered   periventricular and deep subcortical white matter T2/FLAIR · Illness(es)/ Infection present that pose threat to bodily function. · There is potential for severe exacerbation of infection/side effects of treatment. · Therapy requires intensive monitoring for antimicrobial agent toxicity. Thanks for allowing me to participate in your patient's care please call me with any questions or concerns.     Dr. Yohan Benavides MD  [address] [place] [address] Physician  Phone: [phone]   Fax : [phone]

## 2021-12-07 NOTE — CONSULTS
Office: [phone]       Fax: [phone]      Nephrology Initial Consult Note        Patient's Name: Rico Alaniz  Admit Date: 12/6/2021  Date of Visit: 12/7/2021    Reason for Consult: ALFREDO  Requesting Physician:  Ana Bhardwaj MD  PCP: No primary care provider on file. Chief Complaint:  weakness    History of Present Illness:      Rico Alaniz is a 54 y.o. male with PMHx of hypertension, anxiety, ADHD who was hospitalized on 12/6/2021 with complaints of weakness  the patient was admitted to the hospital here at [facility] from 12/3/2021 -12/5/2021 for acute encephalopathy. Patient was noted to have mastoiditis and was discharged home on Augmentin x10 days and a Medrol Dosepak  Currently on insulin infusion   No history of DM  No hematuria, dysuria  NSAID use: Denies   IV contrast: None recent   Home meds reviewed    Past Medical History:   Diagnosis Date    ADHD (attention deficit hyperactivity disorder)     Anxiety     Chronic back pain     Depression     Erectile dysfunction     Headache     Hypertension     Hypothyroidism     Low testosterone     Low testosterone level in male     Neuropathy     PTSD (post-traumatic stress disorder)     Thyroid disease        Past Surgical History:   Procedure Laterality Date    BICEPS TENDON REPAIR Left     SHOULDER SURGERY Right        Family History   Problem Relation Age of Onset    Diabetes Maternal Grandmother         reports that he quit smoking about 9 years ago. His smoking use included cigarettes. He has never used smokeless tobacco. He reports current alcohol use. He reports previous drug use. Medications:    Allergies:  Bactrim [sulfamethoxazole-trimethoprim]    Scheduled Meds:   metoprolol tartrate  100 mg Oral BID    levothyroxine  125 mcg Oral Daily    fluticasone  2 spray Each Nostril Daily    enoxaparin  40 mg SubCUTAneous Daily    cefTRIAXone (ROCEPHIN) IV  1,000 mg IntraVENous Q24H    hydrOXYzine  50 mg Oral Once    vancomycin  1,500 mg IntraVENous Q12H    vancomycin (VANCOCIN) intermittent dosing (placeholder)   Other RX Placeholder     Continuous Infusions:   sodium chloride      dextrose 5 % and 0.45 % NaCl 150 mL/hr at 12/07/21 0521    insulin 4.05 Units/hr (12/07/21 0807)       Labs:  CBC:   Recent Labs     12/05/21 0644 12/06/21 2151   WBC 8.3 16.4*   HGB 16.8 17.4    326     Ca/Mg/Phos:   Recent Labs     12/05/21  0644 12/06/21  2151 12/07/21  0035 12/07/21  0328   CALCIUM 9.0 9.1  --  7.9*   MG  --  2.30 2.00 1.90   PHOS  --   --   --  1.9*     UA:  Recent Labs     12/07/21  0111   COLORU DK YELLOW   CLARITYU CLOUDY*   GLUCOSEU 500*   BILIRUBINUR SMALL*   KETUA TRACE*   SPECGRAV 1.029   BLOODU Negative   PHUR 5.0   PROTEINU 30*   UROBILINOGEN 0.2   NITRU Negative   LEUKOCYTESUR Negative   LABMICR YES   URINETYPE NotGiven      Urine Microscopic:   Recent Labs     12/07/21  0111   HYALCAST 11-20*   WBCUA 8*   RBCUA 0-2   EPIU 16*     Urine Chemistry:   Recent Labs     12/07/21  0111   LABCREA 390.5*   NAUR 69         ROS:     All systems reviewed and negative except as in HPI    Objective:     Vitals: BP (!) 131/56   Pulse 98   Temp 98.1 °F (36.7 °C) (Oral)   Resp 22   Wt 217 lb 2.5 oz (98.5 kg)   SpO2 96%   BMI 32.07 kg/m²    Wt Readings from Last 3 Encounters:   12/07/21 217 lb 2.5 oz (98.5 kg)   12/05/21 217 lb 12.8 oz (98.8 kg)   06/22/21 220 lb 2 oz (99.8 kg)      24HR INTAKE/OUTPUT:      Intake/Output Summary (Last 24 hours) at 12/7/2021 0144  Last data filed at 12/7/2021 0800  Gross per 24 hour   Intake --   Output 1100 ml   Net -1100 ml     Constitutional:  awake, NAD  HEENT:  MMM, No icterus  Neck: no bruits, No JVD  Cardiovascular:  reg rhythm  Respiratory: CTA, no crackles  Abdomen:  +BS, soft, NT, ND  Ext: no lower extremity edema  Psychiatric: mood and affect appropriate  Skin: dry/intact  CNS: alert, no agitation    IMAGING:  CT HEAD WO CONTRAST   Final Result   Normal intracranial exam.      Minor bilateral ethmoid sinus mucosal disease markedly improved from the   comparison study 3 days earlier. XR CHEST (2 VW)   Final Result      No acute findings in the chest.             Assessment :     1. ALFREDO  -Non-Oliguric  -Baseline creat: <1 Now 1.8->1.1  -UA: glucosuria, hyaline cast, prot +  -Volume: Hypovolemic  -Electrolytes: Hypokalemia  -Acid-Base: AGMA    Recent Labs     12/05/21  0644 12/06/21  2151 12/07/21  0328   BUN 15 18 13   CREATININE 0.9 1.8* 1.1     Recent Labs     12/05/21  0644 12/06/21 2151 12/07/21  0035 12/07/21  0328    134*  --  139   K 4.1 2.4*  --  3.0*   CO2 22 14*  --  16*   MG  --  2.30 2.00 1.90         2. HTN  -Blood pressure at goal     BP Readings from Last 1 Encounters:   12/07/21 (!) 131/56       3. Sepsis    4. Hyperglycemia    Plan:     - change IVF to sod bicarb  - Antimicrobials per primary team/ID  - culture  - UPC  - replace K  - replace phos  - Monitor BMP    -Monitor I/O, UOP  -Maintain MAP>65  -Avoid nephrotoxin, if able. -Dose meds to current eGFR    Spoke to RN    Thank you for allowing us to participate in care of Sena Shaikh . We will continue to follow. Feel free to contact me with any questions.       Desi Lieberman MD  12/7/2021    [facility] [address]  Office : [phone]  Fax :[phone]

## 2021-12-07 NOTE — ED PROVIDER NOTES
Triage note: I placed order to initiate their ED workup in an expeditious manner. Please see notes from other ED providers regarding comprehensive evaluation including full history, physical exam, interpretation of results, and medical decision making/disposition. Brief triage synopsis: Patient is a pleasant 46-year-old male that presents with his wife. He was discharged yesterday after a work-up for a TIA, subsequently found mastoiditis on the left side, and started on some antibiotics with an ENT referral on an outpatient basis. He has not skipped any doses of the antibiotic. Started with chills and tremors/shaking generalized with fatigue that is also generalized. Had some palpitations earlier today, only one occurrence that lasted 20 to 30 minutes and then spontaneously resolved. No chest pain, shortness of breath or palpitations currently. Is having some generalized posterior neck pain. Neck is supple with no meningismus noted. No measured fever but he is significantly tachycardic at 134 with a sinus tachycardia on EKG. BP is slightly soft at 98/67. Given the mastoiditis I am concerned for worsening infection, possibly even sepsis, orders initiated from triage area. He has no signs of facial droop, thinks that it is Tuesday but knows where city, the year, and is alert to person. Moving all 4 extremities spontaneously with no ataxia or gait abnormalities noticed.      Bhavana Ojeda, SANTA - CNP  12/06/21 8468

## 2021-12-07 NOTE — CONSULTS
 insulin lispro  0-3 Units SubCUTAneous Nightly     Continuous Infusions:   dextrose 5 % and 0.45 % NaCl 150 mL/hr at 21 1205    sodium bicarbonate infusion 150 mL/hr at 21 1512     PRN Meds:.dextrose, potassium chloride, magnesium sulfate, polyethylene glycol, dextrose 5 % and 0.45 % NaCl, acetaminophen, hydrOXYzine, potassium chloride    Medications Prior to Admission: fluticasone (FLONASE) 50 MCG/ACT nasal spray, 2 sprays by Each Nostril route daily  amoxicillin-clavulanate (AUGMENTIN) 875-125 MG per tablet, Take 1 tablet by mouth 2 times daily for 10 days  methylPREDNISolone (MEDROL DOSEPACK) 4 MG tablet, Take by mouth. acetaminophen (TYLENOL) 500 MG tablet, Take 500 mg by mouth every 6 hours as needed  levothyroxine (SYNTHROID) 125 MCG tablet, Take 125 mcg by mouth Daily   metoprolol (LOPRESSOR) 100 MG tablet, TAKE ONE TABLET BY MOUTH TWICE A DAY    Allergies   Allergen Reactions    Bactrim [Sulfamethoxazole-Trimethoprim] Anaphylaxis       Family History   Problem Relation Age of Onset    Diabetes Maternal Grandmother        Social History     Tobacco Use   Smoking Status Former Smoker    Types: Cigarettes    Quit date: 12/3/2012    Years since quittin.0   Smokeless Tobacco Never Used     Social History     Substance and Sexual Activity   Drug Use Not Currently     Social History     Substance and Sexual Activity   Alcohol Use Yes    Comment: occoasional use/SOCIALLY       ROS:  Constitutional- No weight loss  Eyes- No diplopia. No photophobia. Ears/nose/throat- No dysphagia. No Dysarthria  Cardiovascular- No palpitations. No chest pain  Respiratory- No dyspnea. Gastrointestinal- No Abdominal pain. No Vomiting. Genitourinary- No incontinence. No urinary retention  Musculoskeletal- No myalgia. No arthralgia  Skin- No rash. No easy bruising. Psychiatric- No depression. No anxiety  Endocrine- + diabetes. No thyroid issues. Hematologic- No bleeding difficulty.  No fatigue  Neurologic- + weakness. + Headache. Exam:  Vitals:    12/07/21 1300 12/07/21 1400 12/07/21 1645 12/07/21 1730   BP: 139/75 (!) 150/99 (!) 170/88 (!) 177/72   Pulse: 96 99 104 106   Resp: 24 23 15 27   Temp:   98.3 °F (36.8 °C)    TempSrc:   Oral    SpO2: 90% 92% 95%    Weight:          Constitutional    Vital signs: BP, HR, and RR reviewed   General Alert, no distress, well-nourished  Eyes: unable to visualize the fundi  Cardiovascular: pulses symmetric in all 4 extremities. No peripheral edema. Psychiatric: cooperative with examination, no  psychotic behavior noted. Neurologic  Mental status:   orientation to person, place, time and situation   General fund of knowledge:  grossly intact   Memory: Short term and long term intact   Attention intact as able to attend well to the exam     Language fluent in conversation   Comprehension intact; follows simple commands  Cranial nerves:   CN2: Visual Fields full w/o extinction on confrontational testing,   CN 3,4,6: extraocular muscles intact,  CN5: V1: V2: V3: intact to pinprick sensation bilaterally  CN7: upper and lower facial symmetric without dysarthria  CN8: hearing grossly intact to conversation. CN9/10: palate elevated symmetrically  CN11: trap full strength on shoulder shrug bilaterally, SCM without weakness. CN12: tongue midline with protrusion. Able to move tongue side to side. Strength: Grasp: BUE 5/5 . Intrinsic hand muscle strength: Finger Flexion:5/5 bilaterally. Finger Extension: 5/5 bilaterally. RUE: 5/5 Shoulder abduction, elbow flexion, elbow extension. LUE: 5/5  Shoulder abduction, elbow flexion, elbow extension. Dorsiflexion: R 5/5, L 5/5 ;  Plantar flexion: R 5/5, L 5/5  RLE: 5/5/5 Hip flexion, Knee flexion, Knee extension. LLE: 5/5 Hip flexion, Knee flexion, Knee extension.    Deep tendon reflexes:   R Bicep: 2  R Brachioradialis: 2  R Patellar: 2   R Babinski: Toes 2  L Bicep 2 L Brachioradialis[de-identified] 2  L Patellar: 2    L Babinski: Toes 2  Sensory: light touch intact in all 4 extremities. Cerebellar/coordination: finger nose finger normal without ataxia. He does have diffuse tremulousness. Tone: normal in all 4 extremities  Gait: deferred due to patient safety    No Nuchal Rigidity. Labs    Lab Results   Component Value Date     12/07/2021    K 3.6 12/07/2021    K 2.4 12/06/2021     12/07/2021    CO2 16 12/07/2021    BUN 8 12/07/2021    CREATININE 0.7 12/07/2021    GLUCOSE 95 12/07/2021    CALCIUM 8.4 12/07/2021      Lab Results   Component Value Date    WBC 16.4 (H) 12/06/2021    HGB 17.4 12/06/2021    HCT 53.6 (H) 12/06/2021    MCV 86.2 12/06/2021     12/06/2021         Studies  CT Head w/o:  Normal intracranial exam.       Minor bilateral ethmoid sinus mucosal disease markedly improved from the   comparison study 3 days earlier. MRI Brain w/o 12/04/21 (personally reviewed):  1. No acute intracranial abnormality.  No acute infarction. 2. Left mastoid air cell effusion, which could be seen with mastoiditis. Clinical correlation is recommended.              Impression  1. Sepsis  2. Acute Encephalopathy  3. Hypokalemia  4. Hypophosphatemia    Germania Olsen is a 54 y.o. male who presents with severe sepsis and electrolyte derangements with secondary AMS and weakness, now improving with antibiotics and fluids. Exam is currently normal outside of diffuse tremulousness, which is non-specific and can be seen with infection. He denies any history of alcohol or delirium tremens, but if he does develop hallucinations or unexplained autonomic instability this can still be considered, as diffuse tremulousness can be an early sign. No nuchal rigidity to suggest meningitis and non-contrasted MRI brain without signal change in brain. Discussed with patient his AMS (now improving) and weakness is likely from the severe sepsis, and anticipate it will improve with continued management of sepsis/infection. Weakness is common with hypokalemia and hypophosphatemia and so continued repletion of these will help. Would defer lumbar puncture and repeat contrasted MRI brain for now given low suspicion for CNS infection. If any new neurologic events occur can re-consider additional testing and we remain available for questions, but will otherwise sign off for now.      David Eckert MD  Neurology

## 2021-12-08 LAB
ANION GAP SERPL CALCULATED.3IONS-SCNC: 12 MMOL/L (ref 3–16)
BASOPHILS ABSOLUTE: 0.1 K/UL (ref 0–0.2)
BASOPHILS ABSOLUTE: 0.1 K/UL (ref 0–0.2)
BASOPHILS RELATIVE PERCENT: 0.7 %
BASOPHILS RELATIVE PERCENT: 1 %
BUN BLDV-MCNC: 7 MG/DL (ref 7–20)
CALCIUM SERPL-MCNC: 8.8 MG/DL (ref 8.3–10.6)
CHLORIDE BLD-SCNC: 108 MMOL/L (ref 99–110)
CO2: 23 MMOL/L (ref 21–32)
CREAT SERPL-MCNC: 0.8 MG/DL (ref 0.9–1.3)
EOSINOPHILS ABSOLUTE: 0 K/UL (ref 0–0.6)
EOSINOPHILS ABSOLUTE: 0.1 K/UL (ref 0–0.6)
EOSINOPHILS RELATIVE PERCENT: 0.2 %
EOSINOPHILS RELATIVE PERCENT: 0.8 %
GFR AFRICAN AMERICAN: >60
GFR NON-AFRICAN AMERICAN: >60
GLUCOSE BLD-MCNC: 104 MG/DL (ref 70–99)
GLUCOSE BLD-MCNC: 124 MG/DL (ref 70–99)
GLUCOSE BLD-MCNC: 85 MG/DL (ref 70–99)
GLUCOSE BLD-MCNC: 96 MG/DL (ref 70–99)
HCT VFR BLD CALC: 48.2 % (ref 40.5–52.5)
HCT VFR BLD CALC: 50 % (ref 40.5–52.5)
HEMOGLOBIN: 16 G/DL (ref 13.5–17.5)
HEMOGLOBIN: 16.7 G/DL (ref 13.5–17.5)
LYMPHOCYTES ABSOLUTE: 2.1 K/UL (ref 1–5.1)
LYMPHOCYTES ABSOLUTE: 2.1 K/UL (ref 1–5.1)
LYMPHOCYTES RELATIVE PERCENT: 27.7 %
LYMPHOCYTES RELATIVE PERCENT: 29.2 %
MCH RBC QN AUTO: 28.6 PG (ref 26–34)
MCH RBC QN AUTO: 28.6 PG (ref 26–34)
MCHC RBC AUTO-ENTMCNC: 33.3 G/DL (ref 31–36)
MCHC RBC AUTO-ENTMCNC: 33.3 G/DL (ref 31–36)
MCV RBC AUTO: 85.8 FL (ref 80–100)
MCV RBC AUTO: 85.8 FL (ref 80–100)
MONOCYTES ABSOLUTE: 0.8 K/UL (ref 0–1.3)
MONOCYTES ABSOLUTE: 1 K/UL (ref 0–1.3)
MONOCYTES RELATIVE PERCENT: 10.6 %
MONOCYTES RELATIVE PERCENT: 13.8 %
NEUTROPHILS ABSOLUTE: 3.9 K/UL (ref 1.7–7.7)
NEUTROPHILS ABSOLUTE: 4.6 K/UL (ref 1.7–7.7)
NEUTROPHILS RELATIVE PERCENT: 55.2 %
NEUTROPHILS RELATIVE PERCENT: 60.8 %
PDW BLD-RTO: 13.5 % (ref 12.4–15.4)
PDW BLD-RTO: 13.6 % (ref 12.4–15.4)
PERFORMED ON: ABNORMAL
PERFORMED ON: ABNORMAL
PERFORMED ON: NORMAL
PLATELET # BLD: 268 K/UL (ref 135–450)
PLATELET # BLD: 270 K/UL (ref 135–450)
PMV BLD AUTO: 8.7 FL (ref 5–10.5)
PMV BLD AUTO: 9 FL (ref 5–10.5)
POTASSIUM SERPL-SCNC: 3.3 MMOL/L (ref 3.5–5.1)
PROTEIN PROTEIN: 27 MG/DL
RBC # BLD: 5.61 M/UL (ref 4.2–5.9)
RBC # BLD: 5.83 M/UL (ref 4.2–5.9)
SODIUM BLD-SCNC: 143 MMOL/L (ref 136–145)
VANCOMYCIN TROUGH: 11.2 UG/ML (ref 10–20)
WBC # BLD: 7 K/UL (ref 4–11)
WBC # BLD: 7.5 K/UL (ref 4–11)

## 2021-12-08 PROCEDURE — 2580000003 HC RX 258: Performed by: INTERNAL MEDICINE

## 2021-12-08 PROCEDURE — 80202 ASSAY OF VANCOMYCIN: CPT

## 2021-12-08 PROCEDURE — 6370000000 HC RX 637 (ALT 250 FOR IP): Performed by: HOSPITALIST

## 2021-12-08 PROCEDURE — 6360000002 HC RX W HCPCS: Performed by: INTERNAL MEDICINE

## 2021-12-08 PROCEDURE — 1200000000 HC SEMI PRIVATE

## 2021-12-08 PROCEDURE — 80048 BASIC METABOLIC PNL TOTAL CA: CPT

## 2021-12-08 PROCEDURE — 6370000000 HC RX 637 (ALT 250 FOR IP): Performed by: INTERNAL MEDICINE

## 2021-12-08 PROCEDURE — 99233 SBSQ HOSP IP/OBS HIGH 50: CPT | Performed by: HOSPITALIST

## 2021-12-08 PROCEDURE — 94761 N-INVAS EAR/PLS OXIMETRY MLT: CPT

## 2021-12-08 PROCEDURE — 99232 SBSQ HOSP IP/OBS MODERATE 35: CPT | Performed by: INTERNAL MEDICINE

## 2021-12-08 PROCEDURE — 6370000000 HC RX 637 (ALT 250 FOR IP): Performed by: NURSE PRACTITIONER

## 2021-12-08 PROCEDURE — 84156 ASSAY OF PROTEIN URINE: CPT

## 2021-12-08 PROCEDURE — 36415 COLL VENOUS BLD VENIPUNCTURE: CPT

## 2021-12-08 PROCEDURE — 85025 COMPLETE CBC W/AUTO DIFF WBC: CPT

## 2021-12-08 RX ORDER — LISINOPRIL 5 MG/1
5 TABLET ORAL DAILY
Status: DISCONTINUED | OUTPATIENT
Start: 2021-12-08 | End: 2021-12-09 | Stop reason: HOSPADM

## 2021-12-08 RX ORDER — IBUPROFEN 400 MG/1
400 TABLET ORAL EVERY 6 HOURS PRN
Status: DISCONTINUED | OUTPATIENT
Start: 2021-12-08 | End: 2021-12-09 | Stop reason: HOSPADM

## 2021-12-08 RX ADMIN — HYDROXYZINE HYDROCHLORIDE 25 MG: 10 TABLET ORAL at 04:49

## 2021-12-08 RX ADMIN — CEFTRIAXONE 1000 MG: 1 INJECTION, POWDER, FOR SOLUTION INTRAMUSCULAR; INTRAVENOUS at 00:04

## 2021-12-08 RX ADMIN — METOPROLOL TARTRATE 100 MG: 50 TABLET, FILM COATED ORAL at 20:42

## 2021-12-08 RX ADMIN — Medication 1500 MG: at 12:14

## 2021-12-08 RX ADMIN — CEFTRIAXONE 1000 MG: 1 INJECTION, POWDER, FOR SOLUTION INTRAMUSCULAR; INTRAVENOUS at 13:07

## 2021-12-08 RX ADMIN — Medication 6 MG: at 01:38

## 2021-12-08 RX ADMIN — LEVOTHYROXINE SODIUM 125 MCG: 0.12 TABLET ORAL at 05:07

## 2021-12-08 RX ADMIN — HYDROXYZINE HYDROCHLORIDE 25 MG: 10 TABLET ORAL at 12:20

## 2021-12-08 RX ADMIN — METOPROLOL TARTRATE 100 MG: 50 TABLET, FILM COATED ORAL at 12:06

## 2021-12-08 RX ADMIN — FLUTICASONE PROPIONATE 2 SPRAY: 50 SPRAY, METERED NASAL at 12:06

## 2021-12-08 RX ADMIN — IBUPROFEN 400 MG: 400 TABLET, FILM COATED ORAL at 04:47

## 2021-12-08 RX ADMIN — ACETAMINOPHEN 650 MG: 325 TABLET ORAL at 01:38

## 2021-12-08 RX ADMIN — LISINOPRIL 5 MG: 5 TABLET ORAL at 12:06

## 2021-12-08 RX ADMIN — CEFTRIAXONE 1000 MG: 1 INJECTION, POWDER, FOR SOLUTION INTRAMUSCULAR; INTRAVENOUS at 22:17

## 2021-12-08 RX ADMIN — ENOXAPARIN SODIUM 40 MG: 100 INJECTION SUBCUTANEOUS at 12:05

## 2021-12-08 RX ADMIN — Medication 1500 MG: at 20:47

## 2021-12-08 ASSESSMENT — PAIN DESCRIPTION - LOCATION
LOCATION: KNEE
LOCATION: SHOULDER;BACK

## 2021-12-08 ASSESSMENT — PAIN - FUNCTIONAL ASSESSMENT
PAIN_FUNCTIONAL_ASSESSMENT: ACTIVITIES ARE NOT PREVENTED
PAIN_FUNCTIONAL_ASSESSMENT: ACTIVITIES ARE NOT PREVENTED

## 2021-12-08 ASSESSMENT — PAIN SCALES - GENERAL
PAINLEVEL_OUTOF10: 3
PAINLEVEL_OUTOF10: 0
PAINLEVEL_OUTOF10: 8

## 2021-12-08 ASSESSMENT — PAIN DESCRIPTION - FREQUENCY
FREQUENCY: CONTINUOUS
FREQUENCY: CONTINUOUS

## 2021-12-08 ASSESSMENT — PAIN DESCRIPTION - PROGRESSION
CLINICAL_PROGRESSION: NOT CHANGED
CLINICAL_PROGRESSION: NOT CHANGED

## 2021-12-08 ASSESSMENT — PAIN DESCRIPTION - DESCRIPTORS
DESCRIPTORS: ACHING
DESCRIPTORS: ACHING

## 2021-12-08 ASSESSMENT — PAIN DESCRIPTION - ORIENTATION
ORIENTATION: RIGHT
ORIENTATION: RIGHT

## 2021-12-08 ASSESSMENT — PAIN DESCRIPTION - ONSET
ONSET: ON-GOING
ONSET: ON-GOING

## 2021-12-08 ASSESSMENT — PAIN DESCRIPTION - PAIN TYPE
TYPE: ACUTE PAIN
TYPE: ACUTE PAIN

## 2021-12-08 NOTE — PROGRESS NOTES
Patient's IV administering vancomycin infiltrated - site is swollen, red and painful. Administered ice packs via pharmacy advice and notified hospitalist via perfect serve. No new orders.

## 2021-12-08 NOTE — CARE COORDINATION
INITIAL CASE MANAGEMENT ASSESSMENT    Reviewed chart, met with patient to assess possible discharge needs. Explained Case Management role/services. SW interviewed patient's wife via telephone today. Living Situation: Patient resides in a single family home with his wife, children (minors and adults) and some pets (wife would not specify which kind of pets they have). There is entry level admission. Prior to medical admission wife reports that he had no trouble getting in and out of the property. ADLs: Prior to medical admission wife reports that he received assistance with housekeeping and laundry. She stated that she doesn't anticipate any needs at discharge. DME: Prior to medical admission wife reports that had a cane but does not use it. She stated that she doesn't anticipate any needs at discharge, however, he is on IVAB. We will continue to monitor. PT/OT Recs: Not ordered     Active Services: Prior to medical admission wife reports that he had no active services in place. She stated that she would like a [facility] list of in network therapists prior to discharge. She would like us to wait until psychiatry to see him before taking the list to bedside. Transportation: Prior to medical admission wife reports that she drove him to and from medical appointments and errands. She stated that her or their 24year old daughter will likely transport him home at discharge. Medications: Patient receives medications from [address] [place] [address] on [place]. At this time there are no issues with access or affordability. PCP: No primary care provider on file. Wife would like our assistance with setting a PCP appointment prior to discharge. HD/PD: N/A    PLAN/COMMENTS:   1) Monitor for IVAB needs. 2) Wife requesting a psych consult. SW provided contact information for patient or family to call with any questions. SW will follow and assist as needed. Respectfully submittedAnastacia INOCENCIO Morel  [facility]   [phone]    Electronically signed by INOCENCIO Robb on 12/8/2021 at 1:21 PM

## 2021-12-08 NOTE — CARE COORDINATION
SW attempted to meet with patient at bedside today to review intake assessment, however, there was another gentleman at bedside with him today. SW verified patient name and he asked if I could call his wife instead. TAMMY wrote this writer's number and name on the board in case we are unable to connect today. Respectfully submitted,    INOCENCIO Lopez  [facility]   [phone]    Electronically signed by INOCENCIO Fernando on 12/8/2021 at 1:20 PM

## 2021-12-08 NOTE — PROGRESS NOTES
Office: [phone]       Fax: [phone]      Nephrology Progress Note        Patient's Name: Alicia Kline  Admit Date: 12/6/2021  Date of Visit: 12/8/2021    Reason for Consult:  ALFREDO      History of Present Illness:      Alicia Kline is a 54 y.o. male with PMHx of hypertension, anxiety, ADHD who was hospitalized on 12/6/2021 with complaints of weakness  the patient was admitted to the hospital here at [facility] from 12/3/2021 -12/5/2021 for acute encephalopathy. Patient was noted to have mastoiditis and was discharged home on Augmentin x10 days and a Medrol Dosepak  Currently on insulin infusion   No history of DM  No hematuria, dysuria  NSAID use: Denies   IV contrast: None recent   Home meds reviewed    INTERVAL HISTORY    Feels same  Shortness of breath: No   UOP: Fair  Creat: trending down        Medications:    Allergies:  Bactrim [sulfamethoxazole-trimethoprim]    Scheduled Meds:   metoprolol tartrate  100 mg Oral BID    levothyroxine  125 mcg Oral Daily    fluticasone  2 spray Each Nostril Daily    enoxaparin  40 mg SubCUTAneous Daily    hydrOXYzine  50 mg Oral Once    vancomycin  1,500 mg IntraVENous Q12H    vancomycin (VANCOCIN) intermittent dosing (placeholder)   Other RX Placeholder    insulin lispro  0-6 Units SubCUTAneous TID WC    insulin lispro  0-3 Units SubCUTAneous Nightly    cefTRIAXone (ROCEPHIN) IV  1,000 mg IntraVENous Q12H     Continuous Infusions:   dextrose 5 % and 0.45 % NaCl Stopped (12/07/21 1422)    sodium bicarbonate infusion 150 mL/hr at 12/07/21 2310       Labs:  CBC:   Recent Labs     12/05/21  0644 12/06/21  2151   WBC 8.3 16.4*   HGB 16.8 17.4    326     Ca/Mg/Phos:   Recent Labs     12/07/21  0328 12/07/21  0927 12/07/21  1339   CALCIUM 7.9* 8.4 8.4   MG 1.90 2.20 2.00   PHOS 1.9* 1.1* 1.8*     UA:  Recent Labs 12/07/21  0111   COLORU DK YELLOW   CLARITYU CLOUDY*   GLUCOSEU 500*   BILIRUBINUR SMALL*   KETUA TRACE*   SPECGRAV 1.029   BLOODU Negative   PHUR 5.0   PROTEINU 30*   UROBILINOGEN 0.2   NITRU Negative   LEUKOCYTESUR Negative   LABMICR YES   URINETYPE NotGiven      Urine Microscopic:   Recent Labs     12/07/21  0111   HYALCAST 11-20*   WBCUA 8*   RBCUA 0-2   EPIU 16*     Urine Chemistry:   Recent Labs     12/07/21  0111   LABCREA 390.5*   NAUR 69           Objective:     Vitals: BP (!) 185/109   Pulse 83   Temp 97.5 °F (36.4 °C) (Oral)   Resp 18   Wt 222 lb 14.2 oz (101.1 kg)   SpO2 94%   BMI 32.91 kg/m²    Wt Readings from Last 3 Encounters:   12/08/21 222 lb 14.2 oz (101.1 kg)   12/05/21 217 lb 12.8 oz (98.8 kg)   06/22/21 220 lb 2 oz (99.8 kg)      24HR INTAKE/OUTPUT:      Intake/Output Summary (Last 24 hours) at 12/8/2021 5012  Last data filed at 12/8/2021 0447  Gross per 24 hour   Intake 3402.16 ml   Output 2500 ml   Net 902.16 ml     Constitutional:  awake, NAD  HEENT:  MMM, No icterus  Neck: no bruits, No JVD  Cardiovascular:  reg rhythm  Respiratory: CTA, no crackles  Abdomen:  +BS, soft, NT, ND  Ext: no lower extremity edema  CNS: alert, no agitation    IMAGING:  CT HEAD WO CONTRAST   Final Result   Normal intracranial exam.      Minor bilateral ethmoid sinus mucosal disease markedly improved from the   comparison study 3 days earlier. XR CHEST (2 VW)   Final Result      No acute findings in the chest.             Assessment :     1.  ALFREDO  -Non-Oliguric  -Baseline creat: <1 Now 1.8->1.1-->0.7  -UA: glucosuria, hyaline cast, prot +  -UPC 0.07  -Volume: Hypovolemic  -Electrolytes: Hypokalemia  -Acid-Base: NAGMA    Recent Labs     12/05/21  0644 12/06/21  2151 12/07/21  0328 12/07/21  0927 12/07/21  1339   BUN 15 18 13 9 8   CREATININE 0.9 1.8* 1.1 0.8* 0.7*     Recent Labs     12/05/21  0644 12/06/21  2151 12/07/21  0035 12/07/21  0328 12/07/21  0927 12/07/21  1339    134*  --  139 137 138   K 4.1 2.4*  --  3.0* 3.4* 3.6   CO2 22 14*  --  16* 17* 16*   MG  --  2.30 2.00 1.90 2.20 2.00         2. HTN  -Blood pressure high    BP Readings from Last 1 Encounters:   12/08/21 (!) 185/109       3. Sepsis    4. Hyperglycemia    Plan:     - may dc IVF  - start Lisinopril  - Antimicrobials per primary team/ID  - culture  - replace K  - replace phos  - Monitor BMP    -Monitor I/O, UOP  -Maintain MAP>65  -Avoid nephrotoxin, if able. -Dose meds to current eGFR    Spoke to RN, Esthela Stanton MD     Thank you for allowing us to participate in care of Sena Shaikh . We will continue to follow. Feel free to contact me with any questions.       Desi Lieberman MD  12/8/2021    [facility]  [address]  Office : [phone]  Fax :[phone]

## 2021-12-08 NOTE — PROGRESS NOTES
Pt  aarived via stretcher to unit. Pt oriented to room and unit. Pt left to rest in recliner with call light within reach. Chair alarm declined.

## 2021-12-08 NOTE — PROGRESS NOTES
Hospitalist Progress Note      PCP: No primary care provider on file. Date of Admission: 12/6/2021      Subjective:     Patient has been very irritable/angry this morning. . Seems to be upset about everything. .. No fevers, chills or rigors      Medications:  Reviewed    Infusion Medications    dextrose 5 % and 0.45 % NaCl Stopped (12/07/21 1422)     Scheduled Medications    lisinopril  5 mg Oral Daily    metoprolol tartrate  100 mg Oral BID    levothyroxine  125 mcg Oral Daily    fluticasone  2 spray Each Nostril Daily    enoxaparin  40 mg SubCUTAneous Daily    hydrOXYzine  50 mg Oral Once    vancomycin  1,500 mg IntraVENous Q12H    vancomycin (VANCOCIN) intermittent dosing (placeholder)   Other RX Placeholder    insulin lispro  0-6 Units SubCUTAneous TID WC    insulin lispro  0-3 Units SubCUTAneous Nightly    cefTRIAXone (ROCEPHIN) IV  1,000 mg IntraVENous Q12H     PRN Meds: ibuprofen, dextrose, potassium chloride, magnesium sulfate, polyethylene glycol, dextrose 5 % and 0.45 % NaCl, acetaminophen, hydrOXYzine, melatonin, potassium chloride      Intake/Output Summary (Last 24 hours) at 12/8/2021 1556  Last data filed at 12/8/2021 0447  Gross per 24 hour   Intake 3162.16 ml   Output 700 ml   Net 2462.16 ml       Physical Exam Performed:    BP (!) 162/96   Pulse 65   Temp 98 °F (36.7 °C) (Oral)   Resp 16   Wt 222 lb 14.2 oz (101.1 kg)   SpO2 94%   BMI 32.91 kg/m²     General appearance: No apparent distress, appears stated age and cooperative. HEENT: Pupils equal, round, and reactive to light. Conjunctivae/corneas clear. Neck: Supple, with full range of motion. No jugular venous distention. Trachea midline. Respiratory:  Normal respiratory effort. Clear to auscultation, bilaterally without Rales/Wheezes/Rhonchi. Cardiovascular: Regular rate and rhythm with normal S1/S2 without murmurs, rubs or gallops. Abdomen: Soft, non-tender, non-distended with normal bowel sounds.   Musculoskeletal: vancomycin and ceftriaxone-- -follow-up on culture data-ID consult appreciated         -Lactic acidosis likely due to sepsis. . Improved with fluids and antibiotics--        -Stress-induced [id number]-ktymmoat-vi DKA--hemoglobin A1c 5.8--- this is due to recent steroids and stress from sepsis--- discontinued insulin drip 12/7 -discontinue sliding scale     -Acute kidney injury--prerenal-resolved with fluids-     -hypokalemia--continue to replete     -Hypophosphatemia--repleted     -Chronic sinonasal and mastoid inflammatory disease with left eustachian tube dysfunction and effusion--- per recent ENT consult, there is no evidence of acute infection --- Medrol pack plus Augmentin was given empirically for 10 days and plan was to follow-up in the clinic . . CT 12/7/21 shows minor bilateral ethmoid sinus mucosal disease, markedly improved from 3 days prior     -Hypothyroidism-continue Synthroid     -Essential hypertension-uncontrolled-metoprolol and lisinopril resumed    -Irritable mood/anger outburst--- wife requesting for psych consult case management--assess for underlying mood disorder      DVT Prophylaxis:   Diet: ADULT DIET;  Regular  Code Status: Full Code        Lucero Sanchez MD

## 2021-12-08 NOTE — PROGRESS NOTES
Infectious Disease Follow up Notes  Admit Date: 12/6/2021  Hospital Day: 3    Antibiotics : IV Vancomycin  IV Ceftriaxone       CHIEF COMPLAINT:     WBC elevation  Lactic acidosis  Left mastoiditis   ALFREDO     Subjective interval History :  54 y.o. Man with HTN, ADHD, eX smoker and Polycythemia admitted with chills, nausea and not feeling himself - wife notice he was slow and having tremors, ? Rigors but no fevers recorded and he was very flushed - was d/c from [facility] on 12/5 for Left ear pain and early mastoiditis on the CT scan placed on Medrol dose pack and Augmentin seen by ENT on last admit. He has taken steroids taper for 1 day and has taken Augmentin as prescribed now WBC elevation, Lactic acid at 9 and Creat elevation- Left ear symptoms are better no fevers no cough. Blood cx negative, no unusual exposures noted. He was admitted to ICU due to concern for sepsis, We are consulted for recommendations. Wife at bed side has provided some details. Interval History :  Feels bit better no fevers no tremors no chills mentation back to normal and left ear symptoms better flushing  On the face improved       Past Medical History:    Past Medical History:   Diagnosis Date    ADHD (attention deficit hyperactivity disorder)     Anxiety     Chronic back pain     Depression     Erectile dysfunction     Headache     Hypertension     Hypothyroidism     Low testosterone     Low testosterone level in male     Neuropathy     PTSD (post-traumatic stress disorder)     Thyroid disease        Past Surgical History:    Past Surgical History:   Procedure Laterality Date    BICEPS TENDON REPAIR Left     SHOULDER SURGERY Right        Current Medications:    No outpatient medications have been marked as taking for the 12/6/21 encounter [facility] Encounter).        Allergies:  Bactrim [sulfamethoxazole-trimethoprim]    Immunizations : Immunization History   Administered Date(s) Administered    COVID-19, Hilda Hull, Primary or Immunocompromised, PF, 100mcg/0.5mL 10/21/2021    DTaP 2015       Social History:   Social History     Tobacco Use    Smoking status: Former Smoker     Types: Cigarettes     Quit date: 12/3/2012     Years since quittin.0    Smokeless tobacco: Never Used   Vaping Use    Vaping Use: Never used   Substance Use Topics    Alcohol use: Yes     Comment: occoasional use/SOCIALLY    Drug use: Not Currently     Social History     Tobacco Use   Smoking Status Former Smoker    Types: Cigarettes    Quit date: 12/3/2012    Years since quittin.0   Smokeless Tobacco Never Used      Family History   Problem Relation Age of Onset    Diabetes Maternal Grandmother         REVIEW OF SYSTEMS:    Constitutional:  negative for fevers, chills, night sweats  Eyes:  negative for blurred vision, eye discharge, visual disturbance   HEENT:  negative for hearing loss, ear drainage,nasal congestion  Respiratory:  negative for cough, shortness of breath or hemoptysis   Cardiovascular:  negative for chest pain, palpitations, syncope  Gastrointestinal:  negative for nausea, vomiting, diarrhea, constipation, abdominal pain  Genitourinary:  negative for frequency, dysuria, urinary incontinence, hematuria  Hematologic/Lymphatic:  negative for easy bruising, bleeding and lymphadenopathy  Allergic/Immunologic:  negative for recurrent infections, angioedema, anaphylaxis   Endocrine:  negative for weight changes, polyuria, polydipsia and polyphagia  Musculoskeletal:  negative for joint  pain, swelling, decreased range of motion  Integumentary: No rashes, skin lesions  Neurological:  negative for headaches, slurred speech, unilateral weakness  Psychiatric: negative for hallucinations,confusion,agitation.                 PHYSICAL EXAM:      Vitals:    BP (!) 162/96   Pulse 65   Temp 98 °F (36.7 °C) (Oral)   Resp 16   Wt 222 lb 14.2 oz (101.1 kg)   SpO2 94%   BMI 32.91 kg/m²     General Appearance: alert,in no acute distress, no pallor, no icterus FACIAL flushing improved   Skin: warm and dry, no rash or erythema  Head: normocephalic and atraumatic  Eyes: pupils equal, round, and reactive to light, conjunctivae normal  ENT: tympanic membrane, external ear and ear canal normal bilaterally, nose without deformity, nasal mucosa and turbinates normal without polyps  Neck: supple and non-tender without mass, no thyromegaly  no cervical lymphadenopathy  Pulmonary/Chest: clear to auscultation bilaterally- no wheezes, rales or rhonchi, normal air movement, no respiratory distress  Cardiovascular: normal rate, regular rhythm, normal S1 and S2, no murmurs, rubs, clicks, or gallops, no carotid bruits  Abdomen: soft, non-tender, non-distended, normal bowel sounds, no masses or organomegaly  Extremities: no cyanosis, clubbing or edema  Musculoskeletal: normal range of motion, no joint swelling, deformity or tenderness  Integumentary: No rashes, no abnormal skin lesions, no petechiae  Neurologic: reflexes normal and symmetric, no cranial nerve deficit  Psych:  Orientation, sensorium, mood normal  Lines:  IV      Data Review:    CBC:   Lab Results   Component Value Date    WBC 7.5 12/08/2021    HGB 16.0 12/08/2021    HCT 48.2 12/08/2021    MCV 85.8 12/08/2021     12/08/2021     RENAL:   Lab Results   Component Value Date    CREATININE 0.8 (L) 12/08/2021    BUN 7 12/08/2021     12/08/2021    K 3.3 (L) 12/08/2021     12/08/2021    CO2 23 12/08/2021     SED RATE:   Lab Results   Component Value Date    SEDRATE 1 10/28/2019     CK:   Lab Results   Component Value Date    CKTOTAL 240 12/06/2021     CRP: No results found for: CRP  Hepatic Function Panel:   Lab Results   Component Value Date    ALKPHOS 107 12/06/2021    ALT 41 12/06/2021    AST 30 12/06/2021    PROT 7.3 12/06/2021    BILITOT 0.3 12/06/2021    LABALBU 4.2 12/06/2021     UA:  Lab Results Component Value Date    COLORU DK YELLOW 12/07/2021    CLARITYU CLOUDY 12/07/2021    GLUCOSEU 500 12/07/2021    BILIRUBINUR SMALL 12/07/2021    KETUA TRACE 12/07/2021    SPECGRAV 1.029 12/07/2021    BLOODU Negative 12/07/2021    PHUR 5.0 12/07/2021    PROTEINU 30 12/07/2021    UROBILINOGEN 0.2 12/07/2021    NITRU Negative 12/07/2021    LEUKOCYTESUR Negative 12/07/2021    LABMICR YES 12/07/2021    URINETYPE NotGiven 12/07/2021      Urine Microscopic:   Lab Results   Component Value Date    HYALCAST 11-20 12/07/2021    WBCUA 8 12/07/2021    RBCUA 0-2 12/07/2021    EPIU 16 12/07/2021     Urine Reflex to Culture:   Lab Results   Component Value Date    URRFLXCULT Not Indicated 12/07/2021      Ref Range & Units 12/3/21 1707   Amphetamine Screen, Urine Negative <1000ng/mL Neg    Barbiturate Screen, Ur Negative <200 ng/mL Neg    Benzodiazepine Screen, Urine Negative <200 ng/mL Neg    Cannabinoid Scrn, Ur Negative <50 ng/mL POSITIVE Abnormal     Cocaine Metabolite Screen, Urine Negative <300 ng/mL Neg    Opiate Scrn, Ur Negative <300 ng/mL POSITIVE Abnormal     Comment: \"Therapeutic levels of pain medication, especially oxycontin and synthetic   opioids, may not be detected by this Methodology. Pain management screen   panel  Drug panel-PM-Hi Res Ur, Interp (PAIN) should be considered for drug   monitoring \". PCP Screen, Urine Negative <25 ng/mL Neg    Methadone Screen, Urine Negative <300 ng/mL Neg    Propoxyphene Scrn, Ur Negative <300 ng/mL Neg    Oxycodone Urine Negative <100 ng/ml Neg    pH, UA   6.0             MICRO: cultures reviewed and updated by me   Blood Culture:   Lab Results   Component Value Date    [facility]  12/06/2021     No Growth to date. Any change in status will be called. BLOODCULT2  12/06/2021     No Growth to date. Any change in status will be called.        Respiratory Culture:  No results found for: Juan Ramon Salazar  AFB:No results found for: AFBSMEAR  Viral Culture:  No results found for: COVID19  Urine Culture: No results for input(s): LABURIN in the last 72 hours. IMAGING:    CT HEAD WO CONTRAST   Final Result   Normal intracranial exam.      Minor bilateral ethmoid sinus mucosal disease markedly improved from the   comparison study 3 days earlier. XR CHEST (2 VW)   Final Result      No acute findings in the chest.             MRI Brain : 12/4/21     FINDINGS:   INTRACRANIAL STRUCTURES/VENTRICLES: There is no acute infarct. No mass effect   or midline shift. No evidence of an acute intracranial hemorrhage.  The   ventricles and sulci are normal in size and configuration.  The   sellar/suprasellar regions appear unremarkable.  The normal signal voids   within the major intracranial vessels appear maintained. There are scattered   periventricular and deep subcortical white matter T2/FLAIR hyperintensities,   consistent with microangiopathic change.  There is mild parenchymal volume   loss.       ORBITS: The visualized portion of the orbits demonstrate no acute abnormality.       SINUSES: Scattered mucosal thickening in the paranasal sinuses.  Left mastoid   air cell effusion.       BONES/SOFT TISSUES: The bone marrow signal intensity appears normal. The soft   tissues demonstrate no acute abnormality.           Impression   1. No acute intracranial abnormality.  No acute infarction. 2. Left mastoid air cell effusion, which could be seen with mastoiditis.    Clinical correlation is recommended.             All the pertinent images and reports for the current Hospitalization were reviewed by me     Scheduled Meds:   lisinopril  5 mg Oral Daily    metoprolol tartrate  100 mg Oral BID    levothyroxine  125 mcg Oral Daily    fluticasone  2 spray Each Nostril Daily    enoxaparin  40 mg SubCUTAneous Daily    hydrOXYzine  50 mg Oral Once    vancomycin  1,500 mg IntraVENous Q12H    vancomycin (VANCOCIN) intermittent dosing (placeholder)   Other RX Placeholder    insulin lispro  0-6 Units SubCUTAneous TID     insulin lispro  0-3 Units SubCUTAneous Nightly    cefTRIAXone (ROCEPHIN) IV  1,000 mg IntraVENous Q12H       Continuous Infusions:   dextrose 5 % and 0.45 % NaCl Stopped (12/07/21 1422)       PRN Meds:  ibuprofen, dextrose, potassium chloride, magnesium sulfate, polyethylene glycol, dextrose 5 % and 0.45 % NaCl, acetaminophen, hydrOXYzine, melatonin, potassium chloride      Assessment:     Patient Active Problem List   Diagnosis    Low testosterone in male    Fatigue    Hypothyroidism    Insomnia    Depression    Hypertension    AMS (altered mental status)    Sepsis ([facility] [address]       SIRS at presentation   WBC elevation resolved   Lactic acidosis resolved  Hypokalemia  ALFREDO+ - creat normalized  CT head -ve  Recent Left mastoiditis and CT head improving   CK normal  BG elevation   CXR -VE  Procal Normal   Blood cx in  Process NGTD  Polycythemia+         Presentation some what atypical -but given the lab abnormality will need to check Blood cultures and trend lactic acid  now improved and All cx negative and clinically improved    Etiology of the current presentation is not clear and Blood cx   Negative and lab abnormalities have improved  - Left mastoiditis less likely to be the culprit for sepsis       Labs, Microbiology, Radiology and all the pertinent results from current hospitalization and  care every where were reviewed  by me as a part of the evaluation   Plan:   1. Cont IV Vancomycin x 1250 mg Q 12 hrs  2. Cont IV Ceftriaxone x 1 gm Q 12 hrs  3. Trend WBC and Lactic acid improved   4. CNS infection seems less likely  given the exam we can hold off on LP  5. procal not much elevation   6. HbA1c normal    7. He refused COVID 19 test per RN   8. oK FOR d/c on ORAL Levofloxacin x 500 mg daily x 5 days  9.  He can follow up with ENT as per previous plan        Discussed with patient/Family and Nursing staff  D/w    D/w wife over the phone per patients request     Thanks for allowing me to participate in your patient's care and please call me with any questions or concerns.     Cecelia Mike MD  Infectious Disease  [facility] Physician  Phone: [phone]   Fax : [phone]

## 2021-12-09 VITALS
SYSTOLIC BLOOD PRESSURE: 145 MMHG | HEART RATE: 89 BPM | RESPIRATION RATE: 16 BRPM | WEIGHT: 218.26 LBS | TEMPERATURE: 98.1 F | OXYGEN SATURATION: 95 % | DIASTOLIC BLOOD PRESSURE: 88 MMHG | BODY MASS INDEX: 32.23 KG/M2

## 2021-12-09 DIAGNOSIS — H91.90 HEARING LOSS, UNSPECIFIED HEARING LOSS TYPE, UNSPECIFIED LATERALITY: Primary | ICD-10-CM

## 2021-12-09 LAB
ANION GAP SERPL CALCULATED.3IONS-SCNC: 13 MMOL/L (ref 3–16)
BUN BLDV-MCNC: 11 MG/DL (ref 7–20)
CALCIUM SERPL-MCNC: 9.4 MG/DL (ref 8.3–10.6)
CHLORIDE BLD-SCNC: 102 MMOL/L (ref 99–110)
CO2: 24 MMOL/L (ref 21–32)
CREAT SERPL-MCNC: 0.9 MG/DL (ref 0.9–1.3)
GFR AFRICAN AMERICAN: >60
GFR NON-AFRICAN AMERICAN: >60
GLUCOSE BLD-MCNC: 89 MG/DL (ref 70–99)
GLUCOSE BLD-MCNC: 91 MG/DL (ref 70–99)
PERFORMED ON: NORMAL
PHOSPHORUS: 2.5 MG/DL (ref 2.5–4.9)
POTASSIUM SERPL-SCNC: 3.6 MMOL/L (ref 3.5–5.1)
SODIUM BLD-SCNC: 139 MMOL/L (ref 136–145)

## 2021-12-09 PROCEDURE — 84100 ASSAY OF PHOSPHORUS: CPT

## 2021-12-09 PROCEDURE — 6370000000 HC RX 637 (ALT 250 FOR IP): Performed by: INTERNAL MEDICINE

## 2021-12-09 PROCEDURE — 36415 COLL VENOUS BLD VENIPUNCTURE: CPT

## 2021-12-09 PROCEDURE — 99232 SBSQ HOSP IP/OBS MODERATE 35: CPT | Performed by: HOSPITALIST

## 2021-12-09 PROCEDURE — 80048 BASIC METABOLIC PNL TOTAL CA: CPT

## 2021-12-09 PROCEDURE — 94761 N-INVAS EAR/PLS OXIMETRY MLT: CPT

## 2021-12-09 RX ORDER — LEVOFLOXACIN 500 MG/1
500 TABLET, FILM COATED ORAL DAILY
Qty: 5 TABLET | Refills: 0 | Status: SHIPPED | OUTPATIENT
Start: 2021-12-09 | End: 2021-12-14

## 2021-12-09 RX ORDER — LEVOFLOXACIN 500 MG/1
500 TABLET, FILM COATED ORAL DAILY
Status: DISCONTINUED | OUTPATIENT
Start: 2021-12-10 | End: 2021-12-09 | Stop reason: HOSPADM

## 2021-12-09 RX ORDER — LISINOPRIL 5 MG/1
5 TABLET ORAL DAILY
Qty: 30 TABLET | Refills: 3 | Status: SHIPPED | OUTPATIENT
Start: 2021-12-09

## 2021-12-09 RX ADMIN — ACETAMINOPHEN 325 MG: 325 TABLET ORAL at 08:16

## 2021-12-09 RX ADMIN — LEVOTHYROXINE SODIUM 125 MCG: 0.12 TABLET ORAL at 07:12

## 2021-12-09 ASSESSMENT — PAIN DESCRIPTION - PAIN TYPE: TYPE: ACUTE PAIN

## 2021-12-09 ASSESSMENT — PAIN SCALES - GENERAL
PAINLEVEL_OUTOF10: 0
PAINLEVEL_OUTOF10: 0
PAINLEVEL_OUTOF10: 3
PAINLEVEL_OUTOF10: 3
PAINLEVEL_OUTOF10: 0
PAINLEVEL_OUTOF10: 0

## 2021-12-09 ASSESSMENT — PAIN DESCRIPTION - PROGRESSION
CLINICAL_PROGRESSION: NOT CHANGED

## 2021-12-09 ASSESSMENT — PAIN DESCRIPTION - ORIENTATION: ORIENTATION: RIGHT;LEFT

## 2021-12-09 ASSESSMENT — PAIN DESCRIPTION - DESCRIPTORS: DESCRIPTORS: ACHING

## 2021-12-09 ASSESSMENT — PAIN DESCRIPTION - ONSET: ONSET: ON-GOING

## 2021-12-09 ASSESSMENT — PAIN DESCRIPTION - FREQUENCY: FREQUENCY: CONTINUOUS

## 2021-12-09 ASSESSMENT — PAIN DESCRIPTION - LOCATION: LOCATION: HEAD

## 2021-12-09 NOTE — PROGRESS NOTES
Pt noted pacing in room. Pt states he is having pain in arm from infusion. Pt offered tylenol or ibuprofen for pain. Pt declined. IV removed from wrist per patient request. Pt states arm is  3 x bigger. No swelling or redness noted at this time. Pt left to rest with call light within reach.

## 2021-12-09 NOTE — PROGRESS NOTES
Patient's wife at bedside, stating \" I'm taking him Disha here, unless I see a doctor now\" Dr. Cherylyn Castleman sent a Perfect Serve

## 2021-12-09 NOTE — CARE COORDINATION
CASE MANAGEMENT DISCHARGE SUMMARY:    DISCHARGE DATE: 12/09/21    DISCHARGED TO HOME     TRANSPORTATION: wife             TIME: her at bedside    Electronically signed by Wilner Bell RN on 12/9/2021 at 1:36 PM

## 2021-12-09 NOTE — PROGRESS NOTES
Pt states he is having pain/numbness to his right hand after a blood draw from lab. Pt states \"I think they hit a nerve. \" message sent to Dr Sera Otero via perfect serve, update given. No new orders at this time. Will continue to monitor.

## 2021-12-09 NOTE — PROGRESS NOTES
Discharge education completed, with patient's wife, at patient' request. Copy of instructions given to wife. New home medication education completed, discussed indications and adverse reactions. Patient discharged to home per private.

## 2021-12-09 NOTE — PROGRESS NOTES
Office: [phone]       Fax: [phone]      Nephrology Progress Note        Patient's Name: Sade Elder  Admit Date: 12/6/2021  Date of Visit: 12/9/2021    Reason for Consult:  ALFREDO      History of Present Illness:      Sade Elder is a 54 y.o. male with PMHx of hypertension, anxiety, ADHD who was hospitalized on 12/6/2021 with complaints of weakness  the patient was admitted to the hospital here at [facility] from 12/3/2021 -12/5/2021 for acute encephalopathy. Patient was noted to have mastoiditis and was discharged home on Augmentin x10 days and a Medrol Dosepak  Currently on insulin infusion   No history of DM  No hematuria, dysuria  NSAID use: Denies   IV contrast: None recent   Home meds reviewed    INTERVAL HISTORY    Feels better  Shortness of breath: No   UOP: Fair  Creat: trending down        Medications: Allergies:  Bactrim [sulfamethoxazole-trimethoprim]    Scheduled Meds:   lisinopril  5 mg Oral Daily    metoprolol tartrate  100 mg Oral BID    levothyroxine  125 mcg Oral Daily    fluticasone  2 spray Each Nostril Daily    enoxaparin  40 mg SubCUTAneous Daily    hydrOXYzine  50 mg Oral Once    vancomycin  1,500 mg IntraVENous Q12H    vancomycin (VANCOCIN) intermittent dosing (placeholder)   Other RX Placeholder    cefTRIAXone (ROCEPHIN) IV  1,000 mg IntraVENous Q12H     Continuous Infusions:   dextrose 5 % and 0.45 % NaCl Stopped (12/07/21 1422)       Labs:  CBC:   Recent Labs     12/06/21  2151 12/08/21  0831 12/08/21  1708   WBC 16.4* 7.5 7.0   HGB 17.4 16.0 16.7    270 268     Ca/Mg/Phos:   Recent Labs     12/07/21  0328 12/07/21  0328 12/07/21  0927 12/07/21  1339 12/08/21  0831   CALCIUM 7.9*   < > 8.4 8.4 8.8   MG 1.90  --  2.20 2.00  --    PHOS 1.9*  --  1.1* 1.8*  --     < > = values in this interval not displayed. UA:  Recent Labs     12/07/21  0111   COLORU DK YELLOW   CLARITYU CLOUDY*   GLUCOSEU 500*   BILIRUBINUR SMALL*   KETUA TRACE*   SPECGRAV 1.029   BLOODU Negative   PHUR 5.0   PROTEINU 30*   UROBILINOGEN 0.2   NITRU Negative   LEUKOCYTESUR Negative   LABMICR YES   URINETYPE NotGiven      Urine Microscopic:   Recent Labs     12/07/21  0111   HYALCAST 11-20*   WBCUA 8*   RBCUA 0-2   EPIU 16*     Urine Chemistry:   Recent Labs     12/07/21  0111 12/08/21  0111   LABCREA 390.5*  --    PROTEINUR  --  27.00*   NAUR 69  --            Objective:     Vitals: BP (!) 145/88   Pulse 89   Temp 98.1 °F (36.7 °C) (Oral)   Resp 16   Wt 218 lb 4.1 oz (99 kg)   SpO2 95%   BMI 32.23 kg/m²    Wt Readings from Last 3 Encounters:   12/08/21 218 lb 4.1 oz (99 kg)   12/05/21 217 lb 12.8 oz (98.8 kg)   06/22/21 220 lb 2 oz (99.8 kg)      24HR INTAKE/OUTPUT:      Intake/Output Summary (Last 24 hours) at 12/9/2021 6408  Last data filed at 12/8/2021 1825  Gross per 24 hour   Intake 480 ml   Output --   Net 480 ml     Constitutional:  awake, NAD  HEENT:  MMM, No icterus  Neck: no bruits, No JVD  Cardiovascular:  reg rhythm  Respiratory: CTA, no crackles  Abdomen:  +BS, soft, NT, ND  Ext: no lower extremity edema  CNS: alert, no agitation    IMAGING:  CT HEAD WO CONTRAST   Final Result   Normal intracranial exam.      Minor bilateral ethmoid sinus mucosal disease markedly improved from the   comparison study 3 days earlier. XR CHEST (2 VW)   Final Result      No acute findings in the chest.             Assessment :     1.  ALFREDO  -Non-Oliguric  -Baseline creat: <1 Now 1.8->1.1-->0.7  -UA: glucosuria, hyaline cast, prot +  -UPC 0.07  -Volume: Hypovolemic  -Electrolytes: No Dyskalemia  -Acid-Base: AGMA    Recent Labs     12/07/21  0328 12/07/21  0927 12/07/21  1339 12/08/21  0831 12/09/21  0914   BUN 13 9 8 7 11   CREATININE 1.1 0.8* 0.7* 0.8* 0.9     Recent Labs     12/06/21  2151 12/06/21  2151 12/07/21  0035 12/07/21  8287 12/07/21  0927 12/07/21  1339 12/08/21  0831 12/09/21  0914   *   < >  --  139 137 138 143 139   K 2.4*  --   --  3.0* 3.4* 3.6 3.3* 3.6   CO2 14*   < >  --  16* 17* 16* 23 24   MG 2.30  --  2.00 1.90 2.20 2.00  --   --     < > = values in this interval not displayed. 2. HTN  -Blood pressure high    BP Readings from Last 1 Encounters:   12/09/21 (!) 145/88       3. Sepsis    4. Hyperglycemia    Plan:     - continue Lisinopril  - Antimicrobials per primary team/ID  - culture  - Monitor BMP    -Monitor I/O, UOP  -Maintain MAP>65  -Avoid nephrotoxin, if able. -Dose meds to current eGFR    Spoke to  Glen Baker MD     Thank you for allowing us to participate in care of Germania Olsen . We will continue to follow. Feel free to contact me with any questions.       Kaveh Sinclair MD  12/9/2021    [facility] [address] [place] [address]  Office : [phone]  Fax :[phone]

## 2021-12-09 NOTE — PROGRESS NOTES
Patient uncooperative and anxious initially, later somewhat cooperative, after being  reassured. Continues to refuse am medications. Patient refuses assessment, allows vital signs to be taken. Also medicated foe headache.  \" I'm not taking nothing until I see my doctor\"

## 2021-12-09 NOTE — PROGRESS NOTES
Notified by spouse patient did not eat lunch or dinner. Pt given turkey sandwiches apple sauce and jello. Pt states he doesn't want to eat now. Pt states he eats only eats healthy food and cannot drink anything with high fructose corn syrup\". Spouse notified.

## 2021-12-09 NOTE — PROGRESS NOTES
Clinical Pharmacy Note  Vancomycin Consult    Sharyle Pines is a 54 y.o. male ordered Vancomycin for sepsis; consult received from Dr. Becka German to manage therapy. Also receiving ceftriaxone. Patient Active Problem List   Diagnosis    Low testosterone in male    Fatigue    Hypothyroidism    Insomnia    Depression    Hypertension    AMS (altered mental status)    Sepsis ([facility] [address]       Allergies:  Bactrim [sulfamethoxazole-trimethoprim]     Temp max:  Temp (24hrs), Av.8 °F (36.6 °C), Min:97.5 °F (36.4 °C), Max:98 °F (36.7 °C)      Recent Labs     21  2151 21  0831 21  1708   WBC 16.4* 7.5 7.0       Recent Labs     21  0927 21  1339 21  0831   BUN 9 8 7   CREATININE 0.8* 0.7* 0.8*         Intake/Output Summary (Last 24 hours) at 2021 2220  Last data filed at 2021 1825  Gross per 24 hour   Intake 600 ml   Output --   Net 600 ml         Ht Readings from Last 1 Encounters:   21 5' 9\" (1.753 m)        Wt Readings from Last 1 Encounters:   21 222 lb 14.2 oz (101.1 kg)         Estimated Creatinine Clearance: 131 mL/min (A) (based on SCr of 0.8 mg/dL (L)). Assessment/Plan:  Trough level = 11.2 mg/L    Will continue vancomycin 1500 mg IV every 12 hours. Exposure target: AUC24 (range)400-600 mg/L.hr   AUC24,ss: 410 mg/L.hr  Probability of AUC24 > 400: 55 %  Ctrough,ss: 11 mg/L  Probability of Ctrough,ss > 20: 6 %  Probability of nephrotoxicity (Lodise DUANE ): 7 %    May need to adjust dose if any change in renal function occurs. Next trough to be ordered as needed. Thank you for the consult.      Jose Parry [facility]  2021 10:22 PM

## 2021-12-09 NOTE — DISCHARGE SUMMARY
Hospital Discharge Summary    Patient's PCP: No primary care provider on file. Admit Date: 12/6/2021   Discharge Date: 12/9/2021    Admitting Physician: Dr. Carmel Ruggiero MD  Discharge Physician: Dr. Elzbieta Billy MD   Consults: ID, nephrology and neurology    Brief HPI:     54 y.o. male with history of hypothyroidism, hypertension presented to emergency room with complaints of shakiness/tremors, fatigue and weakness for 1 day. .. He was recently admitted to the hospital with complaints of altered mental status, hearing loss, headache. .. CT head showed showed left mastoid inflammation and moderate sinus inflammation consistent with chronic inflammatory disease. Lulu Cassidy He was seen by ENT who felt Chronic sinonasal and mastoid inflammatory disease with left eustachian tube dysfunction and effusion. Sterile appearing with no clear evidence of acute infection. . Treated empirically with Augmentin and Medrol pack on discharge with plans to follow-up outpatient in 1 week. .     At home, the patient had tremors, fatigue and weakness was apparently with some confusion and was brought back to the ED. .  Initial vitals, BP 98/67, pulse 134, respirations 18, temperature 97.9, oxygen saturation 94% on room air chest x-ray was clear. ... BMP showed sodium 134, potassium 2.4, bicarb 14 with anion gap of 25, blood glucose 255. Serum lactate was 9.6,, procalcitonin 0.03. ... CT head showed Normal intracranial exam.   Minor bilateral ethmoid sinus mucosal disease markedly improved from the comparison study 3 days earlier. The patient was started on IV fluids, 30 cc/kg given normal saline, empiric broad-spectrum antibiotics IV vancomycin and Rocephin. .. He also started on insulin drip/DKA protocol and transferred to the ICU    Brief hospital course:     -Acute metabolic encephalopathy.  Confusion on presentation. Related to sepsis, acidosis, renal failure. .. CT head with no acute issues--improved--        -Sepsis. Simi Cook criteria with lactic acidosis. .. Source unclear ? Jermaine Lomeli ... Continue broad-spectrum empiric antibiotics-treated with IV vancomycin and ceftriaxone-- -cultures negative. Jermaine Lomeli Discharged on levofloxacin for 5 days. . ID consult appreciated        -Lactic acidosis likely due to sepsis. . Improved with fluids and antibiotics--        -Stress-induced undewfxeibbkg-jfkldtoo-qs DKA--hemoglobin A1c 5.8--- this is due to recent steroids and stress from sepsis--- discontinued insulin drip 12/7 -discontinue sliding scale     -Acute kidney injury--prerenal-resolved with fluids-     -hypokalemia--repleted     -Hypophosphatemia--repleted     -Chronic sinonasal and mastoid inflammatory disease with left eustachian tube dysfunction and effusion--- per recent ENT consult, there is no evidence of acute infection --- Medrol pack plus Augmentin was given empirically for 10 days and plan was to follow-up in the clinic . . CT 12/7/21 shows minor bilateral ethmoid sinus mucosal disease, markedly improved from 3 days prior     -Hypothyroidism-continue Synthroid     -Essential hypertension-uncontrolled-metoprolol and lisinopril resumed     -Irritable mood/anger outburst--- wife requested for psych consult --psych was consulted the patient demanded to leave without seeing them    Invasive procedures:  None    Discharge Diagnoses: Active Problems:    Sepsis ([address]  Resolved Problems:    * No resolved hospital problems. *      Physical Exam: BP (!) 145/88   Pulse 89   Temp 98.1 °F (36.7 °C) (Oral)   Resp 16   Wt 218 lb 4.1 oz (99 kg)   SpO2 95%   BMI 32.23 kg/m²   Gen/overall appearance: Not in acute distress. Alert. Head: Normocephalic, atraumatic  Eyes: EOMI, good acuity  ENT:- Oral mucosa moist  Neck: No JVD, thyromegaly  CVS: Nml S1S2, no MRG, RRR  Pulm: Clear bilaterally. No crackles/wheezes  Gastrointestinal: Soft, NT/ND, +BS  Musculoskeletal: No edema. Warm  Neuro: No focal deficit. Moves extremity spontaneously. Psychiatry: Appropriate affect.  Not agitated. Skin: Warm, dry with normal turgor. No rash        Significant Diagnostic Studies:    See above      Treatments: As above. Discharge Medications:     Medication List      START taking these medications    levoFLOXacin 500 MG tablet  Commonly known as: LEVAQUIN  Take 1 tablet by mouth daily for 5 days     lisinopril 5 MG tablet  Commonly known as: PRINIVIL;ZESTRIL  Take 1 tablet by mouth daily        CONTINUE taking these medications    acetaminophen 500 MG tablet  Commonly known as: TYLENOL     fluticasone 50 MCG/ACT nasal spray  Commonly known as: FLONASE  2 sprays by Each Nostril route daily     levothyroxine 125 MCG tablet  Commonly known as: SYNTHROID     metoprolol 100 MG tablet  Commonly known as: LOPRESSOR  TAKE ONE TABLET BY MOUTH TWICE A DAY        STOP taking these medications    amoxicillin-clavulanate 875-125 MG per tablet  Commonly known as: AUGMENTIN     methylPREDNISolone 4 MG tablet  Commonly known as: MEDROL DOSEPACK           Where to Get Your Medications      These medications were sent to [address] [place] [address] [address] [place] [address], [address] [address]    Phone: [phone]   · levoFLOXacin 500 MG tablet  · lisinopril 5 MG tablet         Activity: activity as tolerated  Diet: ADULT DIET; Regular      Disposition: home  Discharged Condition: Stable  Follow Up:   No follow-up provider specified. Code status:  Full Code         Total time spent on discharge, finalizing medications, referrals and arranging outpatient follow up was more than 45 minutes      Thank you Dr. Sahni primary care provider on file. for the opportunity to be involved in this patients care.

## 2021-12-10 LAB — BLOOD CULTURE, ROUTINE: NORMAL

## 2021-12-11 LAB — CULTURE, BLOOD 2: NORMAL

## 2022-01-03 PROBLEM — A41.9 SEPSIS (HCC): Status: RESOLVED | Noted: 2021-12-07 | Resolved: 2022-01-03

## 2022-01-04 ENCOUNTER — HOSPITAL ENCOUNTER (OUTPATIENT)
Dept: PHYSICAL THERAPY | Age: 52
Setting detail: THERAPIES SERIES
Discharge: HOME | End: 2022-01-04

## 2022-01-04 NOTE — FLOWSHEET NOTE
[facility], [facility]  [address] [facility], [facility]  Phone: [phone]   Fax:     [phone]    Physical Therapy  Cancellation/No-show Note  Patient Name:  Angeila Etienne  :  1970   Date:  2022  Cancelled visits to date: 3  No-shows to date: 4    Patient status for today's appointment patient:  [x]  Cancelled  []  Rescheduled appointment  []  No-show     Reason given by patient:  []  Patient ill  []  Conflicting appointment  [x]  No transportation    []  Conflict with work  []  No reason given  []  Other:     Comments: Has Covid.  Taken off of schedule   Phone call information:   []  Phone call made today to patient at at number provided:      []  Patient answered, conversation as follows:     []  Patient did not answer, message left as follows:   []  Phone call not made today    Electronically signed by:  Zigmund Councilman  [id number]

## 2022-01-11 ENCOUNTER — HOSPITAL ENCOUNTER (OUTPATIENT)
Dept: PHYSICAL THERAPY | Age: 52
Setting detail: THERAPIES SERIES
Discharge: HOME | End: 2022-01-11

## 2022-01-11 NOTE — FLOWSHEET NOTE
[facility], [facility]  [address] [facility], [facility]  Phone: [phone]   Fax:     [phone]    Physical Therapy  Cancellation/No-show Note  Patient Name:  Nena Myers  :  1970   Date:  2022  Cancelled visits to date: 4  No-shows to date: 4    Patient status for today's appointment patient:  [x]  Cancelled  []  Rescheduled appointment  []  No-show     Reason given by patient:  []  Patient ill  []  Conflicting appointment  []  No transportation    []  Conflict with work  []  No reason given  []  Other:     Comments: No further appts scheduled  Phone call information:   []  Phone call made today to patient at at number provided:      []  Patient answered, conversation as follows:     []  Patient did not answer, message left as follows:   []  Phone call not made today    Electronically signed by:  Luz LANIER#[id number]

## 2022-02-03 NOTE — PROGRESS NOTES
Aguilar Zamora   1970, 50 y.o. male   <[id number]>       Referring Provider: Amna Milton MD  Referral Type: In an order in [address] [place] [address]    Reason for Visit: Re-evaluation of disorder as deemed medically necessary by referring provider    ADULT AUDIOLOGIC EVALUATION      Aguilar Zamora is a 50 y.o. male seen today, 2/10/2022 , for an initial audiologic evaluation. Patient was seen by Amna Milton MD following today's evaluation. AUDIOLOGIC AND OTHER PERTINENT MEDICAL HISTORY:      Aguilar Zamora noted a gradual decrease in hearing bilaterally since last audiogram on 10/14/19. Patient history and results at audiologic evaluation 10/14/19:  Aguilar Zamora noted bilateral hearing loss and otalgia. AD: Mild SNHL, Excellent WRS, Type A tymp  AS: Moderate rising to Mild sloping to Moderately-Severe SNHL, Good WRS, Type B tymp      Date: 2/10/2022     IMPRESSIONS:        AD: Mild rising to Hearing WNL sloping to Mild SNHL, Excellent WRS, Type A tymp  AS: Moderate rising to Hearing WNL sloping to ModerateSNHL, Excellent WRS, Type C tymp    Reliability: FAIR    Test results consistent with bilateral Sensorineural hearing loss. Hearing loss significant enough to create hearing difficulty in most listening situations. Discussed hearing loss and hearing aids with patient. Patient has a medicaid insurance plan. We are not in network with medicaid for hearing aid services. I provided the patient with contact information to the [facility] and [facility] to pursue amplification. Patient to follow medical recommendations per Amna Milton MD .    ASSESSMENT AND FINDINGS:     Otoscopy revealed: Clear ear canals bilaterally    RIGHT EAR:  Hearing Sensitivity: Mild to normal hearing to Mild Sensorineural hearing loss  Speech Recognition Threshold: 30 dB HL  Word Recognition:Excellent (100%), based on NU-6 25-word list at 65 dBHL using recorded speech stimuli.     Tympanometry: Normal peak pressure and compliance, Type A tympanogram, consistent with normal middle ear function. Acoustic Reflexes: Ipsilateral: Could not maintain seal. Contralateral: Could not maintain seal.    LEFT EAR:  Hearing Sensitivity: Moderate to normal hearing to Moderate Sensorineural hearing loss  Speech Recognition Threshold: 35 dB HL  Word Recognition:Excellent (96%), based on NU-6 25-word list at 65 dBHL using recorded speech stimuli. Tympanometry: Negative peak pressure with normal compliance, Type C tympanogram, consistent with ETD/history of otitis media. Acoustic Reflexes: Ipsilateral: Could not maintain seal. Contralateral: Could not maintain seal.    Reliability: Fair  Transducer: Inserts    See scanned audiogram dated 2/10/2022  for results. PATIENT EDUCATION:     The following items were discussed with the patient:   - Good Communication Strategies  - Hearing Loss and Hearing Aids    Educational information was shared in the After Visit Summary. RECOMMENDATIONS:                                                                                                                                                                                                                                                          The following items are recommended based on patient report and results from today's appointment:   - Continue medical follow-up with Ambar Moreno MD.   - Retest hearing as medically indicated and/or sooner if a change in hearing is noted. - If desired, schedule a Hearing Aid Evaluation (HAE) appointment to discuss hearing aid options. - Utilize \"Good Communication Strategies\" as discussed to assist in speech understanding with communication partners.        Cary Lemos  Audiologist    Chart CC'd to: Ambar Moreno MD      Degree of   Hearing Sensitivity dB Range   Within Normal Limits (WNL) 0 - 20   Mild 20 - 40   Moderate 40 - 55   Moderately-Severe 55 - 70   Severe 70 - 90   Profound 90 +

## 2022-02-10 ENCOUNTER — OFFICE VISIT (OUTPATIENT)
Dept: ENT CLINIC | Age: 52
End: 2022-02-10
Payer: COMMERCIAL

## 2022-02-10 ENCOUNTER — PROCEDURE VISIT (OUTPATIENT)
Dept: AUDIOLOGY | Age: 52
End: 2022-02-10
Payer: COMMERCIAL

## 2022-02-10 VITALS — DIASTOLIC BLOOD PRESSURE: 76 MMHG | BODY MASS INDEX: 33.37 KG/M2 | SYSTOLIC BLOOD PRESSURE: 129 MMHG | WEIGHT: 226 LBS

## 2022-02-10 DIAGNOSIS — H90.3 SENSORINEURAL HEARING LOSS (SNHL) OF BOTH EARS: Primary | ICD-10-CM

## 2022-02-10 DIAGNOSIS — H91.90 HEARING LOSS, UNSPECIFIED HEARING LOSS TYPE, UNSPECIFIED LATERALITY: Primary | ICD-10-CM

## 2022-02-10 DIAGNOSIS — H69.82 DYSFUNCTION OF LEFT EUSTACHIAN TUBE: ICD-10-CM

## 2022-02-10 PROCEDURE — 1036F TOBACCO NON-USER: CPT | Performed by: OTOLARYNGOLOGY

## 2022-02-10 PROCEDURE — 99214 OFFICE O/P EST MOD 30 MIN: CPT | Performed by: OTOLARYNGOLOGY

## 2022-02-10 PROCEDURE — 92557 COMPREHENSIVE HEARING TEST: CPT | Performed by: AUDIOLOGIST

## 2022-02-10 PROCEDURE — G8484 FLU IMMUNIZE NO ADMIN: HCPCS | Performed by: OTOLARYNGOLOGY

## 2022-02-10 PROCEDURE — 3017F COLORECTAL CA SCREEN DOC REV: CPT | Performed by: OTOLARYNGOLOGY

## 2022-02-10 PROCEDURE — 92567 TYMPANOMETRY: CPT | Performed by: AUDIOLOGIST

## 2022-02-10 PROCEDURE — G8417 CALC BMI ABV UP PARAM F/U: HCPCS | Performed by: OTOLARYNGOLOGY

## 2022-02-10 PROCEDURE — G8427 DOCREV CUR MEDS BY ELIG CLIN: HCPCS | Performed by: OTOLARYNGOLOGY

## 2022-02-10 NOTE — Clinical Note
Dr. Nelson Negron,    Please see note from this patient's audiogram from today. Please let me know if there is anything further you need.         Cary Serna  Audiologist

## 2022-02-10 NOTE — PROGRESS NOTES
[facility]- HEAD & NECK SURGERY  Follow up      Patient Name: Caro Florentino  Medical Record Number:  <[id number]>  Primary Care Physician:  No primary care provider on file. Date of Consultation: 2/10/2022    Chief Complaint: Hearing loss        Interval History  Patient presents for evaluation of hearing loss. Says it has been occurring for a while. Really does not have any significant symptoms. Does not really elaborate much when asked questions. Denies ear pain. Feels as though is probably getting worse. Did have a hearing test a couple of years ago, but did not see an ear nose and throat doctor at that time. Says he did not have problems as a kid as far as he knows. Reportedly did have hearing aids, but lost one of them. My partner Dr. Edward Celeste saw him as an inpatient in December 2021. He had some fluid in the mastoid at that time without any clinical evidence of mastoiditis. REVIEW OF SYSTEMS  As above    PHYSICAL EXAM  GENERAL: No Acute Distress, Alert and Oriented, no Hoarseness, strong voice  EYES: EOMI, Anti-icteric  HENT:   Head: Normocephalic and atraumatic. Face:  Symmetric, facial nerve intact, no sinus tenderness   ears: See below  Mouth/Oral Cavity:  normal lips, Uvula is midline, no mucosal lesions,  Oropharynx/Larynx:  normal oropharynx,  Nose:Normal external nasal appearance. NECK: Normal range of motion, no thyromegaly, trachea is midline, no lymphadenopathy, no neck masses, no crepitus        Radiology  I reviewed the CT scan of the head from December 6, 2021. He has some fluid in the mastoid a little bit in the middle ear space without any coalescence or evidence of erosion. PROCEDURE  Bilateral ear exam  The right ear is visualized microscope. Tympanic membrane intact with an aerated middle ear    On the left side he had a fairly thick tympanic membrane with some retraction. The middle ear did appear to be aerated.     The patient had an audiogram.  On the right side he had mild sloping to normal and then back to mild sensorineural hearing loss. On the left side he had moderate sloping to normal and then back down to moderate sensorineural hearing loss. Type C tympanogram on the left      ASSESSMENT/PLAN  1. Sensorineural hearing loss (SNHL) of both ears  The patient really does not have a lot of complaints other than the hearing loss. No significant ear history. I suspect that this is related to some sort of noise exposure. It is a little bit asymmetric, but I feel as though the significant negative pressure may be causing a little bit undiagnosed conductive hearing loss on the left side. I think he would benefit from hearing aids. He is going to look into getting another set. 2. Dysfunction of left eustachian tube  He does have a lot of negative pressure on the left side with exam findings consistent with this. However he really denies any symptoms whatsoever. I do not think that an ear tube would make a huge difference in his hearing. At this time I would not recommend an ear tube or other treatment unless he develops symptoms or recurrent ear infections             I have performed a head and neck physical exam personally or was physically present during the key or critical portions of the service. This note was generated completely or in part utilizing Dragon dictation speech recognition software. Occasionally, words are mistranscribed and despite editing, the text may contain inaccuracies due to incorrect word recognition. If further clarification is needed please contact the office at [phone].

## 2022-02-10 NOTE — PATIENT INSTRUCTIONS
It's also a good idea to know your test results and keep a list of the medicines you take. How can you care for yourself at home? · Avoid loud noises whenever possible. This helps keep your hearing from getting worse. Always wear hearing protection around loud noises. · If appropriate, wear hearing aid(s) as directed. It is recommended that hearing aids are worn during all waking hours to keep your brain active and give it access to the sounds it is missing. · If you are beginning your process with hearing aid(s), schedule a \"Hearing Aid Evaluation\" with an audiologist to discuss your lifestyle, features of hearing aid technology, and styles of hearing aids available. It is recommended that you contact your insurance company to determine if you have a hearing aid benefit, as this may dictate who you can see for these services. · Have hearing tests as your doctor suggests. They can show whether your hearing has changed. Your hearing aid may need to be adjusted. · Use other assistive devices as needed. These may include:  ? Telephone amplifiers and hearing aids that can connect to a television, stereo, radio, or microphone. ? Devices that use lights or vibrations. These alert you to the doorbell, a ringing telephone, or a baby monitor. ? Television closed-captioning. This shows the words at the bottom of the screen. Most new TVs can do this. ? TTY (text telephone). This lets you type messages back and forth on the telephone instead of talking or listening. These devices are also called TDD. When messages are typed on the keyboard, they are sent over the phone line to a receiving TTY. The message is shown on a monitor. · Use pagers, fax machines, text, and email if it is hard for you to communicate by telephone. · Try to learn a listening technique called speech-reading. It is not lip-reading. You pay attention to people's gestures, expressions, posture, and tone of voice.  These clues can help you understand what a person is saying. Face the person you are talking to, and have him or her face you. Make sure the lighting is good. You need to see the other person's face clearly. · Think about counseling if you need help to adjust to your hearing loss. When should you call for help? Watch closely for changes in your health, and be sure to contact your doctor if:    · You think your hearing is getting worse. · You have new symptoms, such as dizziness or nausea.

## 2023-01-02 ENCOUNTER — APPOINTMENT (OUTPATIENT)
Dept: CT IMAGING | Age: 53
End: 2023-01-02
Payer: COMMERCIAL

## 2023-01-02 ENCOUNTER — HOSPITAL ENCOUNTER (INPATIENT)
Age: 53
LOS: 6 days | Discharge: HOME | DRG: 347 | End: 2023-01-08
Attending: INTERNAL MEDICINE | Admitting: INTERNAL MEDICINE
Payer: COMMERCIAL

## 2023-01-02 ENCOUNTER — HOSPITAL ENCOUNTER (EMERGENCY)
Age: 53
Discharge: ANOTHER HOSPITAL | End: 2023-01-02
Attending: EMERGENCY MEDICINE
Payer: COMMERCIAL

## 2023-01-02 VITALS
HEART RATE: 69 BPM | RESPIRATION RATE: 20 BRPM | HEIGHT: 70 IN | SYSTOLIC BLOOD PRESSURE: 154 MMHG | BODY MASS INDEX: 32.35 KG/M2 | WEIGHT: 225.97 LBS | OXYGEN SATURATION: 99 % | DIASTOLIC BLOOD PRESSURE: 83 MMHG | TEMPERATURE: 97.6 F

## 2023-01-02 DIAGNOSIS — M54.12 CERVICAL RADICULOPATHY: ICD-10-CM

## 2023-01-02 DIAGNOSIS — M48.02 CERVICAL STENOSIS OF SPINE: ICD-10-CM

## 2023-01-02 DIAGNOSIS — R53.83 FATIGUE, UNSPECIFIED TYPE: Primary | ICD-10-CM

## 2023-01-02 DIAGNOSIS — M54.2 NECK PAIN: Primary | ICD-10-CM

## 2023-01-02 LAB
A/G RATIO: 1.7 (ref 1.1–2.2)
ALBUMIN SERPL-MCNC: 4 G/DL (ref 3.4–5)
ALP BLD-CCNC: 94 U/L (ref 40–129)
ALT SERPL-CCNC: 31 U/L (ref 10–40)
ANION GAP SERPL CALCULATED.3IONS-SCNC: 13 MMOL/L (ref 3–16)
AST SERPL-CCNC: 23 U/L (ref 15–37)
BASOPHILS ABSOLUTE: 0.1 K/UL (ref 0–0.2)
BASOPHILS RELATIVE PERCENT: 1.1 %
BILIRUB SERPL-MCNC: 0.4 MG/DL (ref 0–1)
BUN BLDV-MCNC: 12 MG/DL (ref 7–20)
CALCIUM SERPL-MCNC: 9.3 MG/DL (ref 8.3–10.6)
CHLORIDE BLD-SCNC: 107 MMOL/L (ref 99–110)
CO2: 22 MMOL/L (ref 21–32)
CREAT SERPL-MCNC: 0.9 MG/DL (ref 0.9–1.3)
EOSINOPHILS ABSOLUTE: 0.3 K/UL (ref 0–0.6)
EOSINOPHILS RELATIVE PERCENT: 3.1 %
GFR SERPL CREATININE-BSD FRML MDRD: >60 ML/MIN/{1.73_M2}
GLUCOSE BLD-MCNC: 95 MG/DL (ref 70–99)
HCT VFR BLD CALC: 50.4 % (ref 40.5–52.5)
HEMOGLOBIN: 16.9 G/DL (ref 13.5–17.5)
LYMPHOCYTES ABSOLUTE: 2.8 K/UL (ref 1–5.1)
LYMPHOCYTES RELATIVE PERCENT: 31.5 %
MCH RBC QN AUTO: 28.9 PG (ref 26–34)
MCHC RBC AUTO-ENTMCNC: 33.6 G/DL (ref 31–36)
MCV RBC AUTO: 86.1 FL (ref 80–100)
MONOCYTES ABSOLUTE: 0.8 K/UL (ref 0–1.3)
MONOCYTES RELATIVE PERCENT: 9.4 %
NEUTROPHILS ABSOLUTE: 4.9 K/UL (ref 1.7–7.7)
NEUTROPHILS RELATIVE PERCENT: 54.9 %
PDW BLD-RTO: 13.8 % (ref 12.4–15.4)
PLATELET # BLD: 194 K/UL (ref 135–450)
PMV BLD AUTO: 9.1 FL (ref 5–10.5)
POTASSIUM REFLEX MAGNESIUM: 3.7 MMOL/L (ref 3.5–5.1)
RBC # BLD: 5.85 M/UL (ref 4.2–5.9)
SODIUM BLD-SCNC: 142 MMOL/L (ref 136–145)
TOTAL PROTEIN: 6.4 G/DL (ref 6.4–8.2)
TROPONIN: <0.01 NG/ML
WBC # BLD: 8.9 K/UL (ref 4–11)

## 2023-01-02 PROCEDURE — 6370000000 HC RX 637 (ALT 250 FOR IP): Performed by: INTERNAL MEDICINE

## 2023-01-02 PROCEDURE — 6360000002 HC RX W HCPCS: Performed by: EMERGENCY MEDICINE

## 2023-01-02 PROCEDURE — 96376 TX/PRO/DX INJ SAME DRUG ADON: CPT

## 2023-01-02 PROCEDURE — 93005 ELECTROCARDIOGRAM TRACING: CPT | Performed by: EMERGENCY MEDICINE

## 2023-01-02 PROCEDURE — 6360000004 HC RX CONTRAST MEDICATION: Performed by: EMERGENCY MEDICINE

## 2023-01-02 PROCEDURE — 2500000003 HC RX 250 WO HCPCS: Performed by: EMERGENCY MEDICINE

## 2023-01-02 PROCEDURE — A4216 STERILE WATER/SALINE, 10 ML: HCPCS | Performed by: EMERGENCY MEDICINE

## 2023-01-02 PROCEDURE — 99285 EMERGENCY DEPT VISIT HI MDM: CPT

## 2023-01-02 PROCEDURE — 2580000003 HC RX 258: Performed by: INTERNAL MEDICINE

## 2023-01-02 PROCEDURE — 74174 CTA ABD&PLVS W/CONTRAST: CPT

## 2023-01-02 PROCEDURE — 6360000002 HC RX W HCPCS: Performed by: INTERNAL MEDICINE

## 2023-01-02 PROCEDURE — 72125 CT NECK SPINE W/O DYE: CPT

## 2023-01-02 PROCEDURE — 80053 COMPREHEN METABOLIC PANEL: CPT

## 2023-01-02 PROCEDURE — 96374 THER/PROPH/DIAG INJ IV PUSH: CPT

## 2023-01-02 PROCEDURE — 6370000000 HC RX 637 (ALT 250 FOR IP): Performed by: EMERGENCY MEDICINE

## 2023-01-02 PROCEDURE — 96375 TX/PRO/DX INJ NEW DRUG ADDON: CPT

## 2023-01-02 PROCEDURE — 85025 COMPLETE CBC W/AUTO DIFF WBC: CPT

## 2023-01-02 PROCEDURE — 1200000000 HC SEMI PRIVATE

## 2023-01-02 PROCEDURE — 84484 ASSAY OF TROPONIN QUANT: CPT

## 2023-01-02 PROCEDURE — 2580000003 HC RX 258: Performed by: EMERGENCY MEDICINE

## 2023-01-02 RX ORDER — DIPHENHYDRAMINE HYDROCHLORIDE 50 MG/ML
25 INJECTION INTRAMUSCULAR; INTRAVENOUS ONCE
Status: COMPLETED | OUTPATIENT
Start: 2023-01-02 | End: 2023-01-02

## 2023-01-02 RX ORDER — ENOXAPARIN SODIUM 100 MG/ML
40 INJECTION SUBCUTANEOUS DAILY
Status: DISCONTINUED | OUTPATIENT
Start: 2023-01-03 | End: 2023-01-08 | Stop reason: HOSPADM

## 2023-01-02 RX ORDER — OXYCODONE HYDROCHLORIDE 5 MG/1
5 TABLET ORAL EVERY 4 HOURS PRN
Status: DISCONTINUED | OUTPATIENT
Start: 2023-01-02 | End: 2023-01-04

## 2023-01-02 RX ORDER — ACETAMINOPHEN 650 MG/1
650 SUPPOSITORY RECTAL EVERY 6 HOURS PRN
Status: DISCONTINUED | OUTPATIENT
Start: 2023-01-02 | End: 2023-01-08 | Stop reason: HOSPADM

## 2023-01-02 RX ORDER — ONDANSETRON 2 MG/ML
4 INJECTION INTRAMUSCULAR; INTRAVENOUS EVERY 6 HOURS PRN
Status: DISCONTINUED | OUTPATIENT
Start: 2023-01-02 | End: 2023-01-08 | Stop reason: HOSPADM

## 2023-01-02 RX ORDER — MORPHINE SULFATE 2 MG/ML
4 INJECTION, SOLUTION INTRAMUSCULAR; INTRAVENOUS EVERY 4 HOURS PRN
Status: DISCONTINUED | OUTPATIENT
Start: 2023-01-02 | End: 2023-01-08 | Stop reason: HOSPADM

## 2023-01-02 RX ORDER — MORPHINE SULFATE 4 MG/ML
4 INJECTION, SOLUTION INTRAMUSCULAR; INTRAVENOUS ONCE
Status: COMPLETED | OUTPATIENT
Start: 2023-01-02 | End: 2023-01-02

## 2023-01-02 RX ORDER — QUETIAPINE FUMARATE 50 MG/1
50 TABLET, EXTENDED RELEASE ORAL NIGHTLY
COMMUNITY

## 2023-01-02 RX ORDER — CLONAZEPAM 1 MG/1
1 TABLET ORAL 2 TIMES DAILY PRN
COMMUNITY

## 2023-01-02 RX ORDER — SODIUM CHLORIDE 0.9 % (FLUSH) 0.9 %
5-40 SYRINGE (ML) INJECTION EVERY 12 HOURS SCHEDULED
Status: DISCONTINUED | OUTPATIENT
Start: 2023-01-02 | End: 2023-01-08 | Stop reason: HOSPADM

## 2023-01-02 RX ORDER — SODIUM CHLORIDE 9 MG/ML
INJECTION, SOLUTION INTRAVENOUS PRN
Status: DISCONTINUED | OUTPATIENT
Start: 2023-01-02 | End: 2023-01-08 | Stop reason: HOSPADM

## 2023-01-02 RX ORDER — DEXAMETHASONE SODIUM PHOSPHATE 4 MG/ML
10 INJECTION, SOLUTION INTRA-ARTICULAR; INTRALESIONAL; INTRAMUSCULAR; INTRAVENOUS; SOFT TISSUE ONCE
Status: COMPLETED | OUTPATIENT
Start: 2023-01-02 | End: 2023-01-02

## 2023-01-02 RX ORDER — METHOCARBAMOL 750 MG/1
1500 TABLET, FILM COATED ORAL ONCE
Status: COMPLETED | OUTPATIENT
Start: 2023-01-02 | End: 2023-01-02

## 2023-01-02 RX ORDER — ONDANSETRON 2 MG/ML
4 INJECTION INTRAMUSCULAR; INTRAVENOUS ONCE
Status: COMPLETED | OUTPATIENT
Start: 2023-01-02 | End: 2023-01-02

## 2023-01-02 RX ORDER — POLYETHYLENE GLYCOL 3350 17 G/17G
17 POWDER, FOR SOLUTION ORAL DAILY PRN
Status: DISCONTINUED | OUTPATIENT
Start: 2023-01-02 | End: 2023-01-08 | Stop reason: HOSPADM

## 2023-01-02 RX ORDER — LEVOTHYROXINE SODIUM 0.12 MG/1
125 TABLET ORAL DAILY
COMMUNITY

## 2023-01-02 RX ORDER — ACETAMINOPHEN 325 MG/1
650 TABLET ORAL EVERY 6 HOURS PRN
Status: DISCONTINUED | OUTPATIENT
Start: 2023-01-02 | End: 2023-01-08 | Stop reason: HOSPADM

## 2023-01-02 RX ORDER — SODIUM CHLORIDE 0.9 % (FLUSH) 0.9 %
5-40 SYRINGE (ML) INJECTION PRN
Status: DISCONTINUED | OUTPATIENT
Start: 2023-01-02 | End: 2023-01-08 | Stop reason: HOSPADM

## 2023-01-02 RX ORDER — ONDANSETRON 4 MG/1
4 TABLET, ORALLY DISINTEGRATING ORAL EVERY 8 HOURS PRN
Status: DISCONTINUED | OUTPATIENT
Start: 2023-01-02 | End: 2023-01-08 | Stop reason: HOSPADM

## 2023-01-02 RX ORDER — GABAPENTIN 100 MG/1
100 CAPSULE ORAL 3 TIMES DAILY
Status: DISCONTINUED | OUTPATIENT
Start: 2023-01-02 | End: 2023-01-08 | Stop reason: HOSPADM

## 2023-01-02 RX ADMIN — MORPHINE SULFATE 4 MG: 4 INJECTION, SOLUTION INTRAMUSCULAR; INTRAVENOUS at 18:22

## 2023-01-02 RX ADMIN — DEXAMETHASONE SODIUM PHOSPHATE 10 MG: 4 INJECTION, SOLUTION INTRAMUSCULAR; INTRAVENOUS at 15:57

## 2023-01-02 RX ADMIN — IOPAMIDOL 75 ML: 755 INJECTION, SOLUTION INTRAVENOUS at 17:07

## 2023-01-02 RX ADMIN — METHOCARBAMOL 1000 MG: 100 INJECTION, SOLUTION INTRAMUSCULAR; INTRAVENOUS at 23:46

## 2023-01-02 RX ADMIN — METHOCARBAMOL TABLETS 1500 MG: 750 TABLET, COATED ORAL at 15:55

## 2023-01-02 RX ADMIN — FAMOTIDINE 20 MG: 10 INJECTION, SOLUTION INTRAVENOUS at 16:33

## 2023-01-02 RX ADMIN — SODIUM CHLORIDE, PRESERVATIVE FREE 10 ML: 5 INJECTION INTRAVENOUS at 23:36

## 2023-01-02 RX ADMIN — HYDROMORPHONE HYDROCHLORIDE 1 MG: 1 INJECTION, SOLUTION INTRAMUSCULAR; INTRAVENOUS; SUBCUTANEOUS at 15:56

## 2023-01-02 RX ADMIN — DIPHENHYDRAMINE HYDROCHLORIDE 25 MG: 50 INJECTION, SOLUTION INTRAMUSCULAR; INTRAVENOUS at 16:33

## 2023-01-02 RX ADMIN — GABAPENTIN 100 MG: 100 CAPSULE ORAL at 23:35

## 2023-01-02 RX ADMIN — ONDANSETRON 4 MG: 2 INJECTION INTRAMUSCULAR; INTRAVENOUS at 15:56

## 2023-01-02 RX ADMIN — OXYCODONE 5 MG: 5 TABLET ORAL at 23:35

## 2023-01-02 RX ADMIN — MORPHINE SULFATE 4 MG: 4 INJECTION, SOLUTION INTRAMUSCULAR; INTRAVENOUS at 21:15

## 2023-01-02 ASSESSMENT — PAIN DESCRIPTION - ONSET
ONSET: ON-GOING

## 2023-01-02 ASSESSMENT — PAIN DESCRIPTION - ORIENTATION
ORIENTATION: RIGHT

## 2023-01-02 ASSESSMENT — PAIN DESCRIPTION - DESCRIPTORS
DESCRIPTORS: SHARP;SHOOTING
DESCRIPTORS: ACHING;STABBING
DESCRIPTORS: SHOOTING;STABBING
DESCRIPTORS: SHARP;SHOOTING
DESCRIPTORS: SHARP;SHOOTING
DESCRIPTORS: NUMBNESS;SHARP;SHOOTING
DESCRIPTORS: SHARP;SHOOTING;NUMBNESS

## 2023-01-02 ASSESSMENT — PAIN SCALES - GENERAL
PAINLEVEL_OUTOF10: 6
PAINLEVEL_OUTOF10: 10
PAINLEVEL_OUTOF10: 5
PAINLEVEL_OUTOF10: 7
PAINLEVEL_OUTOF10: 5
PAINLEVEL_OUTOF10: 10
PAINLEVEL_OUTOF10: 7

## 2023-01-02 ASSESSMENT — PAIN DESCRIPTION - LOCATION
LOCATION: NECK;SHOULDER
LOCATION: SHOULDER
LOCATION: SHOULDER;NECK
LOCATION: NECK;SHOULDER
LOCATION: SHOULDER

## 2023-01-02 ASSESSMENT — PAIN DESCRIPTION - PAIN TYPE
TYPE: ACUTE PAIN

## 2023-01-02 ASSESSMENT — PAIN DESCRIPTION - FREQUENCY
FREQUENCY: CONTINUOUS

## 2023-01-02 ASSESSMENT — PAIN - FUNCTIONAL ASSESSMENT
PAIN_FUNCTIONAL_ASSESSMENT: PREVENTS OR INTERFERES SOME ACTIVE ACTIVITIES AND ADLS
PAIN_FUNCTIONAL_ASSESSMENT: 0-10
PAIN_FUNCTIONAL_ASSESSMENT: PREVENTS OR INTERFERES SOME ACTIVE ACTIVITIES AND ADLS
PAIN_FUNCTIONAL_ASSESSMENT: PREVENTS OR INTERFERES SOME ACTIVE ACTIVITIES AND ADLS
PAIN_FUNCTIONAL_ASSESSMENT: 0-10
PAIN_FUNCTIONAL_ASSESSMENT: PREVENTS OR INTERFERES SOME ACTIVE ACTIVITIES AND ADLS

## 2023-01-02 NOTE — ED PROVIDER NOTES
[address] [facility]      Pt Name: Sanya Aviles  MRN: [id number]  [place] 1970  Date of evaluation: 1/2/2023  [facility] Staples[facility]       Chief Complaint   Patient presents with    Shoulder Pain     Right shoulder and neck pain since yesterday. History of shoulder issues. States it is nerve pain. HISTORY OF PRESENT ILLNESS   (Location/Symptom, Timing/Onset, Context/Setting, Quality, Duration, Modifying Factors, Severity)  Note limiting factors. Sanya Aviles is a 50 y.o. male who presents to the emergency department with a complaint of severe right shoulder pain. The patient states that he woke up with the pain on Thursday morning 4 days ago and it has been gradually worsening. He describes the pain as dull constant throbbing, aching with occasional sharp shooting pain that extends from the base of his right neck into the right trapezius area and down the dorsal radial aspect of his forearm into his second and third finger. He states that his right arm feels tingly at times and also feels cold. He denies any previous occurrence. He denies any history of cervical disc disease. He denies any recent fall trauma or injury. He denies any preceding unusual activity. He currently rates his pain a 10/10 intensity. He states that he is unable to find a position of comfort. He states that raising his arm above his head seems to help sometimes. He denies any upper or low back pain. He denies any radicular pain into the legs. He does have a history of prior shoulder surgeries on the right in the past.  He denies any recent shoulder injury. He does report a history of hypertension but denies any history of hyperlipidemia, diabetes, coronary artery disease or thromboembolic disease. He quit smoking in the past.  He denies any fever or chills. No cough or cold symptoms.   He denies any chest pain heaviness pressure tightness shortness of breath or diaphoresis. He denies any abdominal pain nausea vomiting or diarrhea. No dysuria or hematuria. He denies any history of drug use. He does not take any anticoagulants. He last urinated prior to arrival.  He denies any incontinence bowel or bladder difficulties. He denies any saddle anesthesia. He denies any back pain or flank pain. Nursing Notes were reviewed. HPI        REVIEW OF SYSTEMS    (2-9 systems for level 4, 10 or more for level 5)       Constitutional: Negative for fever or chills. HENT: Negative for rhinorrhea and sore throat. Eyes: Negative for redness or drainage. Respiratory: Negative for shortness of breath or dyspnea on exertion. Cardiovascular: Negative for chest pain. Gastrointestinal: Negative for abdominal pain. Negative for vomiting or diarrhea. Genitourinary: Negative for flank pain. Negative for dysuria. Negative for hematuria. Neurological: Negative for headache. Musculoskeletal:  Negative edema. Hematological: Negative for adenopathy. All systems are reviewed and are negative except for those listed above in the history of present illness and ROS.         PAST MEDICAL HISTORY     Past Medical History:   Diagnosis Date    ADHD (attention deficit hyperactivity disorder)     Anxiety     Chronic back pain     Depression     Erectile dysfunction     Headache     Hypertension     Hypothyroidism     Low testosterone     Low testosterone level in male     Neuropathy     PTSD (post-traumatic stress disorder)     Thyroid disease          SURGICAL HISTORY       Past Surgical History:   Procedure Laterality Date    BICEPS TENDON REPAIR Left     SHOULDER SURGERY Right          CURRENT MEDICATIONS       Previous Medications    ACETAMINOPHEN (TYLENOL) 500 MG TABLET    Take 500 mg by mouth every 6 hours as needed    FLUTICASONE (FLONASE) 50 MCG/ACT NASAL SPRAY    2 sprays by Each Nostril route daily    LISINOPRIL (PRINIVIL;ZESTRIL) 5 MG TABLET    Take 1 tablet by mouth daily       ALLERGIES     Bactrim [sulfamethoxazole-trimethoprim]    FAMILY HISTORY       Family History   Problem Relation Age of Onset    Diabetes Maternal Grandmother           SOCIAL HISTORY       Social History     Socioeconomic History    Marital status:      Spouse name: None    Number of children: None    Years of education: None    Highest education level: None   Tobacco Use    Smoking status: Former     Types: Cigarettes     Quit date: 12/3/2012     Years since quitting: 10.0    Smokeless tobacco: Never   Vaping Use    Vaping Use: Never used   Substance and Sexual Activity    Alcohol use: Yes     Comment: occoasional use/SOCIALLY    Drug use: Not Currently    Sexual activity: Yes     Partners: Female       SCREENINGS    Frank Coma Scale  Eye Opening: Spontaneous  Best Verbal Response: Oriented  Best Motor Response: Obeys commands  Lincoln Coma Scale Score: 15        PHYSICAL EXAM    (up to 7 for level 4, 8 or more for level 5)     ED Triage Vitals [01/02/23 1432]   BP Temp Temp Source Heart Rate Resp SpO2 Height Weight   (!) 152/104 98.2 °F (36.8 °C) Oral 67 24 100 % 5' 10\" (1.778 m) 225 lb 15.5 oz (102.5 kg)         Physical Exam   Constitutional: Awake and alert. Moderate to severe discomfort. Pacing around the room holding his right arm above his head. Head: No visible evidence of trauma. Normocephalic. Eyes: Pupils equal and reactive. No photophobia. Conjunctiva normal.    HENT: Oral mucosa moist.  Airway patent. Pharynx without erythema. Nares were clear. Neck:  Soft and supple. Tenderness noted along the base of the right neck extending into the right trapezius area. Pain was worsened with axial compression. Heart:  Regular rate and rhythm. No murmur. Lungs:  Clear to auscultation. No wheezes, rales, or ronchi. No conversational dyspnea or accessory muscle use. Chest: Chest wall non-tender. No evidence of trauma.   Abdomen:  Soft, nondistended, bowel sounds present. Nontender. No guarding rigidity or rebound. No masses. Musculoskeletal: Discomfort noted with range of motion in the shoulder but no bony point tenderness. No joint effusion. No visible rash. Strong radial pulse was noted in the right arm and equal bilaterally. Capillary for less than 2 seconds in all digits. Radial median and ulnar nerve are fully intact. Right upper extremity was normal in appearance with normal color and temperature. Pink and warm. Thoracic and lumbar spine were nontender. No point tenderness or step-off. Otherwise, all extremities non-tender with full range of motion. Radial and dorsalis pedis pulses were intact. No calf tenderness erythema or edema. Neurological: Alert and oriented x 3. Speech clear. No dysarthria. No aphasia. No pronator drift.  strength equal bilaterally. Cranial nerves II-XII intact. No facial droop. No acute focal motor or sensory deficits. Negative finger-nose. Negative heel-to-shin. Skin: Skin is warm and dry. No rash. Lymphatic:  No lympadenopathy. Psychiatric: Normal mood and affect. Behavior is normal.         DIAGNOSTIC RESULTS     EKG: All EKG's are interpreted by the Emergency Department Physician who either signs or Co-signs this chart in the absence of a cardiologist.    Normal sinus rhythm. Rate 61. TX interval 132 ms. QRS duration 110 ms. QTc 422 ms. R axis 52 degrees. There is no ST elevation. Left ventricular hypertrophy. RADIOLOGY:   Non-plain film images such as CT, Ultrasound and MRI are read by the radiologist. Plain radiographic images are visualized and preliminarily interpreted by the emergency physician with the below findings:        Interpretation per the Radiologist below, if available at the time of this note:    CT CERVICAL SPINE WO CONTRAST   Final Result      1.   Posterior osteophyte and possibly a mild degree of broad posterior disc   extension C6-T1 resulting in a degree of central canal stenosis. 2.  No evidence of fracture with degenerative changes as described. CTA CHEST ABDOMEN PELVIS W CONTRAST   Final Result      1. No evidence of aortic aneurysm or dissection. 2.  No acute pathology noted within the chest, abdomen or pelvis. ED BEDSIDE ULTRASOUND:   Performed by ED Physician - none    LABS:  Labs Reviewed   CBC WITH AUTO DIFFERENTIAL   COMPREHENSIVE METABOLIC PANEL W/ REFLEX TO MG FOR LOW K   TROPONIN       All other labs were within normal range or not returned as of this dictation. EMERGENCY DEPARTMENT COURSE and DIFFERENTIAL DIAGNOSIS/MDM:   Vitals:    Vitals:    01/02/23 1432   BP: (!) 152/104   Pulse: 67   Resp: 24   Temp: 98.2 °F (36.8 °C)   TempSrc: Oral   SpO2: 100%   Weight: 225 lb 15.5 oz (102.5 kg)   Height: 5' 10\" (1.778 m)         MDM        The patient presents with severe pain at the base of the neck on the right radiating to the right shoulder and down the right arm. Pain is consistent with cervical radiculopathy but the patient is experiencing severe pain, unable to sit still, pacing around the room. He is holding his right arm above his head which seems to relieve some of the pain. He rates his pain a 10/10 intensity. He is neurologically intact. There are no motor or sensory deficits. He does have strong radial pulses bilaterally and the right upper extremity is pink and warm. Given the severity of his pain and distribution of the pain, CTA chest abdomen and pelvis was obtained to rule out dissection. No associated chest pain or shortness of breath. He was medicated with Dilaudid 1 mg IV, Zofran 4 mg IV, Robaxin 1500 mg p.o., and Decadron 10 mg IV. EKG was obtained which reveals normal sinus rhythm. Rate 61. No ST elevation. Suspicion for acute coronary syndrome is very low. Is this patient to be included in the SEP-1 Core Measure due to severe sepsis or septic shock?    No   Exclusion criteria - the patient is NOT to be included for SEP-1 Core Measure due to: Infection is not suspected      REASSESSMENT          4:12 PM: The patient had some slight pruritus and a couple of small hives at the IV site after receiving Dilaudid. He had also received Decadron. I doubt that the Decadron would be the source of this reaction. No evidence of bronchospasm. He is hemodynamically stable. No evidence of anaphylaxis. He was given a dose of Benadryl 25 mg IV and Pepcid 20 mg IV.    7:40 PM: I spoke with Dr. Rebeka Hinson who is on-call for [facility] neurosurgery. The patient will need MRI of the cervical spine. Currently no evidence of cord compromise weakness or numbness but he does have intractable pain. No evidence of cauda equina syndrome. Still rates his pain a 7/10 intensity. He will require admission for pain control and MRI in the morning. He will be transferred to [facility] for neurosurgical evaluation and MRI, and pain control. A call was placed to the hospitalist on-call at [facility].  In addition, it should be noted that there are no beds currently available here at [facility] as we are at capacity. The patient would likely not be able to get a bed here until tomorrow afternoon. 8:19 PM: I spoke with Dr. Dominick Bean at [facility]. who agrees to accept the patient for transfer. He will be transferred by BLS ambulance. The patient is stable for transfer. CRITICAL CARE TIME   Total Critical Care time was 35 minutes, excluding separately reportable procedures. There was a high probability of clinically significant/life threatening deterioration in the patient's condition which required my urgent intervention. CONSULTS:  IP CONSULT TO NEUROSURGERY    PROCEDURES:  Unless otherwise noted below, none     Procedures      FINAL IMPRESSION      1. Neck pain    2.  Cervical radiculopathy          DISPOSITION/PLAN   DISPOSITION Decision To Transfer 01/02/2023 07:59:35 PM      PATIENT REFERRED TO:  No follow-up provider specified. DISCHARGE MEDICATIONS:  New Prescriptions    No medications on file     Controlled Substances Monitoring:     No flowsheet data found. (Please note that portions of this note were completed with a voice recognition program.  Efforts were made to edit the dictations but occasionally words are mis-transcribed. )    18[address] Oh Amin DO (electronically signed)  Attending Emergency Physician           Brooke Crump DO  01/02/23 2022

## 2023-01-02 NOTE — ED NOTES
PT HAD HIVES ON LEFT ARM AFTER IV MEDS GIVEN. PT DENIES RESPIRATORY ISSUES. DR Jenniffer Romero NOTIFIED.       Asim Buenrostro RN  01/02/23 0257

## 2023-01-03 ENCOUNTER — APPOINTMENT (OUTPATIENT)
Dept: INTERVENTIONAL RADIOLOGY/VASCULAR | Age: 53
DRG: 347 | End: 2023-01-03
Attending: INTERNAL MEDICINE
Payer: COMMERCIAL

## 2023-01-03 ENCOUNTER — APPOINTMENT (OUTPATIENT)
Dept: MRI IMAGING | Age: 53
DRG: 347 | End: 2023-01-03
Attending: INTERNAL MEDICINE
Payer: COMMERCIAL

## 2023-01-03 LAB
ANION GAP SERPL CALCULATED.3IONS-SCNC: 10 MMOL/L (ref 3–16)
BASOPHILS ABSOLUTE: 0.1 K/UL (ref 0–0.2)
BASOPHILS RELATIVE PERCENT: 0.7 %
BUN BLDV-MCNC: 17 MG/DL (ref 7–20)
CALCIUM SERPL-MCNC: 9.6 MG/DL (ref 8.3–10.6)
CHLORIDE BLD-SCNC: 103 MMOL/L (ref 99–110)
CO2: 24 MMOL/L (ref 21–32)
CREAT SERPL-MCNC: 0.9 MG/DL (ref 0.9–1.3)
EKG ATRIAL RATE: 61 BPM
EKG DIAGNOSIS: NORMAL
EKG P AXIS: 67 DEGREES
EKG P-R INTERVAL: 132 MS
EKG Q-T INTERVAL: 420 MS
EKG QRS DURATION: 110 MS
EKG QTC CALCULATION (BAZETT): 422 MS
EKG R AXIS: 52 DEGREES
EKG T AXIS: 13 DEGREES
EKG VENTRICULAR RATE: 61 BPM
EOSINOPHILS ABSOLUTE: 0 K/UL (ref 0–0.6)
EOSINOPHILS RELATIVE PERCENT: 0 %
GFR SERPL CREATININE-BSD FRML MDRD: >60 ML/MIN/{1.73_M2}
GLUCOSE BLD-MCNC: 161 MG/DL (ref 70–99)
HCT VFR BLD CALC: 49 % (ref 40.5–52.5)
HEMOGLOBIN: 16.5 G/DL (ref 13.5–17.5)
INR BLD: 0.99 (ref 0.87–1.14)
LYMPHOCYTES ABSOLUTE: 1.8 K/UL (ref 1–5.1)
LYMPHOCYTES RELATIVE PERCENT: 13.1 %
MCH RBC QN AUTO: 29 PG (ref 26–34)
MCHC RBC AUTO-ENTMCNC: 33.7 G/DL (ref 31–36)
MCV RBC AUTO: 86.1 FL (ref 80–100)
MONOCYTES ABSOLUTE: 0.6 K/UL (ref 0–1.3)
MONOCYTES RELATIVE PERCENT: 4.2 %
NEUTROPHILS ABSOLUTE: 11.5 K/UL (ref 1.7–7.7)
NEUTROPHILS RELATIVE PERCENT: 82 %
PDW BLD-RTO: 13.7 % (ref 12.4–15.4)
PLATELET # BLD: 203 K/UL (ref 135–450)
PMV BLD AUTO: 9.3 FL (ref 5–10.5)
POTASSIUM REFLEX MAGNESIUM: 4.1 MMOL/L (ref 3.5–5.1)
PROTHROMBIN TIME: 13 SEC (ref 11.7–14.5)
RBC # BLD: 5.69 M/UL (ref 4.2–5.9)
SODIUM BLD-SCNC: 137 MMOL/L (ref 136–145)
TSH REFLEX: 1.2 UIU/ML (ref 0.27–4.2)
WBC # BLD: 14.1 K/UL (ref 4–11)

## 2023-01-03 PROCEDURE — 36415 COLL VENOUS BLD VENIPUNCTURE: CPT

## 2023-01-03 PROCEDURE — 6360000002 HC RX W HCPCS: Performed by: INTERNAL MEDICINE

## 2023-01-03 PROCEDURE — 6370000000 HC RX 637 (ALT 250 FOR IP): Performed by: INTERNAL MEDICINE

## 2023-01-03 PROCEDURE — 72141 MRI NECK SPINE W/O DYE: CPT

## 2023-01-03 PROCEDURE — 93010 ELECTROCARDIOGRAM REPORT: CPT | Performed by: INTERNAL MEDICINE

## 2023-01-03 PROCEDURE — 6360000004 HC RX CONTRAST MEDICATION: Performed by: STUDENT IN AN ORGANIZED HEALTH CARE EDUCATION/TRAINING PROGRAM

## 2023-01-03 PROCEDURE — 2580000003 HC RX 258: Performed by: INTERNAL MEDICINE

## 2023-01-03 PROCEDURE — 84443 ASSAY THYROID STIM HORMONE: CPT

## 2023-01-03 PROCEDURE — 2580000003 HC RX 258

## 2023-01-03 PROCEDURE — 1200000000 HC SEMI PRIVATE

## 2023-01-03 PROCEDURE — 2709999900 HC NON-CHARGEABLE SUPPLY

## 2023-01-03 PROCEDURE — 85610 PROTHROMBIN TIME: CPT

## 2023-01-03 PROCEDURE — 6360000002 HC RX W HCPCS

## 2023-01-03 PROCEDURE — 85025 COMPLETE CBC W/AUTO DIFF WBC: CPT

## 2023-01-03 PROCEDURE — 62321 NJX INTERLAMINAR CRV/THRC: CPT

## 2023-01-03 PROCEDURE — 2500000003 HC RX 250 WO HCPCS

## 2023-01-03 PROCEDURE — 99222 1ST HOSP IP/OBS MODERATE 55: CPT | Performed by: NURSE PRACTITIONER

## 2023-01-03 PROCEDURE — 6370000000 HC RX 637 (ALT 250 FOR IP): Performed by: NURSE PRACTITIONER

## 2023-01-03 PROCEDURE — 3E0R33Z INTRODUCTION OF ANTI-INFLAMMATORY INTO SPINAL CANAL, PERCUTANEOUS APPROACH: ICD-10-PCS | Performed by: STUDENT IN AN ORGANIZED HEALTH CARE EDUCATION/TRAINING PROGRAM

## 2023-01-03 PROCEDURE — 80048 BASIC METABOLIC PNL TOTAL CA: CPT

## 2023-01-03 RX ORDER — DIAZEPAM 5 MG/1
5 TABLET ORAL EVERY 6 HOURS PRN
Status: DISCONTINUED | OUTPATIENT
Start: 2023-01-03 | End: 2023-01-06

## 2023-01-03 RX ORDER — QUETIAPINE FUMARATE 25 MG/1
50 TABLET, FILM COATED ORAL NIGHTLY
Status: DISCONTINUED | OUTPATIENT
Start: 2023-01-03 | End: 2023-01-08 | Stop reason: HOSPADM

## 2023-01-03 RX ORDER — CLONAZEPAM 1 MG/1
1 TABLET ORAL EVERY 12 HOURS PRN
Status: DISCONTINUED | OUTPATIENT
Start: 2023-01-03 | End: 2023-01-08 | Stop reason: HOSPADM

## 2023-01-03 RX ORDER — LEVOTHYROXINE SODIUM 0.12 MG/1
125 TABLET ORAL DAILY
Status: DISCONTINUED | OUTPATIENT
Start: 2023-01-03 | End: 2023-01-08 | Stop reason: HOSPADM

## 2023-01-03 RX ADMIN — MORPHINE SULFATE 4 MG: 2 INJECTION, SOLUTION INTRAMUSCULAR; INTRAVENOUS at 15:24

## 2023-01-03 RX ADMIN — DIAZEPAM 5 MG: 5 TABLET ORAL at 22:35

## 2023-01-03 RX ADMIN — OXYCODONE 5 MG: 5 TABLET ORAL at 17:16

## 2023-01-03 RX ADMIN — MORPHINE SULFATE 4 MG: 2 INJECTION, SOLUTION INTRAMUSCULAR; INTRAVENOUS at 19:54

## 2023-01-03 RX ADMIN — OXYCODONE 5 MG: 5 TABLET ORAL at 03:46

## 2023-01-03 RX ADMIN — GABAPENTIN 100 MG: 100 CAPSULE ORAL at 19:54

## 2023-01-03 RX ADMIN — DIAZEPAM 5 MG: 5 TABLET ORAL at 08:13

## 2023-01-03 RX ADMIN — MORPHINE SULFATE 4 MG: 2 INJECTION, SOLUTION INTRAMUSCULAR; INTRAVENOUS at 05:36

## 2023-01-03 RX ADMIN — CLONAZEPAM 1 MG: 1 TABLET ORAL at 10:54

## 2023-01-03 RX ADMIN — GABAPENTIN 100 MG: 100 CAPSULE ORAL at 14:48

## 2023-01-03 RX ADMIN — SODIUM CHLORIDE, PRESERVATIVE FREE 10 ML: 5 INJECTION INTRAVENOUS at 10:40

## 2023-01-03 RX ADMIN — METHOCARBAMOL 1000 MG: 100 INJECTION, SOLUTION INTRAMUSCULAR; INTRAVENOUS at 14:52

## 2023-01-03 RX ADMIN — QUETIAPINE FUMARATE 50 MG: 25 TABLET ORAL at 19:54

## 2023-01-03 RX ADMIN — SODIUM CHLORIDE, PRESERVATIVE FREE 10 ML: 5 INJECTION INTRAVENOUS at 19:21

## 2023-01-03 RX ADMIN — LEVOTHYROXINE SODIUM 125 MCG: 0.12 TABLET ORAL at 10:39

## 2023-01-03 RX ADMIN — OXYCODONE 5 MG: 5 TABLET ORAL at 08:13

## 2023-01-03 RX ADMIN — OXYCODONE 5 MG: 5 TABLET ORAL at 13:01

## 2023-01-03 RX ADMIN — MORPHINE SULFATE 4 MG: 2 INJECTION, SOLUTION INTRAMUSCULAR; INTRAVENOUS at 01:07

## 2023-01-03 RX ADMIN — GABAPENTIN 100 MG: 100 CAPSULE ORAL at 08:13

## 2023-01-03 RX ADMIN — METHOCARBAMOL 1000 MG: 100 INJECTION, SOLUTION INTRAMUSCULAR; INTRAVENOUS at 09:58

## 2023-01-03 RX ADMIN — SODIUM CHLORIDE: 9 INJECTION, SOLUTION INTRAVENOUS at 09:57

## 2023-01-03 RX ADMIN — OXYCODONE 5 MG: 5 TABLET ORAL at 22:35

## 2023-01-03 RX ADMIN — MORPHINE SULFATE 4 MG: 2 INJECTION, SOLUTION INTRAMUSCULAR; INTRAVENOUS at 10:13

## 2023-01-03 RX ADMIN — IOHEXOL 10 ML: 180 INJECTION INTRAVENOUS at 09:39

## 2023-01-03 ASSESSMENT — PAIN - FUNCTIONAL ASSESSMENT
PAIN_FUNCTIONAL_ASSESSMENT: PREVENTS OR INTERFERES SOME ACTIVE ACTIVITIES AND ADLS
PAIN_FUNCTIONAL_ASSESSMENT: ACTIVITIES ARE NOT PREVENTED
PAIN_FUNCTIONAL_ASSESSMENT: ACTIVITIES ARE NOT PREVENTED
PAIN_FUNCTIONAL_ASSESSMENT: PREVENTS OR INTERFERES SOME ACTIVE ACTIVITIES AND ADLS
PAIN_FUNCTIONAL_ASSESSMENT: ACTIVITIES ARE NOT PREVENTED
PAIN_FUNCTIONAL_ASSESSMENT: ACTIVITIES ARE NOT PREVENTED
PAIN_FUNCTIONAL_ASSESSMENT: PREVENTS OR INTERFERES SOME ACTIVE ACTIVITIES AND ADLS
PAIN_FUNCTIONAL_ASSESSMENT: ACTIVITIES ARE NOT PREVENTED
PAIN_FUNCTIONAL_ASSESSMENT: ACTIVITIES ARE NOT PREVENTED

## 2023-01-03 ASSESSMENT — PAIN DESCRIPTION - FREQUENCY
FREQUENCY: CONTINUOUS

## 2023-01-03 ASSESSMENT — PAIN DESCRIPTION - ONSET
ONSET: ON-GOING

## 2023-01-03 ASSESSMENT — PAIN SCALES - GENERAL
PAINLEVEL_OUTOF10: 5
PAINLEVEL_OUTOF10: 9
PAINLEVEL_OUTOF10: 9
PAINLEVEL_OUTOF10: 6
PAINLEVEL_OUTOF10: 5
PAINLEVEL_OUTOF10: 7
PAINLEVEL_OUTOF10: 8
PAINLEVEL_OUTOF10: 4
PAINLEVEL_OUTOF10: 7
PAINLEVEL_OUTOF10: 8
PAINLEVEL_OUTOF10: 4
PAINLEVEL_OUTOF10: 10
PAINLEVEL_OUTOF10: 4
PAINLEVEL_OUTOF10: 4
PAINLEVEL_OUTOF10: 6
PAINLEVEL_OUTOF10: 3
PAINLEVEL_OUTOF10: 8

## 2023-01-03 ASSESSMENT — PAIN DESCRIPTION - DIRECTION
RADIATING_TOWARDS: FINGERS

## 2023-01-03 ASSESSMENT — PAIN DESCRIPTION - ORIENTATION
ORIENTATION: RIGHT

## 2023-01-03 ASSESSMENT — PAIN DESCRIPTION - PAIN TYPE
TYPE: ACUTE PAIN

## 2023-01-03 ASSESSMENT — PAIN DESCRIPTION - DESCRIPTORS
DESCRIPTORS: ACHING
DESCRIPTORS: ACHING
DESCRIPTORS: SHARP
DESCRIPTORS: ACHING
DESCRIPTORS: SHARP
DESCRIPTORS: THROBBING;TINGLING
DESCRIPTORS: STABBING

## 2023-01-03 ASSESSMENT — PAIN DESCRIPTION - LOCATION
LOCATION: SHOULDER
LOCATION: ARM
LOCATION: SHOULDER
LOCATION: ARM;SHOULDER
LOCATION: BACK
LOCATION: SHOULDER

## 2023-01-03 NOTE — PROGRESS NOTES
Patient states he is unable to sit in the bed and needs to get up to walk. Alarm set off. Patient up, ambulating in room. Staff in room. Physician made aware.

## 2023-01-03 NOTE — PROGRESS NOTES
Hospitalist Progress Note      PCP: Laura Vizcarra MD    Date of Admission: 1/2/2023    Chief Complaint: upper back and neck pain radiating to right upper extremity. This is associated with numbness and tingling in the right arm and decreased in the strength of handgrip. Subjective:     Patient underwent C6-7 translaminar epidural injection of celestone and sensorcaine this morning. He reports pain to neck region that radiates to right upper extremity with numbness and tingling of fingers (varies in involvement) -- symptoms with movements such as abduction of right upper extremity. He may develop some weakness with hand . He denies history of trauma, injury. He denies recent fevers, chills, lightheadedness, shortness of breath, palpitations. He denies nausea, abdominal pain. He is urinating and having bowel movements.     Medications:  Reviewed    Infusion Medications    sodium chloride 50 mL/hr at 01/03/23 0957     Scheduled Medications    QUEtiapine  50 mg Oral Nightly    methocarbamol IVPB  1,000 mg IntraVENous Q8H    gabapentin  100 mg Oral TID    sodium chloride flush  5-40 mL IntraVENous 2 times per day    [Held by provider] enoxaparin  40 mg SubCUTAneous Daily     PRN Meds: diazePAM, clonazePAM, morphine, oxyCODONE, sodium chloride flush, sodium chloride, ondansetron **OR** ondansetron, polyethylene glycol, acetaminophen **OR** acetaminophen      Intake/Output Summary (Last 24 hours) at 1/3/2023 1010  Last data filed at 1/3/2023 0539  Gross per 24 hour   Intake 360 ml   Output --   Net 360 ml       Physical Exam Performed:    BP (!) 143/79   Pulse 62   Temp 97.6 °F (36.4 °C) (Oral)   Resp 20   Ht 5' 10\" (1.778 m)   SpO2 96%   BMI 32.42 kg/m²       GEN alert, in no distress  HEENT normocephalic, anicteric sclera, EOMI, mucosa moist, no stridor  NECK supple, trachea midline  RESP on RA, in no distress, clear to auscultation  CARDS RRR, S1, S2, no murmurs, no edema, radial pulse 2+, DP pulse 2+  ABD +BS, soft nontender  MSK non-tender along spine; no cyanosis, no clubbing  SKIN warm, dry  NEURO alert, oriented x 3, no facial asymmetry, no dysarthria, moving spontaneously  PSYCH normal mood        Labs:   Recent Labs     01/02/23  1552 01/03/23  0644   WBC 8.9 14.1*   HGB 16.9 16.5   HCT 50.4 49.0    203     Recent Labs     01/02/23  1552 01/03/23  0644    137   K 3.7 4.1    103   CO2 22 24   BUN 12 17   CREATININE 0.9 0.9   CALCIUM 9.3 9.6     Recent Labs     01/02/23  1552   AST 23   ALT 31   BILITOT 0.4   ALKPHOS 94     Recent Labs     01/03/23  0644   INR 0.99     Recent Labs     01/02/23  1552   TROPONINI <0.01       Urinalysis:      Lab Results   Component Value Date/Time    NITRU Negative 12/07/2021 01:11 AM    WBCUA 8 12/07/2021 01:11 AM    RBCUA 0-2 12/07/2021 01:11 AM    BLOODU Negative 12/07/2021 01:11 AM    SPECGRAV 1.029 12/07/2021 01:11 AM    GLUCOSEU 500 12/07/2021 01:11 AM       Radiology:  IR INJ CERVICAL/THORACIC TRANSFORAMINAL EPIDURAL SINGLE LEVEL   Final Result   Impression:   Successful C6-7 translaminar epidural injection of Celestone and Sensorcaine. Pre-procedure pain score : 8   Post-procedure pain score : 2      Fluoroscopy time : 0.8 minutes   Number of exposures obtained : 6   Blood loss : minimal (less than 5 cc)   Specimens removed : none         MRI CERVICAL SPINE WO CONTRAST   Final Result      1. Multilevel cervical spondylosis with disc bulge and hypertrophic spurring greatest at the C6-C7 level causing moderate central canal stenosis with cord compression. Multilevel severe neuroforaminal narrowing as detailed above.                  Assessment/Plan:    Active Hospital Problems    Diagnosis Date Noted    Cervical stenosis of spine [M48.02] 01/02/2023     Priority: Medium     The patient is a 50 y.o. male with history of ADHD, anxiety, depression, PTSD, HTN, hypothyroidism, chronic back pain, who presents with several days of severe upper back and neck pain radiating to right upper extremity. This is associated with numbness and tingling in the right arm and decreased in the strength of handgrip. Patient denies any injuries to his back and he has had prior surgeries on both shoulders and he has been very careful and avoiding heavy lifting or strenuous workouts. There is been no car injuries. Patient was seen at outside hospital on 12/30; at that time he underwent evaluation of his right shoulder and was referred for orthopedic outpatient evaluation. ER physician reports that patient was in significant pain, reporting radiating pain up and down his back and his shoulders. Patient had CT chest abdomen pelvis with contrast which ruled out dissection. He required several doses IV pain medications to get comfortable. Imaging of cervical spine revealed osteophytes and canal stenosis. Neurosurgery was consulted and recommended obtaining MRI and transfer to [facility] for further evaluation. Cervical Radiculopathy  - CT Cervical spine wo contrast 1/2/23 showed Posterior osteophyte and possibly a mild degree of broad posterior disc extension C6-T1 resulting in a degree of central canal stenosis. No evidence of fracture with degenerative changes.  - MRI of Cervical Spine wo contrast 1/3/23 showed multilevel cervical spondylosis with disc bulge and hypertrophic spurring greatest at the C6-C7 level causing moderate central canal stenosis with cord compression. Multilevel severe neuroforaminal narrowing.  - Received decadron 10 mg (1/2) in ED.  - Underwent epidural steroid injection on 1/3/22.  - Started gabapentin 100 mg TID, robaxin 100 mg Q8H.  - Neurosurgery recommended patient remain inpatient to monitor symptoms. Hypertension  - Continue home lisinopril 5 mg daily. Hypothyroidism  - Continue home levothyroxine 125 mg daily. - tsh.     Anxiety  Depression  PTSD  - Continue seroquel XL 50 mg HS, klonopin 1 mg Q12H PRN.    DVT Prophylaxis:   Diet: ADULT DIET; Regular  Code Status: Full Code    PT/OT Eval Status: PT/OT    Dispo - after medically improved.     Hardy Mejia MD

## 2023-01-03 NOTE — CONSULTS
NEUROSURGERY CONSULT NOTE    Karel Del Toro  [id number]   1970   1/3/2023    Requesting physician: Edouard Gonzalez MD    Reason for consultation:right arm, neck pain    History of present illness: The patient is a 50 y.o. male with medical history as below who presents to [facility] with several day history of severe upper back and neck pain radiating down R arm to last 2 digits. This is associated with numbness and tingling in the right arm and decreased in the strength of handgrip. Patient denies any injuries to his back and he has had prior surgeries on both shoulders and he has been very careful and avoiding heavy lifting or strenuous workouts. There has been no car injuries. Patient was seen at outside hospital on 12/30, records reviewed, at that time he underwent evaluation of his right shoulder and was referred for orthopedic outpatient evaluation. Patient in significant pain, reporting radiating pain down R arm including his scapula area and his shoulder, He required several doses IV pain medications to get comfortable. Imaging of cervical spine revealed osteophytes and canal stenosis. Neurosurgery was consulted for evaluation. ROS:   GENERAL:  Denies fever or recent illness.  Denies weight changes   EYES:  Denies vision change or diplopia  EARS:  Denies hearing loss  CARDIAC:  Denies chest pain  RESPIRATORY:  Denies shortness of breath  SKIN:  Denies rash or lesions   HEM:  Denies excessive bruising  PSYCH:  Denies anxiety or depression  NEURO:  Denies headache, + numbness or tingling and weakness RUE  :  Denies urinary difficulty  GI: Denies nausea, vomiting, diarrhea or constipation  MUSCULOSKELETAL:  No arthralgias    Allergies   Allergen Reactions    Bactrim [Sulfamethoxazole-Trimethoprim] Anaphylaxis    Prednisone        Past Medical History:   Diagnosis Date    ADHD (attention deficit hyperactivity disorder)     Anxiety     Chronic back pain     Depression     Erectile dysfunction Headache     Hypertension     Hypothyroidism     Low testosterone     Low testosterone level in male     Neuropathy     PTSD (post-traumatic stress disorder)     Thyroid disease         Past Surgical History:   Procedure Laterality Date    BICEPS TENDON REPAIR Left     SHOULDER SURGERY Right        Social History     Occupational History    Not on file   Tobacco Use    Smoking status: Former     Types: Cigarettes     Quit date: 12/3/2012     Years since quitting: 10.0    Smokeless tobacco: Never   Vaping Use    Vaping Use: Never used   Substance and Sexual Activity    Alcohol use: Yes     Comment: occoasional use/SOCIALLY    Drug use: Not Currently    Sexual activity: Yes     Partners: Female        Family History   Problem Relation Age of Onset    Diabetes Maternal Grandmother         Outpatient Medications Marked as Taking for the 1/2/23 encounter [facility] Encounter)   Medication Sig Dispense Refill    QUEtiapine (SEROQUEL XR) 50 MG extended release tablet Take 50 mg by mouth nightly      levothyroxine (SYNTHROID) 125 MCG tablet Take 125 mcg by mouth Daily      clonazePAM (KLONOPIN) 1 MG tablet Take 1 mg by mouth 2 times daily as needed.           Current Facility-Administered Medications   Medication Dose Route Frequency Provider Last Rate Last Admin    diazePAM (VALIUM) tablet 5 mg  5 mg Oral Q6H PRN Jennings Spina, APRN - CNP   5 mg at 01/03/23 0813    clonazePAM (KLONOPIN) tablet 1 mg  1 mg Oral Q12H PRN Shruthi Kinsey MD   1 mg at 01/03/23 1054    QUEtiapine (SEROQUEL) tablet 50 mg  50 mg Oral Nightly Shruthi Kinsey MD        levothyroxine (SYNTHROID) tablet 125 mcg  125 mcg Oral Daily Shruthi Kinsey MD   125 mcg at 01/03/23 1039    morphine (PF) injection 4 mg  4 mg IntraVENous Q4H PRN Nidhi Adrian MD   4 mg at 01/03/23 1013    oxyCODONE (ROXICODONE) immediate release tablet 5 mg  5 mg Oral Q4H PRN Nidhi Adrian MD   5 mg at 01/03/23 1301    methocarbamol (ROBAXIN) 1,000 mg in dextrose 5 % 100 mL IVPB 1,000 mg IntraVENous Fred Nguyen MD   Stopped at 01/03/23 1031    gabapentin (NEURONTIN) capsule 100 mg  100 mg Oral TID Maren Sahni MD   100 mg at 01/03/23 0813    sodium chloride flush 0.9 % injection 5-40 mL  5-40 mL IntraVENous 2 times per day Maren Sahni MD   10 mL at 01/03/23 1040    sodium chloride flush 0.9 % injection 5-40 mL  5-40 mL IntraVENous PRN Maren Sahni MD        0.9 % sodium chloride infusion   IntraVENous PRN Maren Sahni MD 50 mL/hr at 01/03/23 0957 New Bag at 01/03/23 0957    [Held by provider] enoxaparin (LOVENOX) injection 40 mg  40 mg SubCUTAneous Daily Maren Sahni MD        ondansetron (ZOFRAN-ODT) disintegrating tablet 4 mg  4 mg Oral Q8H PRN Maren Sahni MD        Or    ondansetron [facility]) injection 4 mg  4 mg IntraVENous Q6H PRN Maren Sahni MD        polyethylene glycol (GLYCOLAX) packet 17 g  17 g Oral Daily PRN Maren Sahni MD        acetaminophen (TYLENOL) tablet 650 mg  650 mg Oral Q6H PRN Maren Sahni MD        Or    acetaminophen (TYLENOL) suppository 650 mg  650 mg Rectal Q6H PRN Maren Sahni MD            Objective:  BP (!) 157/94   Pulse 66   Temp 98.2 °F (36.8 °C) (Oral)   Resp 20   Ht 5' 10\" (1.778 m)   SpO2 96%   BMI 32.42 kg/m²     Physical Exam:   Patient seen and examined  GCS:15  General: Well developed. Alert and cooperative in no acute distress. HENT: atraumatic, neck supple  Eyes: Optic discs: Not tested  Pulmonary: unlabored respiratory effort  Cardiovascular:  Warm well perfused.  No peripheral edema  Gastrointestinal: abdomen soft, NT, ND    Neurological:  Mental Status: Awake, alert, oriented x 4, speech clear and appropriate  Attention: Intact  Language: No aphasia or dysarthria noted  Sensation: Intact to all extremities to light touch  Coordination: Intact  DTRs:    Right  Left    Yip's - -   biceps  2 2   brachioradialis  2  2   Patella   2 2   ankle clonus  - -   toes (babinski)  d d     Cranial Nerves:  II: Visual acuity not tested, denies new visual changes / diplopia  III, IV, VI: PERRL, 3 mm bilaterally, EOMI, no nystagmus noted  V: Facial sensation intact bilaterally to touch  VII: Face symmetric  VIII: Hearing intact bilaterally to spoken voice  IX: Palate movement equal bilaterally  XI: Shoulder shrug equal bilaterally  XII: Tongue midline    Musculoskeletal:   Gait: Not tested   Assist devices: None   Tone: normal  Motor strength:    Right  Left    Right  Left    Deltoid  5 5  Hip Flex  5 5   Biceps  5 5  Knee Extensors  5 5   Triceps  5 5  Knee Flexors  5 5   Wrist Ext  5 5  Ankle Dorsiflex. 5 5   Wrist Flex  5 5  Ankle Plantarflex. 5 5   Handgrip  4 5  Ext Michael Longus  5 5   Thumb Ext  5 5         Radiological Findings:  CT CERVICAL SPINE WO CONTRAST    Result Date: 1/2/2023  1. Posterior osteophyte and possibly a mild degree of broad posterior disc extension C6-T1 resulting in a degree of central canal stenosis. 2.  No evidence of fracture with degenerative changes as described. MRI CERVICAL SPINE WO CONTRAST    Result Date: 1/3/2023  1. Multilevel cervical spondylosis with disc bulge and hypertrophic spurring greatest at the C6-C7 level causing moderate central canal stenosis with cord compression. Multilevel severe neuroforaminal narrowing as detailed above. CTA CHEST ABDOMEN PELVIS W CONTRAST    Result Date: 1/2/2023  1. No evidence of aortic aneurysm or dissection. 2.  No acute pathology noted within the chest, abdomen or pelvis. IR INJ CERVICAL/THORACIC TRANSFORAMINAL EPIDURAL SINGLE LEVEL    Result Date: 1/3/2023  Impression: Successful C6-7 translaminar epidural injection of Celestone and Sensorcaine.  Pre-procedure pain score : 8 Post-procedure pain score : 2 Fluoroscopy time : 0.8 minutes Number of exposures obtained : 6 Blood loss : minimal (less than 5 cc) Specimens removed : none        Labs:  Recent Labs     01/03/23  0644   WBC 14.1*   HGB 16.5   HCT 49.0          Recent Labs 01/03/23  0644      K 4.1      CO2 24   BUN 17   CREATININE 0.9   GLUCOSE 161*   CALCIUM 9.6       Recent Labs     01/03/23  0644   PROTIME 13.0   INR 0.99       Patient Active Problem List    Diagnosis Date Noted    Cervical stenosis of spine 01/02/2023    AMS (altered mental status) 12/03/2021    Hypertension 09/30/2019    Low testosterone in male 04/06/2018    Fatigue 04/06/2018    Hypothyroidism 04/06/2018    Insomnia 04/06/2018    Depression 04/06/2018       Assessment:  Patient is a 50 y.o. male w/c6-7 disc  with cord compression on R    Plan:  DINO today  Neurologic exams frequency: Q4H  Muscle spasms: scheduled Robaxin and Valium  Pain control: chapincito and morphine  PT/OT consulted, appreciate recs  NPO till after DINO then advance diet  Will see how the DINO helps  Thank you for consult. Will follow inpatient. Please call with any questions or decline in neurological status    DISPO: Remain inpatient from neurosurgery standpoint. Patient was discussed with Dr. Ania Longoria who agrees with above assessment and plan. Electronically signed by: SANTA Luna - CNP, SANTA-CNP, 1/3/2023 1:04 PM  [phone]   Time independently spent by Nurse Practitioner reviewing chart and prior testing, obtaining history from patient, examining patient, ordering appropriate medications / diagnostics, reviewing results of diagnostics, counseling and educating patient / family, communicating plan with other providers and documenting clinical information in the EMR was approximately 74 minutes.

## 2023-01-03 NOTE — H&P
Patient:  Adela Simental   :   1970      Relevant patient history reviewed and discussed. The procedure including risks and benefits was discussed at length with the patient (or designated family member) and all questions were answered. Informed consent to proceed with the procedure was given. Condition : stable    [facility] nurses notes reviewed and agreed. Medications reviewed. Allergies:    Allergies   Allergen Reactions    Bactrim [Sulfamethoxazole-Trimethoprim] Anaphylaxis    Prednisone

## 2023-01-03 NOTE — PROGRESS NOTES
Patient returned to room 5526. Was found out of bed. Educated on need for bedrest post-procedure per new order placed. Patient now in bed with alarm on.

## 2023-01-03 NOTE — PROGRESS NOTES
Patient up to bathroom without calling for staff. Alarms sounded. Patient found up to bathroom when staff in room. Reminded to call for staff. Patient shut bathroom door in staff member's face.

## 2023-01-03 NOTE — H&P
Hospital Medicine History & Physical      PCP: Vince Elena MD    Date of Admission: 1/2/2023    Date of Service: Pt seen/examined on 1/2/2023 and Admitted to Inpatient with expected LOS greater than two midnights due to medical therapy. Chief Complaint: Neck pain going down to right arm      History Of Present Illness: The patient is a 50 y.o. male with medical history as below who presents to [facility] with several day history of severe upper back and neck pain radiating to right upper extremity. This is associated with numbness and tingling in the right arm and decreased in the strength of handgrip. Patient denies any injuries to his back and he has had prior surgeries on both shoulders and he has been very careful and avoiding heavy lifting or strenuous workouts. There is been no car injuries. Patient was seen at outside hospital on 12/30, records reviewed, at that time he underwent evaluation of his right shoulder and was referred for orthopedic outpatient evaluation. Discussed with emergency room physician who reports that patient was in significant pain, reporting radiating pain including up and down his back and his shoulders, patient had CT chest abdomen pelvis done with contrast which ruled out dissection. He required several doses IV pain medications to get comfortable. Imaging of cervical spine revealed osteophytes and canal stenosis. Neurosurgery was consulted and recommended obtaining MRI and transferred to [facility] for further evaluation.      Past Medical History:        Diagnosis Date    ADHD (attention deficit hyperactivity disorder)     Anxiety     Chronic back pain     Depression     Erectile dysfunction     Headache     Hypertension     Hypothyroidism     Low testosterone     Low testosterone level in male     Neuropathy     PTSD (post-traumatic stress disorder)     Thyroid disease        Past Surgical History:        Procedure Laterality Date    BICEPS TENDON REPAIR Left     SHOULDER SURGERY Right        Medications Prior to Admission:    Prior to Admission medications    Medication Sig Start Date End Date Taking? Authorizing Provider   lisinopril (PRINIVIL;ZESTRIL) 5 MG tablet Take 1 tablet by mouth daily 12/9/21   Arabella Wallace MD   fluticasone [facility]) 50 MCG/ACT nasal spray 2 sprays by Each Nostril route daily  Patient not taking: Reported on 2/10/2022 12/5/21   Jacklyn Fernandez MD   acetaminophen (TYLENOL) 500 MG tablet Take 500 mg by mouth every 6 hours as needed  Patient not taking: Reported on 2/10/2022 7/9/21   Historical Provider, MD       Allergies:  Bactrim [sulfamethoxazole-trimethoprim]    Social History:  The patient currently lives at home with family    TOBACCO:   reports that he quit smoking about 10 years ago. His smoking use included cigarettes. He has never used smokeless tobacco.  ETOH:   reports current alcohol use. Family History:  Reviewed in detail and Positive as follows:        Problem Relation Age of Onset    Diabetes Maternal Grandmother        REVIEW OF SYSTEMS:   Positive and negative as noted in the HPI. All other systems reviewed and negative. PHYSICAL EXAM:    BP (!) 147/87   Pulse 61   Temp 97.6 °F (36.4 °C) (Oral)   Resp 20   SpO2 95%     General appearance: Moderate distress due to pain appears stated age and cooperative. HEENT Normal cephalic, atraumatic without obvious deformity. Pupils equal, round, and reactive to light. Extra ocular muscles intact. Conjunctivae/corneas clear. Neck: Supple, No jugular venous distention/bruits. Trachea midline without thyromegaly or adenopathy with full range of motion. Lungs: Clear to auscultation, bilaterally without Rales/Wheezes/Rhonchi with good respiratory effort.   Heart: Regular rate and rhythm with Normal S1/S2 without murmurs, rubs or gallops, point of maximum impulse non-displaced  Abdomen: Soft, non-tender or non-distended without rigidity or guarding and positive bowel sounds all four quadrants. Extremities: No clubbing, cyanosis, or edema bilaterally. Skin: Skin color, texture, turgor normal.  No rashes or lesions. Neurologic: Alert and oriented X 3, there is decrease in hand  on the right, with subjective numbness and tingling of right upper extremity  Mental status: Alert, oriented, thought content appropriate. Capillary refill is brisk  Peripheral pulses 2+    CT c spine:        1. Posterior osteophyte and possibly a mild degree of broad posterior disc   extension C6-T1 resulting in a degree of central canal stenosis. 2.  No evidence of fracture with degenerative changes as described. Labs reviewed as below;      CBC   Recent Labs     01/02/23  1552   WBC 8.9   HGB 16.9   HCT 50.4         RENAL  Recent Labs     01/02/23  1552      K 3.7      CO2 22   BUN 12   CREATININE 0.9     LFT'S  Recent Labs     01/02/23  1552   AST 23   ALT 31   BILITOT 0.4   ALKPHOS 94     COAG  No results for input(s): INR in the last 72 hours. CARDIAC ENZYMES  Recent Labs     01/02/23  1552   TROPONINI <0.01       U/A:    Lab Results   Component Value Date/Time    COLORU DK YELLOW 12/07/2021 01:11 AM    WBCUA 8 12/07/2021 01:11 AM    RBCUA 0-2 12/07/2021 01:11 AM    CLARITYU CLOUDY 12/07/2021 01:11 AM    SPECGRAV 1.029 12/07/2021 01:11 AM    LEUKOCYTESUR Negative 12/07/2021 01:11 AM    BLOODU Negative 12/07/2021 01:11 AM    GLUCOSEU 500 12/07/2021 01:11 AM       ABG  No results found for: WLS9NRU, BEART, X5DHJOXF, PHART, THGBART, ETX5MYF, PO2ART, JFZ1FZQ        Active Hospital Problems    Diagnosis Date Noted    Cervical stenosis of spine [M48.02] 01/02/2023     Priority: Medium         ASSESSMENT/PLAN:    Acute back/neck pain with radiculopathy to right upper extremity associated with numbness and decrease in hand  strength:   Will monitor with neurochecks every 4 hours  Obtain MRI  Consulted neurosurgery  Patient will need IV analgesia, will try also gabapentin and muscle relaxant round-the-clock  Status post dexamethasone 10 mg in the emergency room    Hypertension:  Continue lisinopril after home meds are reconciled    DVT Prophylaxis: Lovenox  Diet: ADULT DIET; Regular  Code Status: Full Code  PT/OT Eval Status: At baseline    Dispo -inpatient       Angeles Loja MD    Thank you Abdon Griffin MD for the opportunity to be involved in this patient's care. If you have any questions or concerns please feel free to contact me at [phone].

## 2023-01-03 NOTE — PROGRESS NOTES
Pt a/o x4. VSS on room air. Pain managed with PRN oral and IV pain medications per MAR. Voiding adequately via BRP. Tolerating ambulation SBA. Fall precautions in place.

## 2023-01-03 NOTE — PROGRESS NOTES
4 Eyes Admission Assessment     I agree as the admission nurse that 2 RN's have performed a thorough Head to Toe Skin Assessment on the patient. ALL assessment sites listed below have been assessed on admission. Areas assessed by both nurses:   [x]   Head, Face, and Ears   [x]   Shoulders, Back, and Chest  [x]   Arms, Elbows, and Hands   [x]   Coccyx, Sacrum, and Ischium  [x]   Legs, Feet, and Heels        Does the Patient have Skin Breakdown?   No         Chucky Prevention initiated:  No   Wound Care Orders initiated:  No      [facility] nurse consulted for Pressure Injury (Stage 3,4, Unstageable, DTI, NWPT, and Complex wounds) or Chucky score 18 or lower:  No      Nurse 1 eSignature: Electronically signed by Makenzie Luu RN on 1/3/23 at 1:47 AM EST    **SHARE this note so that the co-signing nurse is able to place an eSignature**    Nurse 2 eSignature: Electronically signed by Leonidas Carrera RN on 1/3/23 at 1:47 AM EST

## 2023-01-03 NOTE — ED NOTES
ED SBAR report provider to [facility] KHADIJAH MCKENNA. Patient to be transported to Room 5526 via stretcher by [address] [facility]. By EMS, Patient transported with bedside cardiac monitor and with IV medications infusing. IV site clean, dry, and intact. MEWS score and pain assessed as 5/10 and documented. Updated patient and family on plan of care.      Neda Ganser, RN  01/02/23 3126

## 2023-01-03 NOTE — PROGRESS NOTES
Pt a/o x4. Managing pain with meds per MAR. Pt went down this morning to get cervical epidural steroid injection. Pain has gotten better at times. No acute events this shift. Wife was at bedside today. Her number is written down on the dry erase board, she has asked to call her with updates. Fall precautions in place. Will continue to monitor.

## 2023-01-03 NOTE — PROGRESS NOTES
Patient reminded to call staff prior to getting up. Requested bed/chair alarms be turned off. Patient educated again on safety need for alarms and staff with patient when up. Family present for education reinforcement as well.

## 2023-01-03 NOTE — PROGRESS NOTES
Patient up in hallway without assistance. Staff informed patient that he cannot be in david. Patient A/O x4, steady on feet. Per physician ok to be up ad vanessa.

## 2023-01-04 PROCEDURE — 6360000002 HC RX W HCPCS: Performed by: INTERNAL MEDICINE

## 2023-01-04 PROCEDURE — 2580000003 HC RX 258: Performed by: INTERNAL MEDICINE

## 2023-01-04 PROCEDURE — 6370000000 HC RX 637 (ALT 250 FOR IP): Performed by: INTERNAL MEDICINE

## 2023-01-04 PROCEDURE — 99232 SBSQ HOSP IP/OBS MODERATE 35: CPT | Performed by: NURSE PRACTITIONER

## 2023-01-04 PROCEDURE — 6370000000 HC RX 637 (ALT 250 FOR IP): Performed by: NURSE PRACTITIONER

## 2023-01-04 PROCEDURE — 1200000000 HC SEMI PRIVATE

## 2023-01-04 PROCEDURE — 6370000000 HC RX 637 (ALT 250 FOR IP): Performed by: NEUROLOGICAL SURGERY

## 2023-01-04 RX ORDER — LISINOPRIL 5 MG/1
5 TABLET ORAL DAILY
Status: DISCONTINUED | OUTPATIENT
Start: 2023-01-04 | End: 2023-01-08 | Stop reason: HOSPADM

## 2023-01-04 RX ORDER — OXYCODONE HYDROCHLORIDE 5 MG/1
10 TABLET ORAL EVERY 4 HOURS PRN
Status: DISCONTINUED | OUTPATIENT
Start: 2023-01-04 | End: 2023-01-08 | Stop reason: HOSPADM

## 2023-01-04 RX ADMIN — OXYCODONE 10 MG: 5 TABLET ORAL at 22:46

## 2023-01-04 RX ADMIN — OXYCODONE 5 MG: 5 TABLET ORAL at 09:34

## 2023-01-04 RX ADMIN — GABAPENTIN 100 MG: 100 CAPSULE ORAL at 14:35

## 2023-01-04 RX ADMIN — DIAZEPAM 5 MG: 5 TABLET ORAL at 20:19

## 2023-01-04 RX ADMIN — MORPHINE SULFATE 4 MG: 2 INJECTION, SOLUTION INTRAMUSCULAR; INTRAVENOUS at 21:22

## 2023-01-04 RX ADMIN — OXYCODONE 5 MG: 5 TABLET ORAL at 18:42

## 2023-01-04 RX ADMIN — METHOCARBAMOL 1000 MG: 100 INJECTION, SOLUTION INTRAMUSCULAR; INTRAVENOUS at 08:07

## 2023-01-04 RX ADMIN — MORPHINE SULFATE 4 MG: 2 INJECTION, SOLUTION INTRAMUSCULAR; INTRAVENOUS at 12:22

## 2023-01-04 RX ADMIN — CLONAZEPAM 1 MG: 1 TABLET ORAL at 03:41

## 2023-01-04 RX ADMIN — LISINOPRIL 5 MG: 5 TABLET ORAL at 10:33

## 2023-01-04 RX ADMIN — GABAPENTIN 100 MG: 100 CAPSULE ORAL at 08:08

## 2023-01-04 RX ADMIN — QUETIAPINE FUMARATE 50 MG: 25 TABLET ORAL at 20:19

## 2023-01-04 RX ADMIN — MORPHINE SULFATE 4 MG: 2 INJECTION, SOLUTION INTRAMUSCULAR; INTRAVENOUS at 16:51

## 2023-01-04 RX ADMIN — GABAPENTIN 100 MG: 100 CAPSULE ORAL at 20:19

## 2023-01-04 RX ADMIN — CLONAZEPAM 1 MG: 1 TABLET ORAL at 09:42

## 2023-01-04 RX ADMIN — SODIUM CHLORIDE, PRESERVATIVE FREE 10 ML: 5 INJECTION INTRAVENOUS at 08:09

## 2023-01-04 RX ADMIN — LEVOTHYROXINE SODIUM 125 MCG: 0.12 TABLET ORAL at 06:14

## 2023-01-04 RX ADMIN — DIAZEPAM 5 MG: 5 TABLET ORAL at 12:22

## 2023-01-04 RX ADMIN — OXYCODONE 5 MG: 5 TABLET ORAL at 14:35

## 2023-01-04 RX ADMIN — OXYCODONE 5 MG: 5 TABLET ORAL at 03:55

## 2023-01-04 RX ADMIN — METHOCARBAMOL 1000 MG: 100 INJECTION, SOLUTION INTRAMUSCULAR; INTRAVENOUS at 16:57

## 2023-01-04 RX ADMIN — DIAZEPAM 5 MG: 5 TABLET ORAL at 06:14

## 2023-01-04 RX ADMIN — MORPHINE SULFATE 4 MG: 2 INJECTION, SOLUTION INTRAMUSCULAR; INTRAVENOUS at 08:08

## 2023-01-04 RX ADMIN — MORPHINE SULFATE 4 MG: 2 INJECTION, SOLUTION INTRAMUSCULAR; INTRAVENOUS at 03:35

## 2023-01-04 ASSESSMENT — PAIN DESCRIPTION - FREQUENCY
FREQUENCY: CONTINUOUS

## 2023-01-04 ASSESSMENT — PAIN DESCRIPTION - DIRECTION
RADIATING_TOWARDS: DOWN ARM
RADIATING_TOWARDS: DOWN ARM

## 2023-01-04 ASSESSMENT — PAIN DESCRIPTION - ONSET
ONSET: ON-GOING

## 2023-01-04 ASSESSMENT — PAIN DESCRIPTION - LOCATION
LOCATION: BACK;SHOULDER
LOCATION: BACK
LOCATION: SHOULDER
LOCATION: BACK;SHOULDER
LOCATION: SHOULDER
LOCATION: BACK;SHOULDER
LOCATION: BACK;SHOULDER
LOCATION: BACK
LOCATION: BACK
LOCATION: BACK;SHOULDER
LOCATION: BACK;SHOULDER
LOCATION: BACK
LOCATION: BACK;SHOULDER

## 2023-01-04 ASSESSMENT — PAIN - FUNCTIONAL ASSESSMENT

## 2023-01-04 ASSESSMENT — PAIN DESCRIPTION - DESCRIPTORS
DESCRIPTORS: ACHING;STABBING
DESCRIPTORS: ACHING
DESCRIPTORS: ACHING
DESCRIPTORS: ACHING;STABBING
DESCRIPTORS: ACHING
DESCRIPTORS: ACHING;STABBING
DESCRIPTORS: ACHING;STABBING
DESCRIPTORS: ACHING
DESCRIPTORS: ACHING;STABBING
DESCRIPTORS: ACHING
DESCRIPTORS: ACHING
DESCRIPTORS: ACHING;STABBING

## 2023-01-04 ASSESSMENT — PAIN SCALES - GENERAL
PAINLEVEL_OUTOF10: 10
PAINLEVEL_OUTOF10: 8
PAINLEVEL_OUTOF10: 6
PAINLEVEL_OUTOF10: 10
PAINLEVEL_OUTOF10: 4
PAINLEVEL_OUTOF10: 4
PAINLEVEL_OUTOF10: 7
PAINLEVEL_OUTOF10: 8
PAINLEVEL_OUTOF10: 5
PAINLEVEL_OUTOF10: 4
PAINLEVEL_OUTOF10: 0
PAINLEVEL_OUTOF10: 5
PAINLEVEL_OUTOF10: 9
PAINLEVEL_OUTOF10: 7
PAINLEVEL_OUTOF10: 6
PAINLEVEL_OUTOF10: 10
PAINLEVEL_OUTOF10: 8

## 2023-01-04 ASSESSMENT — PAIN DESCRIPTION - PAIN TYPE
TYPE: ACUTE PAIN

## 2023-01-04 ASSESSMENT — PAIN DESCRIPTION - ORIENTATION
ORIENTATION: RIGHT
ORIENTATION: RIGHT;UPPER
ORIENTATION: RIGHT
ORIENTATION: RIGHT;UPPER
ORIENTATION: RIGHT

## 2023-01-04 NOTE — PROGRESS NOTES
Hospitalist Progress Note      PCP: Lyndsey Emerson MD    Date of Admission: 1/2/2023    Chief Complaint: upper back and neck pain radiating to right upper extremity. This is associated with numbness and tingling in the right arm and decreased in the strength of handgrip.       Subjective:   Patient underwent C6-7 translaminar epidural injection of celestone and sensorcaine yesterday  Still with pain     Medications:  Reviewed    Infusion Medications    sodium chloride 50 mL/hr at 01/03/23 0957     Scheduled Medications    lisinopril  5 mg Oral Daily    QUEtiapine  50 mg Oral Nightly    levothyroxine  125 mcg Oral Daily    methocarbamol IVPB  1,000 mg IntraVENous Q8H    gabapentin  100 mg Oral TID    sodium chloride flush  5-40 mL IntraVENous 2 times per day    [Held by provider] enoxaparin  40 mg SubCUTAneous Daily     PRN Meds: diazePAM, clonazePAM, morphine, oxyCODONE, sodium chloride flush, sodium chloride, ondansetron **OR** ondansetron, polyethylene glycol, acetaminophen **OR** acetaminophen      Intake/Output Summary (Last 24 hours) at 1/4/2023 1644  Last data filed at 1/4/2023 0261  Gross per 24 hour   Intake 240 ml   Output --   Net 240 ml       Physical Exam Performed:    /82   Pulse 64   Temp 97.6 °F (36.4 °C) (Oral)   Resp 16   Ht 5' 10\" (1.778 m)   SpO2 95%   BMI 32.42 kg/m²       GEN alert, in no distress  HEENT normocephalic, anicteric sclera, EOMI, mucosa moist, no stridor  NECK supple, trachea midline  RESP on RA, in no distress, clear to auscultation  CARDS RRR, S1, S2, no murmurs, no edema, radial pulse 2+, DP pulse 2+  ABD +BS, soft nontender  MSK non-tender along spine; no cyanosis, no clubbing  SKIN warm, dry  NEURO alert, oriented x 3, no facial asymmetry, no dysarthria, moving spontaneously  PSYCH normal mood        Labs:   Recent Labs     01/02/23  1552 01/03/23  0644   WBC 8.9 14.1*   HGB 16.9 16.5   HCT 50.4 49.0    203     Recent Labs     01/02/23  1552 01/03/23  0644    137   K 3.7 4.1    103   CO2 22 24   BUN 12 17   CREATININE 0.9 0.9   CALCIUM 9.3 9.6     Recent Labs     01/02/23  1552   AST 23   ALT 31   BILITOT 0.4   ALKPHOS 94     Recent Labs     01/03/23  0644   INR 0.99     Recent Labs     01/02/23  1552   TROPONINI <0.01       Urinalysis:      Lab Results   Component Value Date/Time    NITRU Negative 12/07/2021 01:11 AM    WBCUA 8 12/07/2021 01:11 AM    RBCUA 0-2 12/07/2021 01:11 AM    BLOODU Negative 12/07/2021 01:11 AM    SPECGRAV 1.029 12/07/2021 01:11 AM    GLUCOSEU 500 12/07/2021 01:11 AM       Radiology:  IR INJ CERVICAL/THORACIC TRANSFORAMINAL EPIDURAL SINGLE LEVEL   Final Result   Impression:   Successful C6-7 translaminar epidural injection of Celestone and Sensorcaine. Pre-procedure pain score : 8   Post-procedure pain score : 2      Fluoroscopy time : 0.8 minutes   Number of exposures obtained : 6   Blood loss : minimal (less than 5 cc)   Specimens removed : none         MRI CERVICAL SPINE WO CONTRAST   Final Result      1. Multilevel cervical spondylosis with disc bulge and hypertrophic spurring greatest at the C6-C7 level causing moderate central canal stenosis with cord compression. Multilevel severe neuroforaminal narrowing as detailed above. Assessment/Plan:    Active Hospital Problems    Diagnosis Date Noted    Cervical stenosis of spine [M48.02] 01/02/2023     Priority: Medium     The patient is a 50 y.o. male with history of ADHD, anxiety, depression, PTSD, HTN, hypothyroidism, chronic back pain, who presents with several days of severe upper back and neck pain radiating to right upper extremity. This is associated with numbness and tingling in the right arm and decreased in the strength of handgrip. Patient denies any injuries to his back and he has had prior surgeries on both shoulders and he has been very careful and avoiding heavy lifting or strenuous workouts. There is been no car injuries. Patient was seen at outside hospital on 12/30; at that time he underwent evaluation of his right shoulder and was referred for orthopedic outpatient evaluation. ER physician reports that patient was in significant pain, reporting radiating pain up and down his back and his shoulders. Patient had CT chest abdomen pelvis with contrast which ruled out dissection. He required several doses IV pain medications to get comfortable. Imaging of cervical spine revealed osteophytes and canal stenosis. Neurosurgery was consulted and recommended obtaining MRI and transfer to [facility] for further evaluation. Cervical Radiculopathy  - CT Cervical spine wo contrast 1/2/23 showed Posterior osteophyte and possibly a mild degree of broad posterior disc extension C6-T1 resulting in a degree of central canal stenosis. No evidence of fracture with degenerative changes.  - MRI of Cervical Spine wo contrast 1/3/23 showed multilevel cervical spondylosis with disc bulge and hypertrophic spurring greatest at the C6-C7 level causing moderate central canal stenosis with cord compression. Multilevel severe neuroforaminal narrowing.  - Received decadron 10 mg (1/2) in ED.  - Underwent epidural steroid injection on 1/3/22.  - Started gabapentin 100 mg TID, robaxin 100 mg Q8H.  - Neurosurgery recommended patient remain inpatient to monitor symptoms. Hypertension  - Continue home lisinopril 5 mg daily. Hypothyroidism  - Continue home levothyroxine 125 mg daily. - tsh. Anxiety  Depression  PTSD  - Continue seroquel XL 50 mg HS, klonopin 1 mg Q12H PRN. DVT Prophylaxis:   Diet: ADULT DIET;  Regular  Code Status: Full Code    PT/OT Eval Status: PT/OT    Dispo -possible d/c tomorrow, one more day for pain sanjana Alcala MD

## 2023-01-04 NOTE — PROGRESS NOTES
Physical / Occupational Therapy Sign Off    Orders received, chart reviewed. Per chart review and speaking with RN pt is up ad vanessa demonstrating steady gait with no safety concerns for returning home or ambulating alone. No therapy needs demonstrated, will sign off. Please sign off if status changes.      Laila Zhang, PT, DPT   Rita Tinoco, MOT, OTR/L

## 2023-01-04 NOTE — CARE COORDINATION
CM spoke with patient at bedside. He is from home with spouse, independent pta. No CM needs at this time. Family to transport home.     Harman David, RN, BSN,   4th Floor Progressive Care Unit  [phone]

## 2023-01-04 NOTE — PROGRESS NOTES
NEUROSURGERY POST-OP PROGRESS NOTE    Patient Name: Mera Galarza YOB: 1970   Sex: Male Age: 50 yrs     Medical Record Number: [id number] Acct Number: [de-identified]   Room Number: 4425/9293-80 Hospital Day: Hospital Day: 3     Interval hx: pt has cord compression R side C6-7 and presented with R arm and neck pain with N/T. Got an DINO yesterday with some relief. Subjective: Pt states his pain is back like it was before DINO. Objective:    VITAL SIGNS   BP (!) 144/82   Pulse 59   Temp 98.9 °F (37.2 °C) (Oral)   Resp 16   Ht 5' 10\" (1.778 m)   SpO2 97%   BMI 32.42 kg/m²    Height Height: 5' 10\" (177.8 cm)   Weight          Allergies Allergies   Allergen Reactions    Bactrim [Sulfamethoxazole-Trimethoprim] Anaphylaxis    Prednisone       NPO Status ADULT DIET; Regular   Isolation No active isolations     LABS   Basic Metabolic Profile Recent Labs     01/02/23  1552 01/03/23  0644    137    103   CO2 22 24   BUN 12 17   CREATININE 0.9 0.9   GLUCOSE 95 161*      Complete Blood Count Recent Labs     01/02/23  1552 01/03/23  0644   WBC 8.9 14.1*   RBC 5.85 5.69      Coagulation Studies Recent Labs     01/03/23  0644   INR 0.99        MEDICATIONS   Inpatient Medications     lisinopril, 5 mg, Oral, Daily    QUEtiapine, 50 mg, Oral, Nightly    levothyroxine, 125 mcg, Oral, Daily    methocarbamol IVPB, 1,000 mg, IntraVENous, Q8H    gabapentin, 100 mg, Oral, TID    sodium chloride flush, 5-40 mL, IntraVENous, 2 times per day    [Held by provider] enoxaparin, 40 mg, SubCUTAneous, Daily   Infusions    sodium chloride 50 mL/hr at 01/03/23 0957      Antibiotics   Recent Abx Admin        No antibiotic orders with administrations found.                      Neurologic Exam:  Mental status: awake and alert and oriented x4    Musculoskeletal: Gait: Not tested   Tone: normal  Sensory: intact to all extremities  Motor strength:    Right  Left    Right  Left    Deltoid  5 5  Hip Flex  5 5   Biceps  5 5  Knee Extensors  5 5   Triceps  5 5  Knee Flexors  5 5   Wrist Ext  5 5  Ankle Dorsiflex. 5 5   Wrist Flex  5 5  Ankle Plantarflex. 5 5   Handgrip  5 5  Ext Michael Longus  5 5   Thumb Ext  5 5           Respiratory:  Unlabored respiratory pattern    Abdomen:   Soft, ND     Cardiovascular:  Warm, well perfused    Assessment   Patient is a 50 y.o. male w/c6-7 disc  with cord compression on R     Plan:  Neurologic exams frequency: Q4H  Muscle spasms: scheduled Robaxin and Valium  Pain control: chapincito and morphine  One more night to get pain under control  Dispo: poss discharge and follow up tomorrow  Patient was seen with Dr. Raulito Bal who agrees with above assessment and plan. Electronically signed by: SANTA Modi CNP, 1/4/2023 1:41 PM   Neurosurgery Nurse Practitioner  [phone]   I spent 30 minutes in the care of this patient. Over 50% of that time was in face-to-face counseling regarding disease process, diagnostic testing, preventative measures, and answering patient and family questions.

## 2023-01-04 NOTE — PLAN OF CARE
Problem: Discharge Planning  Goal: Discharge to home or other facility with appropriate resources  1/4/2023 0041 by Leonid Gaston RN  Outcome: Progressing  1/3/2023 1619 by Yecenia Jean RN  Outcome: Progressing     Problem: Pain  Goal: Verbalizes/displays adequate comfort level or baseline comfort level  1/4/2023 0041 by Leonid Gaston RN  Outcome: Progressing  1/3/2023 1619 by Yecenia Jean RN  Outcome: Progressing     Problem: Safety - Adult  Goal: Free from fall injury  1/4/2023 0041 by Leonid Gaston RN  Outcome: Progressing  1/3/2023 1619 by Yecenia Jean RN  Outcome: Progressing

## 2023-01-04 NOTE — PLAN OF CARE
Problem: Pain  Goal: Verbalizes/displays adequate comfort level or baseline comfort level  1/4/2023 1109 by Prakash Degroot RN  Outcome: Progressing  Flowsheets (Taken 1/4/2023 1109)  Verbalizes/displays adequate comfort level or baseline comfort level:   Encourage patient to monitor pain and request assistance   Assess pain using appropriate pain scale   Administer analgesics based on type and severity of pain and evaluate response   Implement non-pharmacological measures as appropriate and evaluate response     Problem: Safety - Adult  Goal: Free from fall injury  1/4/2023 1109 by Prakash Degroot RN  Flowsheets (Taken 1/4/2023 1109)  Free From Fall Injury: Instruct family/caregiver on patient safety

## 2023-01-05 LAB
A/G RATIO: 2.4 (ref 1.1–2.2)
ALBUMIN SERPL-MCNC: 3.3 G/DL (ref 3.4–5)
ALP BLD-CCNC: 71 U/L (ref 40–129)
ALT SERPL-CCNC: 23 U/L (ref 10–40)
ANION GAP SERPL CALCULATED.3IONS-SCNC: 13 MMOL/L (ref 3–16)
AST SERPL-CCNC: 14 U/L (ref 15–37)
BASOPHILS ABSOLUTE: 0 K/UL (ref 0–0.2)
BASOPHILS RELATIVE PERCENT: 0.3 %
BILIRUB SERPL-MCNC: <0.2 MG/DL (ref 0–1)
BUN BLDV-MCNC: 19 MG/DL (ref 7–20)
CALCIUM SERPL-MCNC: 7.1 MG/DL (ref 8.3–10.6)
CHLORIDE BLD-SCNC: 111 MMOL/L (ref 99–110)
CO2: 19 MMOL/L (ref 21–32)
CREAT SERPL-MCNC: 0.8 MG/DL (ref 0.9–1.3)
EOSINOPHILS ABSOLUTE: 0 K/UL (ref 0–0.6)
EOSINOPHILS RELATIVE PERCENT: 0.2 %
GFR SERPL CREATININE-BSD FRML MDRD: >60 ML/MIN/{1.73_M2}
GLUCOSE BLD-MCNC: 83 MG/DL (ref 70–99)
HCT VFR BLD CALC: 42.7 % (ref 40.5–52.5)
HEMOGLOBIN: 14.7 G/DL (ref 13.5–17.5)
LYMPHOCYTES ABSOLUTE: 2.3 K/UL (ref 1–5.1)
LYMPHOCYTES RELATIVE PERCENT: 23.9 %
MAGNESIUM: 1.4 MG/DL (ref 1.8–2.4)
MCH RBC QN AUTO: 30 PG (ref 26–34)
MCHC RBC AUTO-ENTMCNC: 34.4 G/DL (ref 31–36)
MCV RBC AUTO: 87.3 FL (ref 80–100)
MONOCYTES ABSOLUTE: 0.8 K/UL (ref 0–1.3)
MONOCYTES RELATIVE PERCENT: 8.2 %
NEUTROPHILS ABSOLUTE: 6.6 K/UL (ref 1.7–7.7)
NEUTROPHILS RELATIVE PERCENT: 67.4 %
PDW BLD-RTO: 13.8 % (ref 12.4–15.4)
PLATELET # BLD: 177 K/UL (ref 135–450)
PMV BLD AUTO: 9.1 FL (ref 5–10.5)
POTASSIUM REFLEX MAGNESIUM: 2.9 MMOL/L (ref 3.5–5.1)
RBC # BLD: 4.89 M/UL (ref 4.2–5.9)
SODIUM BLD-SCNC: 143 MMOL/L (ref 136–145)
TOTAL PROTEIN: 4.7 G/DL (ref 6.4–8.2)
WBC # BLD: 9.7 K/UL (ref 4–11)

## 2023-01-05 PROCEDURE — 36415 COLL VENOUS BLD VENIPUNCTURE: CPT

## 2023-01-05 PROCEDURE — 84132 ASSAY OF SERUM POTASSIUM: CPT

## 2023-01-05 PROCEDURE — 2580000003 HC RX 258: Performed by: INTERNAL MEDICINE

## 2023-01-05 PROCEDURE — 6360000002 HC RX W HCPCS: Performed by: HOSPITALIST

## 2023-01-05 PROCEDURE — 85025 COMPLETE CBC W/AUTO DIFF WBC: CPT

## 2023-01-05 PROCEDURE — 80053 COMPREHEN METABOLIC PANEL: CPT

## 2023-01-05 PROCEDURE — 6370000000 HC RX 637 (ALT 250 FOR IP): Performed by: INTERNAL MEDICINE

## 2023-01-05 PROCEDURE — 83735 ASSAY OF MAGNESIUM: CPT

## 2023-01-05 PROCEDURE — 1200000000 HC SEMI PRIVATE

## 2023-01-05 PROCEDURE — 6360000002 HC RX W HCPCS: Performed by: NURSE PRACTITIONER

## 2023-01-05 PROCEDURE — 6370000000 HC RX 637 (ALT 250 FOR IP): Performed by: NEUROLOGICAL SURGERY

## 2023-01-05 PROCEDURE — 6360000002 HC RX W HCPCS: Performed by: INTERNAL MEDICINE

## 2023-01-05 PROCEDURE — 6370000000 HC RX 637 (ALT 250 FOR IP): Performed by: NURSE PRACTITIONER

## 2023-01-05 PROCEDURE — 99232 SBSQ HOSP IP/OBS MODERATE 35: CPT | Performed by: NURSE PRACTITIONER

## 2023-01-05 RX ORDER — DEXAMETHASONE SODIUM PHOSPHATE 4 MG/ML
10 INJECTION, SOLUTION INTRA-ARTICULAR; INTRALESIONAL; INTRAMUSCULAR; INTRAVENOUS; SOFT TISSUE ONCE
Status: COMPLETED | OUTPATIENT
Start: 2023-01-05 | End: 2023-01-05

## 2023-01-05 RX ORDER — POTASSIUM CHLORIDE 7.45 MG/ML
10 INJECTION INTRAVENOUS PRN
Status: DISCONTINUED | OUTPATIENT
Start: 2023-01-05 | End: 2023-01-08 | Stop reason: HOSPADM

## 2023-01-05 RX ORDER — DEXAMETHASONE SODIUM PHOSPHATE 4 MG/ML
4 INJECTION, SOLUTION INTRA-ARTICULAR; INTRALESIONAL; INTRAMUSCULAR; INTRAVENOUS; SOFT TISSUE EVERY 6 HOURS
Status: DISCONTINUED | OUTPATIENT
Start: 2023-01-05 | End: 2023-01-07

## 2023-01-05 RX ORDER — MAGNESIUM SULFATE IN WATER 40 MG/ML
4000 INJECTION, SOLUTION INTRAVENOUS ONCE
Status: COMPLETED | OUTPATIENT
Start: 2023-01-05 | End: 2023-01-05

## 2023-01-05 RX ADMIN — SODIUM CHLORIDE: 9 INJECTION, SOLUTION INTRAVENOUS at 11:20

## 2023-01-05 RX ADMIN — GABAPENTIN 100 MG: 100 CAPSULE ORAL at 08:51

## 2023-01-05 RX ADMIN — SODIUM CHLORIDE, PRESERVATIVE FREE 10 ML: 5 INJECTION INTRAVENOUS at 08:52

## 2023-01-05 RX ADMIN — QUETIAPINE FUMARATE 50 MG: 25 TABLET ORAL at 21:22

## 2023-01-05 RX ADMIN — DIAZEPAM 5 MG: 5 TABLET ORAL at 19:45

## 2023-01-05 RX ADMIN — POTASSIUM CHLORIDE 10 MEQ: 7.46 INJECTION, SOLUTION INTRAVENOUS at 11:21

## 2023-01-05 RX ADMIN — METHOCARBAMOL 1000 MG: 100 INJECTION, SOLUTION INTRAMUSCULAR; INTRAVENOUS at 04:27

## 2023-01-05 RX ADMIN — POTASSIUM CHLORIDE 10 MEQ: 7.46 INJECTION, SOLUTION INTRAVENOUS at 15:26

## 2023-01-05 RX ADMIN — MAGNESIUM SULFATE HEPTAHYDRATE 4000 MG: 40 INJECTION, SOLUTION INTRAVENOUS at 15:38

## 2023-01-05 RX ADMIN — LISINOPRIL 5 MG: 5 TABLET ORAL at 08:51

## 2023-01-05 RX ADMIN — LEVOTHYROXINE SODIUM 125 MCG: 0.12 TABLET ORAL at 05:06

## 2023-01-05 RX ADMIN — CLONAZEPAM 1 MG: 1 TABLET ORAL at 12:22

## 2023-01-05 RX ADMIN — OXYCODONE 10 MG: 5 TABLET ORAL at 13:07

## 2023-01-05 RX ADMIN — MORPHINE SULFATE 4 MG: 2 INJECTION, SOLUTION INTRAMUSCULAR; INTRAVENOUS at 05:06

## 2023-01-05 RX ADMIN — GABAPENTIN 100 MG: 100 CAPSULE ORAL at 21:22

## 2023-01-05 RX ADMIN — OXYCODONE 10 MG: 5 TABLET ORAL at 02:59

## 2023-01-05 RX ADMIN — OXYCODONE 10 MG: 5 TABLET ORAL at 21:35

## 2023-01-05 RX ADMIN — CLONAZEPAM 1 MG: 1 TABLET ORAL at 22:57

## 2023-01-05 RX ADMIN — POTASSIUM CHLORIDE 10 MEQ: 7.46 INJECTION, SOLUTION INTRAVENOUS at 20:33

## 2023-01-05 RX ADMIN — OXYCODONE 10 MG: 5 TABLET ORAL at 08:51

## 2023-01-05 RX ADMIN — DEXAMETHASONE SODIUM PHOSPHATE 4 MG: 4 INJECTION, SOLUTION INTRAMUSCULAR; INTRAVENOUS at 15:27

## 2023-01-05 RX ADMIN — OXYCODONE 10 MG: 5 TABLET ORAL at 17:36

## 2023-01-05 RX ADMIN — METHOCARBAMOL 1000 MG: 100 INJECTION, SOLUTION INTRAMUSCULAR; INTRAVENOUS at 09:00

## 2023-01-05 RX ADMIN — DIAZEPAM 5 MG: 5 TABLET ORAL at 06:38

## 2023-01-05 RX ADMIN — POTASSIUM CHLORIDE 10 MEQ: 7.46 INJECTION, SOLUTION INTRAVENOUS at 13:12

## 2023-01-05 RX ADMIN — MORPHINE SULFATE 4 MG: 2 INJECTION, SOLUTION INTRAMUSCULAR; INTRAVENOUS at 11:47

## 2023-01-05 RX ADMIN — DEXAMETHASONE SODIUM PHOSPHATE 4 MG: 4 INJECTION, SOLUTION INTRAMUSCULAR; INTRAVENOUS at 21:22

## 2023-01-05 RX ADMIN — POTASSIUM CHLORIDE 10 MEQ: 7.46 INJECTION, SOLUTION INTRAVENOUS at 17:43

## 2023-01-05 RX ADMIN — DEXAMETHASONE SODIUM PHOSPHATE 10 MG: 4 INJECTION, SOLUTION INTRAMUSCULAR; INTRAVENOUS at 08:51

## 2023-01-05 RX ADMIN — GABAPENTIN 100 MG: 100 CAPSULE ORAL at 13:08

## 2023-01-05 ASSESSMENT — PAIN DESCRIPTION - ONSET
ONSET: ON-GOING

## 2023-01-05 ASSESSMENT — PAIN DESCRIPTION - LOCATION
LOCATION: BACK;NECK
LOCATION: BACK;NECK
LOCATION: BACK;NECK;ARM
LOCATION: BACK;ARM
LOCATION: BACK;NECK
LOCATION: BACK
LOCATION: BACK
LOCATION: BACK;ARM

## 2023-01-05 ASSESSMENT — PAIN DESCRIPTION - PAIN TYPE
TYPE: ACUTE PAIN

## 2023-01-05 ASSESSMENT — PAIN DESCRIPTION - ORIENTATION
ORIENTATION: UPPER
ORIENTATION: RIGHT
ORIENTATION: UPPER
ORIENTATION: RIGHT
ORIENTATION: RIGHT

## 2023-01-05 ASSESSMENT — PAIN DESCRIPTION - DESCRIPTORS
DESCRIPTORS: ACHING;BURNING
DESCRIPTORS: ACHING;SHARP
DESCRIPTORS: ACHING;BURNING
DESCRIPTORS: ACHING;SHARP
DESCRIPTORS: ACHING;SHARP
DESCRIPTORS: ACHING
DESCRIPTORS: ACHING;SHARP
DESCRIPTORS: ACHING

## 2023-01-05 ASSESSMENT — PAIN SCALES - GENERAL
PAINLEVEL_OUTOF10: 5
PAINLEVEL_OUTOF10: 4
PAINLEVEL_OUTOF10: 8
PAINLEVEL_OUTOF10: 7
PAINLEVEL_OUTOF10: 0
PAINLEVEL_OUTOF10: 10
PAINLEVEL_OUTOF10: 3
PAINLEVEL_OUTOF10: 6
PAINLEVEL_OUTOF10: 6
PAINLEVEL_OUTOF10: 10

## 2023-01-05 ASSESSMENT — PAIN DESCRIPTION - FREQUENCY
FREQUENCY: CONTINUOUS

## 2023-01-05 ASSESSMENT — PAIN - FUNCTIONAL ASSESSMENT
PAIN_FUNCTIONAL_ASSESSMENT: ACTIVITIES ARE NOT PREVENTED

## 2023-01-05 NOTE — PROGRESS NOTES
This RN received call from patient's wife, Aldo @ 2257. Wife was very frustrated at lack of communication. She was also frustrated that no one has gone over new medications for  and that pt had stated he didn't get any pain medicine overnight. This RN went over all new medications and why they were ordered with wife and went through pain medication that was received every few hours overnight. She wanted to speak with a charge nurse and doctors from every team that is following pt. As the charge nurse today I spoke with wife. I then explained that neuro sx has left for the day, but that this RN is back tomorrow and I will relay the message along with her name and number to both neuro sx and the hospitalist. Wife was okay with that and said she appreciated that.  Will follow up in AM.

## 2023-01-05 NOTE — PROGRESS NOTES
Hospitalist Progress Note      PCP: Cheryl Enriquez MD    Date of Admission: 1/2/2023    Chief Complaint: upper back and neck pain radiating to right upper extremity. This is associated with numbness and tingling in the right arm and decreased in the strength of handgrip.       Subjective:   Patient underwent C6-7 translaminar epidural injection of celestone and sensorcaine yesterday  Still with pain   Requiring iv pain medication  Having sever hypokalemia , start on replacement protocol  Replace serum Mg     Medications:  Reviewed    Infusion Medications    sodium chloride 25 mL/hr at 01/05/23 1120     Scheduled Medications    dexamethasone  4 mg IntraVENous Q6H    magnesium sulfate  4,000 mg IntraVENous Once    lisinopril  5 mg Oral Daily    QUEtiapine  50 mg Oral Nightly    levothyroxine  125 mcg Oral Daily    methocarbamol IVPB  1,000 mg IntraVENous Q8H    gabapentin  100 mg Oral TID    sodium chloride flush  5-40 mL IntraVENous 2 times per day    [Held by provider] enoxaparin  40 mg SubCUTAneous Daily     PRN Meds: potassium chloride, oxyCODONE, diazePAM, clonazePAM, morphine, sodium chloride flush, sodium chloride, ondansetron **OR** ondansetron, polyethylene glycol, acetaminophen **OR** acetaminophen      Intake/Output Summary (Last 24 hours) at 1/5/2023 1208  Last data filed at 1/5/2023 0943  Gross per 24 hour   Intake 740 ml   Output --   Net 740 ml         Physical Exam Performed:    /77   Pulse 53   Temp 97.7 °F (36.5 °C) (Oral)   Resp 16   Ht 5' 10\" (1.778 m)   SpO2 95%   BMI 32.42 kg/m²       GEN alert, in no distress  HEENT normocephalic, anicteric sclera, EOMI, mucosa moist, no stridor  NECK supple, trachea midline  RESP on RA, in no distress, clear to auscultation  CARDS RRR, S1, S2, no murmurs, no edema, radial pulse 2+, DP pulse 2+  ABD +BS, soft nontender  MSK non-tender along spine; no cyanosis, no clubbing  SKIN warm, dry  NEURO alert, oriented x 3, no facial asymmetry, no dysarthria, moving spontaneously  PSYCH normal mood        Labs:   Recent Labs     01/02/23  1552 01/03/23  0644 01/05/23  0930   WBC 8.9 14.1* 9.7   HGB 16.9 16.5 14.7   HCT 50.4 49.0 42.7    203 177       Recent Labs     01/02/23  1552 01/03/23  0644 01/05/23  0930    137 143   K 3.7 4.1 2.9*    103 111*   CO2 22 24 19*   BUN 12 17 19   CREATININE 0.9 0.9 0.8*   CALCIUM 9.3 9.6 7.1*       Recent Labs     01/02/23  1552 01/05/23  0930   AST 23 14*   ALT 31 23   BILITOT 0.4 <0.2   ALKPHOS 94 71       Recent Labs     01/03/23  0644   INR 0.99       Recent Labs     01/02/23  1552   TROPONINI <0.01         Urinalysis:      Lab Results   Component Value Date/Time    NITRU Negative 12/07/2021 01:11 AM    WBCUA 8 12/07/2021 01:11 AM    RBCUA 0-2 12/07/2021 01:11 AM    BLOODU Negative 12/07/2021 01:11 AM    SPECGRAV 1.029 12/07/2021 01:11 AM    GLUCOSEU 500 12/07/2021 01:11 AM       Radiology:  IR INJ CERVICAL/THORACIC TRANSFORAMINAL EPIDURAL SINGLE LEVEL   Final Result   Impression:   Successful C6-7 translaminar epidural injection of Celestone and Sensorcaine. Pre-procedure pain score : 8   Post-procedure pain score : 2      Fluoroscopy time : 0.8 minutes   Number of exposures obtained : 6   Blood loss : minimal (less than 5 cc)   Specimens removed : none         MRI CERVICAL SPINE WO CONTRAST   Final Result      1. Multilevel cervical spondylosis with disc bulge and hypertrophic spurring greatest at the C6-C7 level causing moderate central canal stenosis with cord compression. Multilevel severe neuroforaminal narrowing as detailed above.                  Assessment/Plan:    Active Hospital Problems    Diagnosis Date Noted    Cervical stenosis of spine [M48.02] 01/02/2023     Priority: Medium     The patient is a 50 y.o. male with history of ADHD, anxiety, depression, PTSD, HTN, hypothyroidism, chronic back pain, who presents with several days of severe upper back and neck pain radiating to right upper extremity. This is associated with numbness and tingling in the right arm and decreased in the strength of handgrip. Patient denies any injuries to his back and he has had prior surgeries on both shoulders and he has been very careful and avoiding heavy lifting or strenuous workouts. There is been no car injuries. Patient was seen at outside hospital on 12/30; at that time he underwent evaluation of his right shoulder and was referred for orthopedic outpatient evaluation. ER physician reports that patient was in significant pain, reporting radiating pain up and down his back and his shoulders. Patient had CT chest abdomen pelvis with contrast which ruled out dissection. He required several doses IV pain medications to get comfortable. Imaging of cervical spine revealed osteophytes and canal stenosis. Neurosurgery was consulted and recommended obtaining MRI and transfer to [facility] for further evaluation. Cervical Radiculopathy  - CT Cervical spine wo contrast 1/2/23 showed Posterior osteophyte and possibly a mild degree of broad posterior disc extension C6-T1 resulting in a degree of central canal stenosis. No evidence of fracture with degenerative changes.  - MRI of Cervical Spine wo contrast 1/3/23 showed multilevel cervical spondylosis with disc bulge and hypertrophic spurring greatest at the C6-C7 level causing moderate central canal stenosis with cord compression. Multilevel severe neuroforaminal narrowing.  - Received decadron 10 mg (1/2) in ED.  - Underwent epidural steroid injection on 1/3/22.  - Started gabapentin 100 mg TID, robaxin 100 mg Q8H.  - Neurosurgery recommended patient remain inpatient to monitor symptoms. Still having sever pain requiring iv pain medication    Hypokalemia and hypomagnesemia : on replacement protocol      Hypertension  - Continue home lisinopril 5 mg daily. Hypothyroidism  - Continue home levothyroxine 125 mg daily.     - tsh.    Anxiety  Depression  PTSD  - Continue seroquel XL 50 mg HS, klonopin 1 mg Q12H PRN. DVT Prophylaxis:   Diet: ADULT DIET;  Regular  Code Status: Full Code    PT/OT Eval Status: PT/OT    Dispo -possible d/c tomorrow, one more day for pain control and replacing electrolytes     Tiera De Luna MD

## 2023-01-05 NOTE — PROGRESS NOTES
Pt is A/O x4. VSS. Pain is better controlled. Up independently. Voiding adequately. Will continue to monitor.

## 2023-01-05 NOTE — PLAN OF CARE
Problem: Discharge Planning  Goal: Discharge to home or other facility with appropriate resources  1/5/2023 0733 by Julieta Hanna RN  Outcome: Progressing  1/5/2023 0526 by Julieta Hanna RN  Outcome: Progressing     Problem: Pain  Goal: Verbalizes/displays adequate comfort level or baseline comfort level  1/5/2023 0733 by Julieta Hanna RN  Outcome: Progressing  1/5/2023 0526 by Julieta Hanna RN  Outcome: Progressing     Problem: Safety - Adult  Goal: Free from fall injury  1/5/2023 0733 by Julieta Hanna RN  Outcome: Progressing  1/5/2023 0526 by Julieta Hanna RN  Outcome: Progressing     Problem: ABCDS Injury Assessment  Goal: Absence of physical injury  1/5/2023 0733 by Julieta Hanna RN  Outcome: Progressing  1/5/2023 0526 by Julieta Hanna RN  Outcome: Progressing

## 2023-01-05 NOTE — PROGRESS NOTES
Patient Alert and oriented X 4. VSS with an exception of elevated BP. No change in neuro assessment. Pain is managed per MAR. Patient slept most of the night. Tolerating fluid and diet. All fall precaution in place. Will continue to monitor.

## 2023-01-05 NOTE — PROGRESS NOTES
NEUROSURGERY PROGRESS NOTE    Patient Name: Hernesto Roman YOB: 1970   Sex: Male Age: 50 yrs     Medical Record Number: [id number] Acct Number: [de-identified]   Room Number: 5049/6728-29 Hospital Day: Hospital Day: 4     Interval hx: pt has cord compression R side C6-7 and presented with R arm and neck pain with N/T. Got an DINO yesterday with some relief. Subjective: He states that his arm pain is terrible again. Objective:    VITAL SIGNS   /65   Pulse 50   Temp 97.3 °F (36.3 °C) (Oral)   Resp 18   Ht 5' 10\" (1.778 m)   SpO2 96%   BMI 32.42 kg/m²    Height Height: 5' 10\" (177.8 cm)   Weight          Allergies Allergies   Allergen Reactions    Bactrim [Sulfamethoxazole-Trimethoprim] Anaphylaxis    Prednisone       NPO Status ADULT DIET; Regular   Isolation No active isolations     LABS   Basic Metabolic Profile Recent Labs     01/02/23  1552 01/03/23  0644    137    103   CO2 22 24   BUN 12 17   CREATININE 0.9 0.9   GLUCOSE 95 161*      Complete Blood Count Recent Labs     01/02/23  1552 01/03/23  0644   WBC 8.9 14.1*   RBC 5.85 5.69      Coagulation Studies Recent Labs     01/03/23  0644   INR 0.99        MEDICATIONS   Inpatient Medications     dexamethasone, 10 mg, IntraVENous, Once **FOLLOWED BY** dexamethasone, 4 mg, IntraVENous, Q6H    lisinopril, 5 mg, Oral, Daily    QUEtiapine, 50 mg, Oral, Nightly    levothyroxine, 125 mcg, Oral, Daily    methocarbamol IVPB, 1,000 mg, IntraVENous, Q8H    gabapentin, 100 mg, Oral, TID    sodium chloride flush, 5-40 mL, IntraVENous, 2 times per day    [Held by provider] enoxaparin, 40 mg, SubCUTAneous, Daily   Infusions    sodium chloride 50 mL/hr at 01/03/23 0957      Antibiotics   Recent Abx Admin        No antibiotic orders with administrations found. Neurologic Exam:  Mental status: awake and alert and oriented x4    Musculoskeletal:   Gait: Not tested   Tone: normal  Sensory: intact to all extremities  Motor strength:    Right  Left    Right  Left    Deltoid  5 5  Hip Flex  5 5   Biceps  5 5  Knee Extensors  5 5   Triceps  5 5  Knee Flexors  5 5   Wrist Ext  5 5  Ankle Dorsiflex. 5 5   Wrist Flex  5 5  Ankle Plantarflex. 5 5   Handgrip  5 5  Ext Michael Longus  5 5   Thumb Ext  5 5           Respiratory:  Unlabored respiratory pattern    Abdomen:   Soft, ND     Cardiovascular:  Warm, well perfused    Assessment   Patient is a 50 y.o. male w/c6-7 disc  with cord compression on R     Plan:  Neurologic exams frequency: Q4H  Muscle spasms: scheduled Robaxin and Valium  Pain control: chapincito and morphine  Steroids started and protonix  Dispo: poss discharge tomorrow and follow up   Patient was seen with Dr. Evelin Lr who agrees with above assessment and plan. Electronically signed by: SANTA Yang CNP, 1/5/2023 8:46 AM   Neurosurgery Nurse Practitioner  [phone]   I spent 30 minutes in the care of this patient. Over 50% of that time was in face-to-face counseling regarding disease process, diagnostic testing, preventative measures, and answering patient and family questions.

## 2023-01-06 LAB
A/G RATIO: 2 (ref 1.1–2.2)
ALBUMIN SERPL-MCNC: 4.3 G/DL (ref 3.4–5)
ALP BLD-CCNC: 96 U/L (ref 40–129)
ALT SERPL-CCNC: 30 U/L (ref 10–40)
ANION GAP SERPL CALCULATED.3IONS-SCNC: 14 MMOL/L (ref 3–16)
AST SERPL-CCNC: 15 U/L (ref 15–37)
BASOPHILS ABSOLUTE: 0 K/UL (ref 0–0.2)
BASOPHILS RELATIVE PERCENT: 0 %
BILIRUB SERPL-MCNC: 0.4 MG/DL (ref 0–1)
BUN BLDV-MCNC: 16 MG/DL (ref 7–20)
CALCIUM SERPL-MCNC: 9.8 MG/DL (ref 8.3–10.6)
CHLORIDE BLD-SCNC: 101 MMOL/L (ref 99–110)
CO2: 24 MMOL/L (ref 21–32)
CREAT SERPL-MCNC: 0.9 MG/DL (ref 0.9–1.3)
EOSINOPHILS ABSOLUTE: 0 K/UL (ref 0–0.6)
EOSINOPHILS RELATIVE PERCENT: 0 %
GFR SERPL CREATININE-BSD FRML MDRD: >60 ML/MIN/{1.73_M2}
GLUCOSE BLD-MCNC: 154 MG/DL (ref 70–99)
HCT VFR BLD CALC: 48.9 % (ref 40.5–52.5)
HEMOGLOBIN: 16.8 G/DL (ref 13.5–17.5)
LYMPHOCYTES ABSOLUTE: 2.2 K/UL (ref 1–5.1)
LYMPHOCYTES RELATIVE PERCENT: 12.8 %
MAGNESIUM: 2.2 MG/DL (ref 1.8–2.4)
MCH RBC QN AUTO: 29.5 PG (ref 26–34)
MCHC RBC AUTO-ENTMCNC: 34.3 G/DL (ref 31–36)
MCV RBC AUTO: 86.2 FL (ref 80–100)
MONOCYTES ABSOLUTE: 1 K/UL (ref 0–1.3)
MONOCYTES RELATIVE PERCENT: 5.5 %
NEUTROPHILS ABSOLUTE: 14.1 K/UL (ref 1.7–7.7)
NEUTROPHILS RELATIVE PERCENT: 81.7 %
PDW BLD-RTO: 13.5 % (ref 12.4–15.4)
PLATELET # BLD: 221 K/UL (ref 135–450)
PMV BLD AUTO: 9.1 FL (ref 5–10.5)
POTASSIUM REFLEX MAGNESIUM: 4.3 MMOL/L (ref 3.5–5.1)
POTASSIUM SERPL-SCNC: 4.2 MMOL/L (ref 3.5–5.1)
RBC # BLD: 5.68 M/UL (ref 4.2–5.9)
SODIUM BLD-SCNC: 139 MMOL/L (ref 136–145)
TOTAL PROTEIN: 6.4 G/DL (ref 6.4–8.2)
WBC # BLD: 17.3 K/UL (ref 4–11)

## 2023-01-06 PROCEDURE — 99231 SBSQ HOSP IP/OBS SF/LOW 25: CPT | Performed by: NURSE PRACTITIONER

## 2023-01-06 PROCEDURE — 1200000000 HC SEMI PRIVATE

## 2023-01-06 PROCEDURE — 6370000000 HC RX 637 (ALT 250 FOR IP): Performed by: NURSE PRACTITIONER

## 2023-01-06 PROCEDURE — 80053 COMPREHEN METABOLIC PANEL: CPT

## 2023-01-06 PROCEDURE — 6370000000 HC RX 637 (ALT 250 FOR IP): Performed by: INTERNAL MEDICINE

## 2023-01-06 PROCEDURE — 2580000003 HC RX 258: Performed by: NURSE PRACTITIONER

## 2023-01-06 PROCEDURE — 6360000002 HC RX W HCPCS: Performed by: INTERNAL MEDICINE

## 2023-01-06 PROCEDURE — 6360000002 HC RX W HCPCS: Performed by: NURSE PRACTITIONER

## 2023-01-06 PROCEDURE — 83735 ASSAY OF MAGNESIUM: CPT

## 2023-01-06 PROCEDURE — 2580000003 HC RX 258: Performed by: INTERNAL MEDICINE

## 2023-01-06 PROCEDURE — 36415 COLL VENOUS BLD VENIPUNCTURE: CPT

## 2023-01-06 PROCEDURE — 6370000000 HC RX 637 (ALT 250 FOR IP): Performed by: NEUROLOGICAL SURGERY

## 2023-01-06 PROCEDURE — 85025 COMPLETE CBC W/AUTO DIFF WBC: CPT

## 2023-01-06 RX ORDER — KETOROLAC TROMETHAMINE 30 MG/ML
30 INJECTION, SOLUTION INTRAMUSCULAR; INTRAVENOUS EVERY 6 HOURS PRN
Status: DISCONTINUED | OUTPATIENT
Start: 2023-01-06 | End: 2023-01-08 | Stop reason: HOSPADM

## 2023-01-06 RX ORDER — METHYLPREDNISOLONE 4 MG/1
TABLET ORAL
Qty: 1 KIT | Refills: 0 | Status: SHIPPED | OUTPATIENT
Start: 2023-01-06 | End: 2023-01-07 | Stop reason: HOSPADM

## 2023-01-06 RX ORDER — LIDOCAINE 4 G/G
1 PATCH TOPICAL DAILY
Qty: 10 EACH | Refills: 0 | Status: SHIPPED | OUTPATIENT
Start: 2023-01-06 | End: 2023-01-07 | Stop reason: SDUPTHER

## 2023-01-06 RX ORDER — TIZANIDINE 4 MG/1
4 TABLET ORAL EVERY 6 HOURS PRN
Qty: 40 TABLET | Refills: 0 | Status: SHIPPED | OUTPATIENT
Start: 2023-01-06 | End: 2023-01-07 | Stop reason: SDUPTHER

## 2023-01-06 RX ORDER — KETOROLAC TROMETHAMINE 30 MG/ML
30 INJECTION, SOLUTION INTRAMUSCULAR; INTRAVENOUS ONCE
Status: COMPLETED | OUTPATIENT
Start: 2023-01-06 | End: 2023-01-06

## 2023-01-06 RX ORDER — METHOCARBAMOL 750 MG/1
750 TABLET, FILM COATED ORAL EVERY 6 HOURS SCHEDULED
Status: DISCONTINUED | OUTPATIENT
Start: 2023-01-06 | End: 2023-01-08 | Stop reason: HOSPADM

## 2023-01-06 RX ORDER — TIZANIDINE 4 MG/1
4 TABLET ORAL EVERY 6 HOURS PRN
Status: DISCONTINUED | OUTPATIENT
Start: 2023-01-06 | End: 2023-01-08

## 2023-01-06 RX ORDER — METHOCARBAMOL 750 MG/1
750 TABLET, FILM COATED ORAL EVERY 6 HOURS
Qty: 40 TABLET | Refills: 0 | Status: SHIPPED | OUTPATIENT
Start: 2023-01-06 | End: 2023-01-07 | Stop reason: SDUPTHER

## 2023-01-06 RX ORDER — KETOROLAC TROMETHAMINE 30 MG/ML
30 INJECTION, SOLUTION INTRAMUSCULAR; INTRAVENOUS EVERY 6 HOURS PRN
Status: DISCONTINUED | OUTPATIENT
Start: 2023-01-06 | End: 2023-01-06

## 2023-01-06 RX ADMIN — SODIUM CHLORIDE, PRESERVATIVE FREE 10 ML: 5 INJECTION INTRAVENOUS at 08:54

## 2023-01-06 RX ADMIN — OXYCODONE 10 MG: 5 TABLET ORAL at 07:10

## 2023-01-06 RX ADMIN — OXYCODONE 10 MG: 5 TABLET ORAL at 23:10

## 2023-01-06 RX ADMIN — METHOCARBAMOL 1000 MG: 100 INJECTION, SOLUTION INTRAMUSCULAR; INTRAVENOUS at 11:37

## 2023-01-06 RX ADMIN — DEXAMETHASONE SODIUM PHOSPHATE 4 MG: 4 INJECTION, SOLUTION INTRAMUSCULAR; INTRAVENOUS at 03:33

## 2023-01-06 RX ADMIN — DEXAMETHASONE SODIUM PHOSPHATE 4 MG: 4 INJECTION, SOLUTION INTRAMUSCULAR; INTRAVENOUS at 08:52

## 2023-01-06 RX ADMIN — QUETIAPINE FUMARATE 50 MG: 25 TABLET ORAL at 21:04

## 2023-01-06 RX ADMIN — GABAPENTIN 100 MG: 100 CAPSULE ORAL at 08:53

## 2023-01-06 RX ADMIN — DEXAMETHASONE SODIUM PHOSPHATE 4 MG: 4 INJECTION, SOLUTION INTRAMUSCULAR; INTRAVENOUS at 13:35

## 2023-01-06 RX ADMIN — CLONAZEPAM 1 MG: 1 TABLET ORAL at 14:22

## 2023-01-06 RX ADMIN — GABAPENTIN 100 MG: 100 CAPSULE ORAL at 21:04

## 2023-01-06 RX ADMIN — TIZANIDINE 4 MG: 4 TABLET ORAL at 21:04

## 2023-01-06 RX ADMIN — DIAZEPAM 5 MG: 5 TABLET ORAL at 06:08

## 2023-01-06 RX ADMIN — LEVOTHYROXINE SODIUM 125 MCG: 0.12 TABLET ORAL at 06:08

## 2023-01-06 RX ADMIN — METHOCARBAMOL TABLETS 750 MG: 750 TABLET, COATED ORAL at 23:10

## 2023-01-06 RX ADMIN — OXYCODONE 10 MG: 5 TABLET ORAL at 03:32

## 2023-01-06 RX ADMIN — POLYETHYLENE GLYCOL 3350 17 G: 17 POWDER, FOR SOLUTION ORAL at 21:04

## 2023-01-06 RX ADMIN — OXYCODONE 10 MG: 5 TABLET ORAL at 11:32

## 2023-01-06 RX ADMIN — SODIUM CHLORIDE, PRESERVATIVE FREE 10 ML: 5 INJECTION INTRAVENOUS at 21:09

## 2023-01-06 RX ADMIN — METHOCARBAMOL TABLETS 750 MG: 750 TABLET, COATED ORAL at 19:39

## 2023-01-06 RX ADMIN — LISINOPRIL 5 MG: 5 TABLET ORAL at 08:53

## 2023-01-06 RX ADMIN — GABAPENTIN 100 MG: 100 CAPSULE ORAL at 13:35

## 2023-01-06 RX ADMIN — KETOROLAC TROMETHAMINE 30 MG: 30 INJECTION, SOLUTION INTRAMUSCULAR; INTRAVENOUS at 13:33

## 2023-01-06 RX ADMIN — OXYCODONE 10 MG: 5 TABLET ORAL at 16:57

## 2023-01-06 RX ADMIN — ONDANSETRON 4 MG: 2 INJECTION INTRAMUSCULAR; INTRAVENOUS at 08:52

## 2023-01-06 RX ADMIN — DEXAMETHASONE SODIUM PHOSPHATE 4 MG: 4 INJECTION, SOLUTION INTRAMUSCULAR; INTRAVENOUS at 21:04

## 2023-01-06 RX ADMIN — TIZANIDINE 4 MG: 4 TABLET ORAL at 08:53

## 2023-01-06 ASSESSMENT — PAIN DESCRIPTION - LOCATION
LOCATION: ARM;BACK
LOCATION: BACK
LOCATION: BACK;ARM
LOCATION: BACK
LOCATION: BACK;ARM
LOCATION: ARM;BACK
LOCATION: SHOULDER
LOCATION: NECK;ARM
LOCATION: BACK
LOCATION: ARM;BACK
LOCATION: BACK
LOCATION: ARM;BACK
LOCATION: ARM

## 2023-01-06 ASSESSMENT — PAIN DESCRIPTION - ORIENTATION
ORIENTATION: UPPER
ORIENTATION: RIGHT
ORIENTATION: UPPER
ORIENTATION: RIGHT
ORIENTATION: UPPER
ORIENTATION: UPPER;RIGHT
ORIENTATION: UPPER

## 2023-01-06 ASSESSMENT — PAIN DESCRIPTION - DESCRIPTORS
DESCRIPTORS: ACHING;BURNING
DESCRIPTORS: ACHING
DESCRIPTORS: ACHING;BURNING
DESCRIPTORS: ACHING
DESCRIPTORS: ACHING;BURNING
DESCRIPTORS: ACHING
DESCRIPTORS: ACHING;BURNING
DESCRIPTORS: ACHING;BURNING
DESCRIPTORS: ACHING
DESCRIPTORS: ACHING;BURNING
DESCRIPTORS: ACHING
DESCRIPTORS: ACHING;BURNING
DESCRIPTORS: ACHING;BURNING

## 2023-01-06 ASSESSMENT — PAIN SCALES - GENERAL
PAINLEVEL_OUTOF10: 5
PAINLEVEL_OUTOF10: 5
PAINLEVEL_OUTOF10: 4
PAINLEVEL_OUTOF10: 8
PAINLEVEL_OUTOF10: 7
PAINLEVEL_OUTOF10: 8
PAINLEVEL_OUTOF10: 8
PAINLEVEL_OUTOF10: 4
PAINLEVEL_OUTOF10: 8
PAINLEVEL_OUTOF10: 5
PAINLEVEL_OUTOF10: 4
PAINLEVEL_OUTOF10: 7
PAINLEVEL_OUTOF10: 5

## 2023-01-06 ASSESSMENT — PAIN DESCRIPTION - PAIN TYPE
TYPE: ACUTE PAIN

## 2023-01-06 ASSESSMENT — PAIN - FUNCTIONAL ASSESSMENT

## 2023-01-06 ASSESSMENT — PAIN DESCRIPTION - ONSET
ONSET: ON-GOING

## 2023-01-06 ASSESSMENT — PAIN DESCRIPTION - FREQUENCY
FREQUENCY: CONTINUOUS

## 2023-01-06 NOTE — DISCHARGE SUMMARY
Discharge Summary    Name:  Trina Wise /Age/Sex: 1970  (50 y.o. male)   MRN & CSN:  [id number] & [id number] Admission Date/Time: 2023 10:23 PM   Attending:  Hillary Hamman, MD Discharging Physician: Hillary Hamman, MD     Hospital Course:   Trina Wise is a 50 y.o.  male  who presents with Cervical stenosis of spine    The patient is a 50 y.o. male with history of ADHD, anxiety, depression, PTSD, HTN, hypothyroidism, chronic back pain, who presents with several days of severe upper back and neck pain radiating to right upper extremity. This is associated with numbness and tingling in the right arm and decreased in the strength of handgrip. Patient denies any injuries to his back and he has had prior surgeries on both shoulders and he has been very careful and avoiding heavy lifting or strenuous workouts. There is been no car injuries. Patient was seen at outside hospital on ; at that time he underwent evaluation of his right shoulder and was referred for orthopedic outpatient evaluation. ER physician reports that patient was in significant pain, reporting radiating pain up and down his back and his shoulders. Patient had CT chest abdomen pelvis with contrast which ruled out dissection. He required several doses IV pain medications to get comfortable. Imaging of cervical spine revealed osteophytes and canal stenosis. Neurosurgery was consulted and recommended obtaining MRI and transfer to [facility]. for further evaluation. Cervical Radiculopathy  - CT Cervical spine wo contrast 23 showed Posterior osteophyte and possibly a mild degree of broad posterior disc extension C6-T1 resulting in a degree of central canal stenosis.   No evidence of fracture with degenerative changes.  - MRI of Cervical Spine wo contrast 1/3/23 showed multilevel cervical spondylosis with disc bulge and hypertrophic spurring greatest at the C6-C7 level causing moderate central canal stenosis with cord compression. Multilevel severe neuroforaminal narrowing.  - Received decadron 10 mg (1/2) in ED.  - Underwent epidural steroid injection on 1/3/22.  - Started gabapentin 100 mg TID, robaxin 100 mg Q8H. - pain is better  D/c on muscle relaxant and taper dose steroid  Lidocaine patch for pain   I do not feel comfortable prescribe oxycodone   He is on benzo and at risk of addiction   I offer him less potent narcotic like tramadol with non opiate pain medication but patient refuse   Concerning behavior  I review OIARS with pharmacy and last time he was on oxycodone in June 2021 then his doc stop prescribing it for not clear reason   Discuss with neurosurgery to have option of surgery     Leukocytosis likley due to steroid   No fever   Denies dysuria, cough , no change in BM  Check CBC on 1/10       Hypokalemia and hypomagnesemia : replaced         Hypertension  - Continue home lisinopril 5 mg daily. Hypothyroidism  - Continue home levothyroxine 125 mg daily. - tsh. Anxiety  Depression  PTSD  - Continue seroquel XL 50 mg HS, klonopin 1 mg Q12H PRN    The patient expressed appropriate understanding of and agreement with the discharge recommendations, medications, and plan. Consults this admission:  IP CONSULT TO NEUROSURGERY    Discharge Instruction:   Follow up appointments:   Primary care physician:  within 2 weeks    Diet:  regular diet   Activity: activity as tolerated  Disposition: Discharged to:   [x]Home, [][facility], []SNF, []Acute Rehab, []Hospice   Condition on discharge: Stable    Discharge Medications:        Medication List        START taking these medications      acetaminophen 500 MG tablet  Commonly known as: TYLENOL     methocarbamol 750 MG tablet  Commonly known as: ROBAXIN  Take 1 tablet by mouth in the morning and 1 tablet at noon and 1 tablet in the evening and 1 tablet before bedtime. Do all this for 10 days.      methylPREDNISolone 4 MG tablet  Commonly known as: MEDROL DOSEPACK  Take by mouth. Take as directed     tiZANidine 4 MG tablet  Commonly known as: ZANAFLEX  Take 1 tablet by mouth every 6 hours as needed (muscle spasms)            CONTINUE taking these medications      clonazePAM 1 MG tablet  Commonly known as: KLONOPIN     levothyroxine 125 MCG tablet  Commonly known as: SYNTHROID     lisinopril 5 MG tablet  Commonly known as: PRINIVIL;ZESTRIL  Take 1 tablet by mouth daily     QUEtiapine 50 MG extended release tablet  Commonly known as: SEROQUEL XR            STOP taking these medications      fluticasone 50 MCG/ACT nasal spray  Commonly known as: FLONASE               Where to Get Your Medications        These medications were sent to [place], [address] [facility] [phone] - F [phone]  [address] [place] [address]      Phone: [phone]   methocarbamol 750 MG tablet  tiZANidine 4 MG tablet       You can get these medications from any pharmacy    Bring a paper prescription for each of these medications  methylPREDNISolone 4 MG tablet         Objective Findings at Discharge:   BP (!) 140/72   Pulse 80   Temp 98 °F (36.7 °C) (Oral)   Resp 16   Ht 5' 10\" (1.778 m)   SpO2 98%   BMI 32.42 kg/m²            PHYSICAL EXAM   GEN Awake.  Alert , not in respiratory distress, not in pain  HEENT: PEERLA, , supple neck,   Chest: air entry equal bilaterally, no wheezing or crepitation  Heart: S1 and S2 heard, no murmur, no gallop or rub, regular rate  Abdomen: soft, ND , Nt, +BS  Extremities: no cyanosis, tenderness or erythema, peripheral pulses audible  Neurology: alert, oriented x3, able to move 4 limbs    BMP/CBC  Recent Labs     01/05/23  0930 01/05/23  2337 01/06/23  0559     --  139   K 2.9* 4.2 4.3   *  --  101   CO2 19*  --  24   BUN 19  --  16   CREATININE 0.8*  --  0.9   WBC 9.7  --  17.3*   HCT 42.7  --  48.9     --  221       IMAGING:      Discharge Time of 35 minutes    Electronically signed by Everardo Luu MD on 1/6/2023 at 10:55 AM

## 2023-01-06 NOTE — PROGRESS NOTES
Patient alert and oriented X 4. VSS. Tolerating fluids and diet. Taking medication www. [employer]. . Pain is managed per MAR. Patient slept most of the night. This RN had multiple attempt to update wife but was unsuccessful. Skin CDI. All fall precaution in place. Will continue to monitor.

## 2023-01-06 NOTE — PLAN OF CARE
Problem: Discharge Planning  Goal: Discharge to home or other facility with appropriate resources  1/5/2023 1957 by Deepali Boggs RN  Outcome: Progressing  Flowsheets (Taken 1/5/2023 1957)  Discharge to home or other facility with appropriate resources:   Identify barriers to discharge with patient and caregiver   Arrange for needed discharge resources and transportation as appropriate   Identify discharge learning needs (meds, wound care, etc)   Arrange for interpreters to assist at discharge as needed     Problem: Pain  Goal: Verbalizes/displays adequate comfort level or baseline comfort level  1/5/2023 1957 by Deepali Boggs RN  Outcome: Progressing  Flowsheets (Taken 1/5/2023 1957)  Verbalizes/displays adequate comfort level or baseline comfort level:   Encourage patient to monitor pain and request assistance   Assess pain using appropriate pain scale   Administer analgesics based on type and severity of pain and evaluate response   Implement non-pharmacological measures as appropriate and evaluate response

## 2023-01-06 NOTE — PROGRESS NOTES
NEUROSURGERY PROGRESS NOTE    Patient Name: Lieutenant Rice YOB: 1970   Sex: Male Age: 50 yrs     Medical Record Number: [id number] Acct Number: [de-identified]   Room Number: 3276/5265-85 Hospital Day: Hospital Day: 5       Subjective: Interval hx: pt has cord compression R side C6-7 and presented with R arm and neck pain with N/T. Got an DINO      Objective:arm pain is better today, still there but better. Dr. Silvana Uriarte did talk with pt and wife about plan. VITAL SIGNS   BP (!) 140/72   Pulse 80   Temp 98 °F (36.7 °C) (Oral)   Resp 16   Ht 5' 10\" (1.778 m)   SpO2 98%   BMI 32.42 kg/m²    Height Height: 5' 10\" (177.8 cm)   Weight          Allergies Allergies   Allergen Reactions    Bactrim [Sulfamethoxazole-Trimethoprim] Anaphylaxis    Prednisone       NPO Status ADULT DIET; Regular   Isolation No active isolations     LABS   Basic Metabolic Profile Recent Labs     01/05/23  0930 01/06/23  0559    139   * 101   CO2 19* 24   BUN 19 16   CREATININE 0.8* 0.9   GLUCOSE 83 154*   MG 1.40* 2.20      Complete Blood Count Recent Labs     01/05/23  0930 01/06/23  0559   WBC 9.7 17.3*   RBC 4.89 5.68      Coagulation Studies No results for input(s): PTT, INR in the last 72 hours.     Invalid input(s): PLATELETS, PROA, PT, PTTA     MEDICATIONS   Inpatient Medications     [COMPLETED] dexamethasone, 10 mg, IntraVENous, Once **FOLLOWED BY** dexamethasone, 4 mg, IntraVENous, Q6H    lisinopril, 5 mg, Oral, Daily    QUEtiapine, 50 mg, Oral, Nightly    levothyroxine, 125 mcg, Oral, Daily    gabapentin, 100 mg, Oral, TID    sodium chloride flush, 5-40 mL, IntraVENous, 2 times per day    [Held by provider] enoxaparin, 40 mg, SubCUTAneous, Daily   Infusions    sodium chloride 25 mL/hr at 01/05/23 1120      Antibiotics   Recent Abx Admin        No antibiotic orders with administrations found. Neurologic Exam:  Mental status: awake and alert and oriented x4    Musculoskeletal:   Gait: Not tested   Tone: normal  Sensory: intact to all extremities  Motor strength:    Right  Left    Right  Left    Deltoid  5 5  Hip Flex  5 5   Biceps  5 5  Knee Extensors  5 5   Triceps  5 5  Knee Flexors  5 5   Wrist Ext  5 5  Ankle Dorsiflex. 5 5   Wrist Flex  5 5  Ankle Plantarflex. 5 5   Handgrip  5 5  Ext Michael Longus  5 5   Thumb Ext  5 5         Respiratory:  Unlabored respiratory pattern    Abdomen:   Soft, ND     Cardiovascular:  Warm, well perfused    Assessment   Patient is a 50 y.o. male w/c6-7 disc  with cord compression on R, he got an DINO with some relief. He will need to follow up as an outpt. He has an appt. Plan:  Neurologic exams frequency: Q4H  Muscle spasms: scheduled Robaxin and xanaflx prn  Pain control: chapincito per medicine team  Steroids started and protonix, added tordol since pt is not having immediate surgery  We will sign off at this time. Pt is to follow up on 1/17 with Radha. Dispo: dispo per medical team    Patient was seen with Dr. Earline Cabot who agrees with above assessment and plan. Electronically signed by: SANTA Leyva CNP, 1/6/2023 9:50 AM   Neurosurgery Nurse Practitioner  [phone]   I spent 30 minutes in the care of this patient. Over 50% of that time was in face-to-face counseling regarding disease process, diagnostic testing, preventative measures, and answering patient and family questions.

## 2023-01-06 NOTE — PROGRESS NOTES
Pt is A/O x4. VSS. Pt reports pain got down to a 4 today. I explained to pt that if his pain is better then he should try to stick to only oral medications. Pt claims he is unable to get any sleep but this RN woke pt up from deep sleep multiple times this shift. Ambulating independently in room with no issues. Pt c/o intermittent arm pain but is resting comfortably most of shift. Will continue to monitor.

## 2023-01-06 NOTE — PLAN OF CARE
Problem: Discharge Planning  Goal: Discharge to home or other facility with appropriate resources  1/6/2023 1849 by Sol Estrella RN  Outcome: Progressing  Flowsheets (Taken 1/6/2023 1849)  Discharge to home or other facility with appropriate resources:   Identify barriers to discharge with patient and caregiver   Arrange for needed discharge resources and transportation as appropriate   Identify discharge learning needs (meds, wound care, etc)     Problem: Pain  Goal: Verbalizes/displays adequate comfort level or baseline comfort level  1/6/2023 1849 by Sol Estrella RN  Outcome: Progressing  Flowsheets (Taken 1/6/2023 1849)  Verbalizes/displays adequate comfort level or baseline comfort level:   Encourage patient to monitor pain and request assistance   Assess pain using appropriate pain scale   Administer analgesics based on type and severity of pain and evaluate response   Implement non-pharmacological measures as appropriate and evaluate response

## 2023-01-06 NOTE — PROGRESS NOTES
Discharge order discontinued. Pt expecting to be d/c on oxycodone but this was not prescribed for him at d/c. Pt and family upset and requesting to speak with someone from neurosx. [facility] notified. Will continue to monitor.

## 2023-01-06 NOTE — PLAN OF CARE
Problem: Discharge Planning  Goal: Discharge to home or other facility with appropriate resources  1/6/2023 0830 by Gaylia Libman, RN  Outcome: Progressing  1/5/2023 1957 by Lupis Camargo RN  Outcome: Montserrat Pérez (Taken 1/5/2023 1957)  Discharge to home or other facility with appropriate resources:   Identify barriers to discharge with patient and caregiver   Arrange for needed discharge resources and transportation as appropriate   Identify discharge learning needs (meds, wound care, etc)   Arrange for interpreters to assist at discharge as needed     Problem: Pain  Goal: Verbalizes/displays adequate comfort level or baseline comfort level  1/6/2023 0830 by Gaylia Libman, RN  Outcome: Progressing  1/5/2023 1957 by Lupis Camargo RN  Outcome: Progressing  Flowsheets (Taken 1/5/2023 1957)  Verbalizes/displays adequate comfort level or baseline comfort level:   Encourage patient to monitor pain and request assistance   Assess pain using appropriate pain scale   Administer analgesics based on type and severity of pain and evaluate response   Implement non-pharmacological measures as appropriate and evaluate response     Problem: Safety - Adult  Goal: Free from fall injury  Outcome: Progressing     Problem: ABCDS Injury Assessment  Goal: Absence of physical injury  Outcome: Progressing

## 2023-01-07 LAB
A/G RATIO: 1.5 (ref 1.1–2.2)
ALBUMIN SERPL-MCNC: 4.1 G/DL (ref 3.4–5)
ALP BLD-CCNC: 124 U/L (ref 40–129)
ALT SERPL-CCNC: 38 U/L (ref 10–40)
ANION GAP SERPL CALCULATED.3IONS-SCNC: 12 MMOL/L (ref 3–16)
AST SERPL-CCNC: 19 U/L (ref 15–37)
BANDED NEUTROPHILS RELATIVE PERCENT: 1 % (ref 0–7)
BANDED NEUTROPHILS RELATIVE PERCENT: 2 % (ref 0–7)
BASOPHILS ABSOLUTE: 0 K/UL (ref 0–0.2)
BASOPHILS ABSOLUTE: 0 K/UL (ref 0–0.2)
BASOPHILS RELATIVE PERCENT: 0 %
BASOPHILS RELATIVE PERCENT: 0 %
BILIRUB SERPL-MCNC: 0.4 MG/DL (ref 0–1)
BILIRUBIN URINE: NEGATIVE
BLOOD, URINE: NEGATIVE
BUN BLDV-MCNC: 25 MG/DL (ref 7–20)
C-REACTIVE PROTEIN: <3 MG/L (ref 0–5.1)
CALCIUM SERPL-MCNC: 9.3 MG/DL (ref 8.3–10.6)
CHLORIDE BLD-SCNC: 103 MMOL/L (ref 99–110)
CLARITY: CLEAR
CO2: 21 MMOL/L (ref 21–32)
COLOR: YELLOW
CREAT SERPL-MCNC: 0.9 MG/DL (ref 0.9–1.3)
EOSINOPHILS ABSOLUTE: 0 K/UL (ref 0–0.6)
EOSINOPHILS ABSOLUTE: 0 K/UL (ref 0–0.6)
EOSINOPHILS RELATIVE PERCENT: 0 %
EOSINOPHILS RELATIVE PERCENT: 0 %
GFR SERPL CREATININE-BSD FRML MDRD: >60 ML/MIN/{1.73_M2}
GLUCOSE BLD-MCNC: 215 MG/DL (ref 70–99)
GLUCOSE URINE: NEGATIVE MG/DL
HCT VFR BLD CALC: 48.7 % (ref 40.5–52.5)
HCT VFR BLD CALC: 51.7 % (ref 40.5–52.5)
HEMOGLOBIN: 16.4 G/DL (ref 13.5–17.5)
HEMOGLOBIN: 17.2 G/DL (ref 13.5–17.5)
KETONES, URINE: NEGATIVE MG/DL
LACTIC ACID: 3.1 MMOL/L (ref 0.4–2)
LEUKOCYTE ESTERASE, URINE: NEGATIVE
LYMPHOCYTES ABSOLUTE: 2.4 K/UL (ref 1–5.1)
LYMPHOCYTES ABSOLUTE: 2.4 K/UL (ref 1–5.1)
LYMPHOCYTES RELATIVE PERCENT: 11 %
LYMPHOCYTES RELATIVE PERCENT: 12 %
MCH RBC QN AUTO: 29.2 PG (ref 26–34)
MCH RBC QN AUTO: 29.9 PG (ref 26–34)
MCHC RBC AUTO-ENTMCNC: 33.4 G/DL (ref 31–36)
MCHC RBC AUTO-ENTMCNC: 33.7 G/DL (ref 31–36)
MCV RBC AUTO: 87.4 FL (ref 80–100)
MCV RBC AUTO: 88.7 FL (ref 80–100)
METAMYELOCYTES RELATIVE PERCENT: 1 %
MICROSCOPIC EXAMINATION: NORMAL
MONOCYTES ABSOLUTE: 0.4 K/UL (ref 0–1.3)
MONOCYTES ABSOLUTE: 1.3 K/UL (ref 0–1.3)
MONOCYTES RELATIVE PERCENT: 2 %
MONOCYTES RELATIVE PERCENT: 6 %
MYELOCYTE PERCENT: 1 %
NEUTROPHILS ABSOLUTE: 16.9 K/UL (ref 1.7–7.7)
NEUTROPHILS ABSOLUTE: 17.8 K/UL (ref 1.7–7.7)
NEUTROPHILS RELATIVE PERCENT: 82 %
NEUTROPHILS RELATIVE PERCENT: 82 %
NITRITE, URINE: NEGATIVE
PDW BLD-RTO: 13.7 % (ref 12.4–15.4)
PDW BLD-RTO: 13.8 % (ref 12.4–15.4)
PH UA: 6 (ref 5–8)
PLATELET # BLD: 206 K/UL (ref 135–450)
PLATELET # BLD: 231 K/UL (ref 135–450)
PMV BLD AUTO: 9.3 FL (ref 5–10.5)
PMV BLD AUTO: 9.4 FL (ref 5–10.5)
POTASSIUM REFLEX MAGNESIUM: 4.2 MMOL/L (ref 3.5–5.1)
PROCALCITONIN: 0.02 NG/ML (ref 0–0.15)
PROTEIN UA: NEGATIVE MG/DL
RBC # BLD: 5.49 M/UL (ref 4.2–5.9)
RBC # BLD: 5.91 M/UL (ref 4.2–5.9)
SODIUM BLD-SCNC: 136 MMOL/L (ref 136–145)
SPECIFIC GRAVITY UA: 1.02 (ref 1–1.03)
TOTAL PROTEIN: 6.8 G/DL (ref 6.4–8.2)
URINE REFLEX TO CULTURE: NORMAL
URINE TYPE: NORMAL
UROBILINOGEN, URINE: 0.2 E.U./DL
WBC # BLD: 19.7 K/UL (ref 4–11)
WBC # BLD: 21.5 K/UL (ref 4–11)

## 2023-01-07 PROCEDURE — 86140 C-REACTIVE PROTEIN: CPT

## 2023-01-07 PROCEDURE — 83605 ASSAY OF LACTIC ACID: CPT

## 2023-01-07 PROCEDURE — 6370000000 HC RX 637 (ALT 250 FOR IP): Performed by: NURSE PRACTITIONER

## 2023-01-07 PROCEDURE — 1200000000 HC SEMI PRIVATE

## 2023-01-07 PROCEDURE — 81003 URINALYSIS AUTO W/O SCOPE: CPT

## 2023-01-07 PROCEDURE — 85025 COMPLETE CBC W/AUTO DIFF WBC: CPT

## 2023-01-07 PROCEDURE — 6360000002 HC RX W HCPCS: Performed by: NURSE PRACTITIONER

## 2023-01-07 PROCEDURE — 6360000002 HC RX W HCPCS: Performed by: INTERNAL MEDICINE

## 2023-01-07 PROCEDURE — 6370000000 HC RX 637 (ALT 250 FOR IP): Performed by: NEUROLOGICAL SURGERY

## 2023-01-07 PROCEDURE — 6370000000 HC RX 637 (ALT 250 FOR IP): Performed by: INTERNAL MEDICINE

## 2023-01-07 PROCEDURE — 80053 COMPREHEN METABOLIC PANEL: CPT

## 2023-01-07 PROCEDURE — 84145 PROCALCITONIN (PCT): CPT

## 2023-01-07 PROCEDURE — 36415 COLL VENOUS BLD VENIPUNCTURE: CPT

## 2023-01-07 PROCEDURE — 2580000003 HC RX 258: Performed by: INTERNAL MEDICINE

## 2023-01-07 RX ORDER — OXYCODONE HYDROCHLORIDE 5 MG/1
TABLET ORAL
Qty: 30 TABLET | Refills: 0 | Status: SHIPPED | OUTPATIENT
Start: 2023-01-07 | End: 2023-01-14

## 2023-01-07 RX ORDER — GABAPENTIN 100 MG/1
100 CAPSULE ORAL 3 TIMES DAILY
Qty: 90 CAPSULE | Refills: 0 | Status: SHIPPED | OUTPATIENT
Start: 2023-01-07 | End: 2023-02-06

## 2023-01-07 RX ORDER — TIZANIDINE 4 MG/1
4 TABLET ORAL EVERY 6 HOURS PRN
Qty: 40 TABLET | Refills: 0 | Status: SHIPPED
Start: 2023-01-07 | End: 2023-01-08 | Stop reason: HOSPADM

## 2023-01-07 RX ORDER — LIDOCAINE 4 G/G
1 PATCH TOPICAL DAILY
Qty: 30 EACH | Refills: 0 | Status: SHIPPED | OUTPATIENT
Start: 2023-01-07 | End: 2023-02-06

## 2023-01-07 RX ORDER — METHOCARBAMOL 750 MG/1
750 TABLET, FILM COATED ORAL EVERY 6 HOURS
Qty: 40 TABLET | Refills: 0 | Status: SHIPPED | OUTPATIENT
Start: 2023-01-07 | End: 2023-01-17

## 2023-01-07 RX ADMIN — TIZANIDINE 4 MG: 4 TABLET ORAL at 12:03

## 2023-01-07 RX ADMIN — SODIUM CHLORIDE, PRESERVATIVE FREE 10 ML: 5 INJECTION INTRAVENOUS at 09:01

## 2023-01-07 RX ADMIN — CLONAZEPAM 1 MG: 1 TABLET ORAL at 14:26

## 2023-01-07 RX ADMIN — DEXAMETHASONE SODIUM PHOSPHATE 4 MG: 4 INJECTION, SOLUTION INTRAMUSCULAR; INTRAVENOUS at 09:00

## 2023-01-07 RX ADMIN — GABAPENTIN 100 MG: 100 CAPSULE ORAL at 21:01

## 2023-01-07 RX ADMIN — LISINOPRIL 5 MG: 5 TABLET ORAL at 09:00

## 2023-01-07 RX ADMIN — OXYCODONE 10 MG: 5 TABLET ORAL at 03:12

## 2023-01-07 RX ADMIN — METHOCARBAMOL TABLETS 750 MG: 750 TABLET, COATED ORAL at 23:13

## 2023-01-07 RX ADMIN — DEXAMETHASONE SODIUM PHOSPHATE 4 MG: 4 INJECTION, SOLUTION INTRAMUSCULAR; INTRAVENOUS at 03:12

## 2023-01-07 RX ADMIN — QUETIAPINE FUMARATE 50 MG: 25 TABLET ORAL at 21:01

## 2023-01-07 RX ADMIN — SODIUM CHLORIDE, PRESERVATIVE FREE 10 ML: 5 INJECTION INTRAVENOUS at 21:30

## 2023-01-07 RX ADMIN — METHOCARBAMOL TABLETS 750 MG: 750 TABLET, COATED ORAL at 06:19

## 2023-01-07 RX ADMIN — OXYCODONE 10 MG: 5 TABLET ORAL at 21:05

## 2023-01-07 RX ADMIN — METHOCARBAMOL TABLETS 750 MG: 750 TABLET, COATED ORAL at 17:20

## 2023-01-07 RX ADMIN — OXYCODONE 10 MG: 5 TABLET ORAL at 09:00

## 2023-01-07 RX ADMIN — MORPHINE SULFATE 4 MG: 2 INJECTION, SOLUTION INTRAMUSCULAR; INTRAVENOUS at 15:32

## 2023-01-07 RX ADMIN — LEVOTHYROXINE SODIUM 125 MCG: 0.12 TABLET ORAL at 06:19

## 2023-01-07 RX ADMIN — GABAPENTIN 100 MG: 100 CAPSULE ORAL at 09:00

## 2023-01-07 RX ADMIN — GABAPENTIN 100 MG: 100 CAPSULE ORAL at 13:16

## 2023-01-07 RX ADMIN — OXYCODONE 10 MG: 5 TABLET ORAL at 13:16

## 2023-01-07 RX ADMIN — METHOCARBAMOL TABLETS 750 MG: 750 TABLET, COATED ORAL at 12:03

## 2023-01-07 ASSESSMENT — PAIN DESCRIPTION - DESCRIPTORS
DESCRIPTORS: ACHING

## 2023-01-07 ASSESSMENT — PAIN SCALES - GENERAL
PAINLEVEL_OUTOF10: 4
PAINLEVEL_OUTOF10: 7
PAINLEVEL_OUTOF10: 4
PAINLEVEL_OUTOF10: 8
PAINLEVEL_OUTOF10: 4
PAINLEVEL_OUTOF10: 8
PAINLEVEL_OUTOF10: 5
PAINLEVEL_OUTOF10: 3
PAINLEVEL_OUTOF10: 4
PAINLEVEL_OUTOF10: 5

## 2023-01-07 ASSESSMENT — PAIN DESCRIPTION - ORIENTATION
ORIENTATION: MID
ORIENTATION: RIGHT
ORIENTATION: RIGHT;MID
ORIENTATION: RIGHT
ORIENTATION: MID

## 2023-01-07 ASSESSMENT — PAIN - FUNCTIONAL ASSESSMENT
PAIN_FUNCTIONAL_ASSESSMENT: PREVENTS OR INTERFERES SOME ACTIVE ACTIVITIES AND ADLS
PAIN_FUNCTIONAL_ASSESSMENT: PREVENTS OR INTERFERES SOME ACTIVE ACTIVITIES AND ADLS
PAIN_FUNCTIONAL_ASSESSMENT: ACTIVITIES ARE NOT PREVENTED

## 2023-01-07 ASSESSMENT — PAIN DESCRIPTION - ONSET
ONSET: ON-GOING

## 2023-01-07 ASSESSMENT — PAIN DESCRIPTION - FREQUENCY
FREQUENCY: CONTINUOUS

## 2023-01-07 ASSESSMENT — PAIN DESCRIPTION - LOCATION
LOCATION: BACK

## 2023-01-07 ASSESSMENT — PAIN DESCRIPTION - PAIN TYPE
TYPE: ACUTE PAIN

## 2023-01-07 NOTE — PLAN OF CARE
Problem: Pain  Goal: Verbalizes/displays adequate comfort level or baseline comfort level  1/7/2023 0229 by William Viera RN  Outcome: Progressing  1/6/2023 1849 by Author Abilio RN  Outcome: Progressing  Flowsheets (Taken 1/6/2023 1849)  Verbalizes/displays adequate comfort level or baseline comfort level:   Encourage patient to monitor pain and request assistance   Assess pain using appropriate pain scale   Administer analgesics based on type and severity of pain and evaluate response   Implement non-pharmacological measures as appropriate and evaluate response     Problem: Safety - Adult  Goal: Free from fall injury  Outcome: Progressing

## 2023-01-07 NOTE — PROGRESS NOTES
I was asked to see the patient instead of his prior hospitalist as patient asked to change providers. Patient is verbally aggressive and not happy with delay in discharge for work up of leukocytosis, having had difficult IV access and bruises on the right forearm. Using name calling- \"dummies\", \"incompetent\". Patient reports he was promised discharge with prescription for oxycodone by neurosurgical team. Asking why cant he have his bottle of klonopine at bedside to take it as he needs. Patient is getting more confrontational despite my explanations to the point I had to leave the room. I reviewed labs and patient has normal CRP and procalcitonin. Suspect elevated WBC and lactic acid are related to steroids as patient is afebrile and also does not have anion gap. Potential lab error for lactic acid. He does not appear toxic. At this point I advised the staff to message neurosurgery to verify their intentions for medications on discharge. I asked the staff to provide the patient with contact information for patient representative department. Iman Villavicencio MD    Addendum:  Patient was seen second time and wife Rizwan Trevor was on Facetime. Patient is calm, apologetic for his anger earlier. I suspect patient had a panic attack causing above issues. We were able to have a nice conversation and decided on discharge tomorrow. Will call his [facility] pharmacy to verify hours to be able to prescribe oxycodone on weekends.    Iman Villavicencio MD

## 2023-01-07 NOTE — PLAN OF CARE
Problem: Discharge Planning  Goal: Discharge to home or other facility with appropriate resources  Outcome: Progressing     Problem: Pain  Goal: Verbalizes/displays adequate comfort level or baseline comfort level  1/7/2023 0948 by Jil Guzman RN  Outcome: Progressing     Problem: Safety - Adult  Goal: Free from fall injury  1/7/2023 0948 by Jil Guzman RN  Outcome: Progressing

## 2023-01-07 NOTE — PROGRESS NOTES
Pt a/o x4. VSS on room air. Pain managed with PRN pain medications per MAR. States pain is much better tonight. Resting well overnight. Voiding adequately.

## 2023-01-07 NOTE — PROGRESS NOTES
Patient agitated and verbally aggressive with staff. Feels that he was being ignored and his problems are not being addressed. Pt requested a new physician see him. Dr. Bozena Claire able to take over care for this patient. Klonopin administered per order. Patient requested another dose of klonopin 15 mins after first dose given. Educated patient that he was not able to have klonopin for another 12 hours. Patient continued to get more agitated. Pacing around in his room and physically shaking in anger. One dose of morphine administered for complaints of pain. Patient now calm and cooperative. Apologetic for his previous behavior. Plans to D/C heike with oxycodone order. Will continue to monitor.

## 2023-01-07 NOTE — PROGRESS NOTES
Hospitalist Progress Note      PCP: aMura Rossi MD    Date of Admission: 1/2/2023    Chief Complaint: upper back and neck pain radiating to right upper extremity. This is associated with numbness and tingling in the right arm and decreased in the strength of handgrip.       Subjective:   Patient underwent C6-7 translaminar epidural injection of celestone and sensorcaine   Pain is better   Developing leukocytosis   D/c iv steroid and monitor   CBC up to 21 from 17 yesterday  No fever  Check UA   Lactic acid  Serum procal and CRP  If lactic acid high , will order BC   Repeat CBC   Hold on dishcarge       Medications:  Reviewed    Infusion Medications    sodium chloride 25 mL/hr at 01/05/23 1120     Scheduled Medications    methocarbamol  750 mg Oral 4 times per day    lisinopril  5 mg Oral Daily    QUEtiapine  50 mg Oral Nightly    levothyroxine  125 mcg Oral Daily    gabapentin  100 mg Oral TID    sodium chloride flush  5-40 mL IntraVENous 2 times per day    [Held by provider] enoxaparin  40 mg SubCUTAneous Daily     PRN Meds: tiZANidine, ketorolac, potassium chloride, oxyCODONE, clonazePAM, morphine, sodium chloride flush, sodium chloride, ondansetron **OR** ondansetron, polyethylene glycol, acetaminophen **OR** acetaminophen      Intake/Output Summary (Last 24 hours) at 1/7/2023 1039  Last data filed at 1/7/2023 0600  Gross per 24 hour   Intake 1680 ml   Output --   Net 1680 ml         Physical Exam Performed:    BP (!) 156/91   Pulse 64   Temp 97.5 °F (36.4 °C) (Oral)   Resp 17   Ht 5' 10\" (1.778 m)   SpO2 96%   BMI 32.42 kg/m²       GEN alert, in no distress  HEENT normocephalic, anicteric sclera, EOMI, mucosa moist, no stridor  NECK supple, trachea midline  RESP on RA, in no distress, clear to auscultation  CARDS RRR, S1, S2, no murmurs, no edema, radial pulse 2+, DP pulse 2+  ABD +BS, soft nontender  MSK non-tender along spine; no cyanosis, no clubbing  SKIN warm, dry  NEURO alert, oriented x 3, no facial asymmetry, no dysarthria, moving spontaneously  PSYCH normal mood        Labs:   Recent Labs     01/05/23  0930 01/06/23  0559 01/07/23  0712   WBC 9.7 17.3* 21.5*   HGB 14.7 16.8 17.2   HCT 42.7 48.9 51.7    221 231       Recent Labs     01/05/23  0930 01/05/23  2337 01/06/23  0559 01/07/23  0712     --  139 136   K 2.9* 4.2 4.3 4.2   *  --  101 103   CO2 19*  --  24 21   BUN 19  --  16 25*   CREATININE 0.8*  --  0.9 0.9   CALCIUM 7.1*  --  9.8 9.3       Recent Labs     01/05/23  0930 01/06/23  0559 01/07/23  0712   AST 14* 15 19   ALT 23 30 38   BILITOT <0.2 0.4 0.4   ALKPHOS 71 96 124       No results for input(s): INR in the last 72 hours. No results for input(s): Lattie Carlota in the last 72 hours. Urinalysis:      Lab Results   Component Value Date/Time    NITRU Negative 12/07/2021 01:11 AM    WBCUA 8 12/07/2021 01:11 AM    RBCUA 0-2 12/07/2021 01:11 AM    BLOODU Negative 12/07/2021 01:11 AM    SPECGRAV 1.029 12/07/2021 01:11 AM    GLUCOSEU 500 12/07/2021 01:11 AM       Radiology:  IR INJ CERVICAL/THORACIC TRANSFORAMINAL EPIDURAL SINGLE LEVEL   Final Result   Impression:   Successful C6-7 translaminar epidural injection of Celestone and Sensorcaine. Pre-procedure pain score : 8   Post-procedure pain score : 2      Fluoroscopy time : 0.8 minutes   Number of exposures obtained : 6   Blood loss : minimal (less than 5 cc)   Specimens removed : none         MRI CERVICAL SPINE WO CONTRAST   Final Result      1. Multilevel cervical spondylosis with disc bulge and hypertrophic spurring greatest at the C6-C7 level causing moderate central canal stenosis with cord compression. Multilevel severe neuroforaminal narrowing as detailed above.                  Assessment/Plan:    Active Hospital Problems    Diagnosis Date Noted    Cervical stenosis of spine [M48.02] 01/02/2023     Priority: Medium     The patient is a 50 y.o. male with history of ADHD, anxiety, depression, PTSD, HTN, hypothyroidism, chronic back pain, who presents with several days of severe upper back and neck pain radiating to right upper extremity. This is associated with numbness and tingling in the right arm and decreased in the strength of handgrip. Patient denies any injuries to his back and he has had prior surgeries on both shoulders and he has been very careful and avoiding heavy lifting or strenuous workouts. There is been no car injuries. Patient was seen at outside hospital on 12/30; at that time he underwent evaluation of his right shoulder and was referred for orthopedic outpatient evaluation. ER physician reports that patient was in significant pain, reporting radiating pain up and down his back and his shoulders. Patient had CT chest abdomen pelvis with contrast which ruled out dissection. He required several doses IV pain medications to get comfortable. Imaging of cervical spine revealed osteophytes and canal stenosis. Neurosurgery was consulted and recommended obtaining MRI and transfer to [facility] for further evaluation. Cervical Radiculopathy  - CT Cervical spine wo contrast 1/2/23 showed Posterior osteophyte and possibly a mild degree of broad posterior disc extension C6-T1 resulting in a degree of central canal stenosis. No evidence of fracture with degenerative changes.  - MRI of Cervical Spine wo contrast 1/3/23 showed multilevel cervical spondylosis with disc bulge and hypertrophic spurring greatest at the C6-C7 level causing moderate central canal stenosis with cord compression. Multilevel severe neuroforaminal narrowing.  - Received decadron 10 mg (1/2) in ED.  - Underwent epidural steroid injection on 1/3/22.  - Started gabapentin 100 mg TID, robaxin 100 mg Q8H.  - Neurosurgery recommended patient remain inpatient to monitor symptoms.   Pain is better   Plan d/c on roboxin , lidocaine patch , tinizidine and taper dose steroid   Recommend no strong narcotic as the patient on benzo at home   May need surgery outpatient     Developing leukocytosis   D/c iv steroid and monitor   CBC up to 21 from 17 yesterday, due to steroid ? ??  No fever  Check UA   Lactic acid  Serum procal and CRP  If lactic acid high , will order BC   Repeat CBC   Hold on dishcarge       Hypokalemia and hypomagnesemia : on replacement protocol      Hypertension  - Continue home lisinopril 5 mg daily. Hypothyroidism  - Continue home levothyroxine 125 mg daily. - tsh. Anxiety  Depression  PTSD  - Continue seroquel XL 50 mg HS, klonopin 1 mg Q12H PRN. DVT Prophylaxis:   Diet: ADULT DIET;  Regular  Code Status: Full Code    PT/OT Eval Status: PT/OT    Dispo -pending work up of leukocytosis   Virgen Guzman MD

## 2023-01-08 VITALS
RESPIRATION RATE: 17 BRPM | DIASTOLIC BLOOD PRESSURE: 70 MMHG | BODY MASS INDEX: 32.42 KG/M2 | SYSTOLIC BLOOD PRESSURE: 124 MMHG | OXYGEN SATURATION: 96 % | HEIGHT: 70 IN | HEART RATE: 65 BPM | TEMPERATURE: 97.3 F

## 2023-01-08 PROCEDURE — 6370000000 HC RX 637 (ALT 250 FOR IP): Performed by: NURSE PRACTITIONER

## 2023-01-08 PROCEDURE — 6370000000 HC RX 637 (ALT 250 FOR IP): Performed by: INTERNAL MEDICINE

## 2023-01-08 PROCEDURE — 2580000003 HC RX 258: Performed by: INTERNAL MEDICINE

## 2023-01-08 PROCEDURE — 6370000000 HC RX 637 (ALT 250 FOR IP): Performed by: NEUROLOGICAL SURGERY

## 2023-01-08 RX ORDER — DIAZEPAM 5 MG/1
5 TABLET ORAL EVERY 6 HOURS PRN
Status: DISCONTINUED | OUTPATIENT
Start: 2023-01-08 | End: 2023-01-08 | Stop reason: HOSPADM

## 2023-01-08 RX ORDER — DEXAMETHASONE 4 MG/1
TABLET ORAL
Qty: 24 TABLET | Refills: 0 | Status: SHIPPED | OUTPATIENT
Start: 2023-01-08 | End: 2023-01-20

## 2023-01-08 RX ORDER — DIAZEPAM 5 MG/1
5 TABLET ORAL EVERY 8 HOURS PRN
Qty: 30 TABLET | Refills: 0 | Status: SHIPPED | OUTPATIENT
Start: 2023-01-08 | End: 2023-01-18

## 2023-01-08 RX ADMIN — CLONAZEPAM 1 MG: 1 TABLET ORAL at 13:19

## 2023-01-08 RX ADMIN — SODIUM CHLORIDE, PRESERVATIVE FREE 10 ML: 5 INJECTION INTRAVENOUS at 08:19

## 2023-01-08 RX ADMIN — OXYCODONE 10 MG: 5 TABLET ORAL at 10:10

## 2023-01-08 RX ADMIN — LEVOTHYROXINE SODIUM 125 MCG: 0.12 TABLET ORAL at 05:18

## 2023-01-08 RX ADMIN — METHOCARBAMOL TABLETS 750 MG: 750 TABLET, COATED ORAL at 05:18

## 2023-01-08 RX ADMIN — OXYCODONE 10 MG: 5 TABLET ORAL at 01:12

## 2023-01-08 RX ADMIN — LISINOPRIL 5 MG: 5 TABLET ORAL at 08:19

## 2023-01-08 RX ADMIN — METHOCARBAMOL TABLETS 750 MG: 750 TABLET, COATED ORAL at 11:28

## 2023-01-08 RX ADMIN — GABAPENTIN 100 MG: 100 CAPSULE ORAL at 08:19

## 2023-01-08 RX ADMIN — OXYCODONE 10 MG: 5 TABLET ORAL at 05:18

## 2023-01-08 RX ADMIN — GABAPENTIN 100 MG: 100 CAPSULE ORAL at 13:19

## 2023-01-08 RX ADMIN — DIAZEPAM 5 MG: 5 TABLET ORAL at 11:28

## 2023-01-08 ASSESSMENT — PAIN DESCRIPTION - ORIENTATION
ORIENTATION: RIGHT
ORIENTATION: RIGHT;MID
ORIENTATION: RIGHT

## 2023-01-08 ASSESSMENT — PAIN DESCRIPTION - PAIN TYPE
TYPE: ACUTE PAIN

## 2023-01-08 ASSESSMENT — PAIN - FUNCTIONAL ASSESSMENT
PAIN_FUNCTIONAL_ASSESSMENT: PREVENTS OR INTERFERES SOME ACTIVE ACTIVITIES AND ADLS
PAIN_FUNCTIONAL_ASSESSMENT: PREVENTS OR INTERFERES SOME ACTIVE ACTIVITIES AND ADLS

## 2023-01-08 ASSESSMENT — PAIN DESCRIPTION - DESCRIPTORS
DESCRIPTORS: ACHING

## 2023-01-08 ASSESSMENT — PAIN SCALES - GENERAL
PAINLEVEL_OUTOF10: 4
PAINLEVEL_OUTOF10: 5
PAINLEVEL_OUTOF10: 8
PAINLEVEL_OUTOF10: 6
PAINLEVEL_OUTOF10: 4

## 2023-01-08 ASSESSMENT — PAIN DESCRIPTION - FREQUENCY
FREQUENCY: CONTINUOUS
FREQUENCY: CONTINUOUS

## 2023-01-08 ASSESSMENT — PAIN DESCRIPTION - LOCATION
LOCATION: BACK
LOCATION: SHOULDER
LOCATION: SHOULDER

## 2023-01-08 ASSESSMENT — PAIN DESCRIPTION - ONSET
ONSET: ON-GOING
ONSET: ON-GOING

## 2023-01-08 NOTE — PROGRESS NOTES
Patient alert and oriented x4. Calm and cooperative. Daphene Quails to discharge. IV removed and pressure applied. Prescriptions sent to [address] [place] [address] in [place]. Discharge paperwork discussed with patient. No other questions or concerns at this time.

## 2023-03-28 NOTE — PROGRESS NOTES
I was reviewing patient's medications with him including outside medications to make sure we had his list correct and he got very irritated with me. He stated he takes 1 blood pressure pill and a thyroid pill every few days. I asked who prescribed his thyroid medication and the dose because we do not have that on his list. We have 2 B/P medications on his list and patient states he cannot take medication if the doctor won't refill his medications. I then asked if he had received a covid vaccine.  Patient became angry stating I'm leaving and got up and walked out and threw his mask on the floor
Controlled with current regime
Controlled with current regime

## 2024-02-06 ENCOUNTER — HOSPITAL ENCOUNTER (EMERGENCY)
Age: 54
Discharge: HOME | End: 2024-02-06
Attending: EMERGENCY MEDICINE
Payer: COMMERCIAL

## 2024-02-06 ENCOUNTER — APPOINTMENT (OUTPATIENT)
Dept: GENERAL RADIOLOGY | Age: 54
End: 2024-02-06
Payer: COMMERCIAL

## 2024-02-06 VITALS
DIASTOLIC BLOOD PRESSURE: 70 MMHG | HEIGHT: 70 IN | BODY MASS INDEX: 27.65 KG/M2 | SYSTOLIC BLOOD PRESSURE: 104 MMHG | HEART RATE: 83 BPM | TEMPERATURE: 99.1 F | RESPIRATION RATE: 16 BRPM | WEIGHT: 193.12 LBS | OXYGEN SATURATION: 97 %

## 2024-02-06 DIAGNOSIS — J10.1 INFLUENZA A: Primary | ICD-10-CM

## 2024-02-06 LAB
ALBUMIN SERPL-MCNC: 4.1 G/DL (ref 3.4–5)
ALBUMIN/GLOB SERPL: 1.6 {RATIO} (ref 1.1–2.2)
ALP SERPL-CCNC: 88 U/L (ref 40–129)
ALT SERPL-CCNC: 29 U/L (ref 10–40)
ANION GAP SERPL CALCULATED.3IONS-SCNC: 16 MMOL/L (ref 3–16)
AST SERPL-CCNC: 34 U/L (ref 15–37)
BASE EXCESS BLDV CALC-SCNC: -1.6 MMOL/L (ref -2–3)
BASOPHILS # BLD: 0.1 K/UL (ref 0–0.2)
BASOPHILS NFR BLD: 0.7 %
BILIRUB SERPL-MCNC: 0.4 MG/DL (ref 0–1)
BUN SERPL-MCNC: 20 MG/DL (ref 7–20)
CALCIUM SERPL-MCNC: 9.4 MG/DL (ref 8.3–10.6)
CHLORIDE SERPL-SCNC: 101 MMOL/L (ref 99–110)
CO2 BLDV-SCNC: 20 MMOL/L
CO2 SERPL-SCNC: 16 MMOL/L (ref 21–32)
COHGB MFR BLDV: 2.3 % (ref 0–1.5)
CREAT SERPL-MCNC: 1.2 MG/DL (ref 0.9–1.3)
DEPRECATED RDW RBC AUTO: 13.8 % (ref 12.4–15.4)
EOSINOPHIL # BLD: 0 K/UL (ref 0–0.6)
EOSINOPHIL NFR BLD: 0.1 %
FLUAV RNA RESP QL NAA+PROBE: DETECTED
FLUBV RNA RESP QL NAA+PROBE: NOT DETECTED
GFR SERPLBLD CREATININE-BSD FMLA CKD-EPI: >60 ML/MIN/{1.73_M2}
GLUCOSE SERPL-MCNC: 94 MG/DL (ref 70–99)
HCO3 BLDV-SCNC: 19.6 MMOL/L (ref 24–28)
HCT VFR BLD AUTO: 45.8 % (ref 40.5–52.5)
HGB BLD-MCNC: 15.9 G/DL (ref 13.5–17.5)
LACTATE BLDV-SCNC: 1 MMOL/L (ref 0.4–1.9)
LYMPHOCYTES # BLD: 1.9 K/UL (ref 1–5.1)
LYMPHOCYTES NFR BLD: 23.6 %
MCH RBC QN AUTO: 31.1 PG (ref 26–34)
MCHC RBC AUTO-ENTMCNC: 34.7 G/DL (ref 31–36)
MCV RBC AUTO: 89.5 FL (ref 80–100)
METHGB MFR BLDV: 0.9 % (ref 0–1.5)
MONOCYTES # BLD: 1.4 K/UL (ref 0–1.3)
MONOCYTES NFR BLD: 17.8 %
NEUTROPHILS # BLD: 4.7 K/UL (ref 1.7–7.7)
NEUTROPHILS NFR BLD: 57.8 %
PCO2 BLDV: 25 MMHG (ref 41–51)
PH BLDV: 7.5 [PH] (ref 7.35–7.45)
PLATELET # BLD AUTO: 177 K/UL (ref 135–450)
PMV BLD AUTO: 8.9 FL (ref 5–10.5)
PO2 BLDV: 125 MMHG (ref 25–40)
POTASSIUM SERPL-SCNC: 3.8 MMOL/L (ref 3.5–5.1)
PROCALCITONIN SERPL IA-MCNC: 0.07 NG/ML (ref 0–0.15)
PROT SERPL-MCNC: 6.6 G/DL (ref 6.4–8.2)
RBC # BLD AUTO: 5.11 M/UL (ref 4.2–5.9)
SAO2 % BLDV: 99 %
SARS-COV-2 RNA RESP QL NAA+PROBE: NOT DETECTED
SODIUM SERPL-SCNC: 133 MMOL/L (ref 136–145)
TROPONIN, HIGH SENSITIVITY: 20 NG/L (ref 0–22)
WBC # BLD AUTO: 8.1 K/UL (ref 4–11)

## 2024-02-06 PROCEDURE — 84145 PROCALCITONIN (PCT): CPT

## 2024-02-06 PROCEDURE — 93005 ELECTROCARDIOGRAM TRACING: CPT | Performed by: EMERGENCY MEDICINE

## 2024-02-06 PROCEDURE — 80053 COMPREHEN METABOLIC PANEL: CPT

## 2024-02-06 PROCEDURE — 96375 TX/PRO/DX INJ NEW DRUG ADDON: CPT

## 2024-02-06 PROCEDURE — 87636 SARSCOV2 & INF A&B AMP PRB: CPT

## 2024-02-06 PROCEDURE — 36415 COLL VENOUS BLD VENIPUNCTURE: CPT

## 2024-02-06 PROCEDURE — 2580000003 HC RX 258

## 2024-02-06 PROCEDURE — 85025 COMPLETE CBC W/AUTO DIFF WBC: CPT

## 2024-02-06 PROCEDURE — 99285 EMERGENCY DEPT VISIT HI MDM: CPT

## 2024-02-06 PROCEDURE — 96374 THER/PROPH/DIAG INJ IV PUSH: CPT

## 2024-02-06 PROCEDURE — 71046 X-RAY EXAM CHEST 2 VIEWS: CPT

## 2024-02-06 PROCEDURE — 82803 BLOOD GASES ANY COMBINATION: CPT

## 2024-02-06 PROCEDURE — 84484 ASSAY OF TROPONIN QUANT: CPT

## 2024-02-06 PROCEDURE — 6370000000 HC RX 637 (ALT 250 FOR IP)

## 2024-02-06 PROCEDURE — 83605 ASSAY OF LACTIC ACID: CPT

## 2024-02-06 PROCEDURE — 6360000002 HC RX W HCPCS

## 2024-02-06 RX ORDER — ONDANSETRON 2 MG/ML
4 INJECTION INTRAMUSCULAR; INTRAVENOUS ONCE
Status: COMPLETED | OUTPATIENT
Start: 2024-02-06 | End: 2024-02-06

## 2024-02-06 RX ORDER — ACETAMINOPHEN 500 MG
1000 TABLET ORAL
Status: COMPLETED | OUTPATIENT
Start: 2024-02-06 | End: 2024-02-06

## 2024-02-06 RX ORDER — SODIUM CHLORIDE, SODIUM LACTATE, POTASSIUM CHLORIDE, AND CALCIUM CHLORIDE .6; .31; .03; .02 G/100ML; G/100ML; G/100ML; G/100ML
1000 INJECTION, SOLUTION INTRAVENOUS ONCE
Status: COMPLETED | OUTPATIENT
Start: 2024-02-06 | End: 2024-02-06

## 2024-02-06 RX ORDER — ONDANSETRON 4 MG/1
4 TABLET, ORALLY DISINTEGRATING ORAL 3 TIMES DAILY PRN
Qty: 21 TABLET | Refills: 0 | Status: SHIPPED | OUTPATIENT
Start: 2024-02-06 | End: 2024-02-07

## 2024-02-06 RX ORDER — KETOROLAC TROMETHAMINE 30 MG/ML
15 INJECTION, SOLUTION INTRAMUSCULAR; INTRAVENOUS ONCE
Status: COMPLETED | OUTPATIENT
Start: 2024-02-06 | End: 2024-02-06

## 2024-02-06 RX ORDER — OSELTAMIVIR PHOSPHATE 75 MG/1
75 CAPSULE ORAL 2 TIMES DAILY
Qty: 10 CAPSULE | Refills: 0 | Status: SHIPPED | OUTPATIENT
Start: 2024-02-06 | End: 2024-02-07

## 2024-02-06 RX ADMIN — ONDANSETRON 4 MG: 2 INJECTION INTRAMUSCULAR; INTRAVENOUS at 22:31

## 2024-02-06 RX ADMIN — KETOROLAC TROMETHAMINE 15 MG: 30 INJECTION, SOLUTION INTRAMUSCULAR; INTRAVENOUS at 22:22

## 2024-02-06 RX ADMIN — SODIUM CHLORIDE, POTASSIUM CHLORIDE, SODIUM LACTATE AND CALCIUM CHLORIDE 1000 ML: 600; 310; 30; 20 INJECTION, SOLUTION INTRAVENOUS at 22:19

## 2024-02-06 RX ADMIN — ACETAMINOPHEN 1000 MG: 500 TABLET ORAL at 22:23

## 2024-02-06 ASSESSMENT — ENCOUNTER SYMPTOMS
VOMITING: 0
SHORTNESS OF BREATH: 0
COUGH: 0
NAUSEA: 0
BACK PAIN: 0
EYE PAIN: 0
EYE ITCHING: 0
ABDOMINAL PAIN: 0
RHINORRHEA: 0
WHEEZING: 0
SORE THROAT: 0
CONSTIPATION: 0
DIARRHEA: 0
EYE DISCHARGE: 0

## 2024-02-06 ASSESSMENT — PAIN DESCRIPTION - FREQUENCY: FREQUENCY: CONTINUOUS

## 2024-02-06 ASSESSMENT — LIFESTYLE VARIABLES
HOW OFTEN DO YOU HAVE A DRINK CONTAINING ALCOHOL: 2-3 TIMES A WEEK
HOW MANY STANDARD DRINKS CONTAINING ALCOHOL DO YOU HAVE ON A TYPICAL DAY: 1 OR 2

## 2024-02-06 ASSESSMENT — PAIN SCALES - GENERAL
PAINLEVEL_OUTOF10: 8
PAINLEVEL_OUTOF10: 8

## 2024-02-06 ASSESSMENT — PAIN DESCRIPTION - ONSET: ONSET: ON-GOING

## 2024-02-06 ASSESSMENT — PAIN DESCRIPTION - LOCATION: LOCATION: GENERALIZED

## 2024-02-06 ASSESSMENT — PAIN DESCRIPTION - DESCRIPTORS: DESCRIPTORS: DISCOMFORT

## 2024-02-06 ASSESSMENT — PAIN - FUNCTIONAL ASSESSMENT
PAIN_FUNCTIONAL_ASSESSMENT: ACTIVITIES ARE NOT PREVENTED
PAIN_FUNCTIONAL_ASSESSMENT: 0-10

## 2024-02-06 ASSESSMENT — PAIN DESCRIPTION - PAIN TYPE: TYPE: ACUTE PAIN

## 2024-02-07 LAB
EKG ATRIAL RATE: 78 BPM
EKG DIAGNOSIS: NORMAL
EKG P AXIS: 56 DEGREES
EKG P-R INTERVAL: 136 MS
EKG Q-T INTERVAL: 394 MS
EKG QRS DURATION: 98 MS
EKG QTC CALCULATION (BAZETT): 449 MS
EKG R AXIS: 50 DEGREES
EKG T AXIS: 1 DEGREES
EKG VENTRICULAR RATE: 78 BPM

## 2024-02-07 RX ORDER — ONDANSETRON 4 MG/1
4 TABLET, ORALLY DISINTEGRATING ORAL 3 TIMES DAILY PRN
Qty: 21 TABLET | Refills: 0 | Status: SHIPPED | OUTPATIENT
Start: 2024-02-07

## 2024-02-07 RX ORDER — OSELTAMIVIR PHOSPHATE 75 MG/1
75 CAPSULE ORAL 2 TIMES DAILY
Qty: 10 CAPSULE | Refills: 0 | Status: SHIPPED | OUTPATIENT
Start: 2024-02-07 | End: 2024-02-12

## 2024-02-07 NOTE — DISCHARGE INSTRUCTIONS
Nedra, you were seen in the emergency department for body aches, cough, fever.  Your blood workup is reassuring here.  There is no evidence of sepsis.  You did test positive for influenza A.  This is likely to be the cause of all of your symptoms.  You will likely start to feel better over the next couple of days.  However in the meantime I highly recommend that you take 1000 mg of Tylenol every 8 hours.  Please also take ibuprofen 800 mg every 8 hours.  This will help with any pain, fever and will overall make you feel a lot better.  I want you to make sure that you are hydrating very well.  Please make sure that you are drinking at least 80 ounces of fluids per day.  Please make sure that you are eating small, regular meals throughout the day.  I have also given you prescription for Zofran as needed for nausea.  Please make an appointment with your primary care doctor for follow-up.

## 2024-02-07 NOTE — ED TRIAGE NOTES
[facility] Emergency Department  MEDICAL SCREENING EXAM    Date of Service: 2/6/2024    Reason for Visit: Dizziness, Chest Pain, and Fatigue (Pt came into the ED with Chest pain, dizziness, and fatigue. States last time this happened he was admitted for sepsis)        Patient History, Brief Exam, and Initial Assessment     Abbreviated HPI: Nedra Mora is a 54 y.o. male presenting with fatigue, fever, CP, HA, neck pain, weakness, and multiple other complaints.  Concerned for sepsis because he had that before with these sxs.  Reticent.  No abd tenderness.    INITIAL VITALS: BP: 104/70, Temp: 99.1 °F (37.3 °C), Pulse: 83, Respirations: 16, SpO2: 97 %    Plan     Patient was evaluated in the REU for a medical screening exam.  To further evaluate the presenting complaints, the following orders have been placed:  Orders Placed This Encounter   Procedures    COVID-19 & Influenza Combo    XR CHEST (2 VW)    CBC with Auto Differential    CMP w/ Reflex to MG    Troponin    Urinalysis with Reflex to Culture    Blood Gas, Venous    Lactate, Sepsis    Procalcitonin    Draw and hold 1 blood culture    EKG 12 Lead        See primary provider's note for full details and final disposition.     Current Facility-Administered Medications:   No orders of the defined types were placed in this encounter.        REU Dispo     Stable for lobby while awaiting ED bed      Relevant Medical History     Past Medical History:   Diagnosis Date    ADHD (attention deficit hyperactivity disorder)     Anxiety     Chronic back pain     Depression     Erectile dysfunction     Headache     Hypertension     Hypothyroidism     Low testosterone     Low testosterone level in male     Neuropathy     PTSD (post-traumatic stress disorder)     Thyroid disease      Past Surgical History:   Procedure Laterality Date    BICEPS TENDON REPAIR Left     SHOULDER SURGERY Right      Allergies   Allergen Reactions    Bactrim [Sulfamethoxazole-Trimethoprim] Anaphylaxis

## 2024-02-07 NOTE — ED PROVIDER NOTES
ED Attending Attestation Note     Date of evaluation: 2/6/2024    This patient was seen by the advance practice provider.  I have seen and examined the patient, agree with the workup, evaluation, management and diagnosis. The care plan has been discussed.  I have reviewed the ECG and concur with the VICTORIA's interpretation.  My assessment reveals complaints of fevers, body aches, chest pain, and lightheadedness.  Patient's symptoms started yesterday.  On arrival, patient is hemodynamically stable.  He has clear breath sounds and normal heart sounds.  Will check laboratory studies, imaging.     Diego Kelley MD  02/06/24 [id number]    
ordered.  Radiology: ordered.  ECG/medicine tests: ordered.    Risk  OTC drugs.  Prescription drug management.            Is this patient to be included in the SEP-1 core measure? No Exclusion criteria - the patient is NOT to be included for SEP-1 Core Measure due to: Does not meet SIRS or sepsis criteria    This patient was also evaluated by the attending physician. All care plans were discussed and agreed upon.    Clinical Impression     1. Influenza A        Disposition     PATIENT REFERRED TO:  [facility] Emergency Department  [address] [place] [address] [id number] [phone]  Go to   As needed, If symptoms worsen    Shy Lopez MD  [address]  [facility] [id number] [phone]    Schedule an appointment as soon as possible for a visit         DISCHARGE MEDICATIONS:  New Prescriptions    ONDANSETRON (ZOFRAN-ODT) 4 MG DISINTEGRATING TABLET    Take 1 tablet by mouth 3 times daily as needed for Nausea or Vomiting    OSELTAMIVIR (TAMIFLU) 75 MG CAPSULE    Take 1 capsule by mouth 2 times daily for 5 days       DISPOSITION Decision To Discharge 02/06/2024 11:14:32 PM        Tina Tony PA  02/06/24 1662

## 2024-02-21 ENCOUNTER — APPOINTMENT (OUTPATIENT)
Dept: GENERAL RADIOLOGY | Age: 54
End: 2024-02-21
Payer: COMMERCIAL

## 2024-02-21 ENCOUNTER — HOSPITAL ENCOUNTER (EMERGENCY)
Age: 54
Discharge: HOME | End: 2024-02-21
Attending: EMERGENCY MEDICINE
Payer: COMMERCIAL

## 2024-02-21 VITALS
SYSTOLIC BLOOD PRESSURE: 127 MMHG | RESPIRATION RATE: 18 BRPM | HEART RATE: 89 BPM | DIASTOLIC BLOOD PRESSURE: 100 MMHG | BODY MASS INDEX: 26.45 KG/M2 | HEIGHT: 70 IN | TEMPERATURE: 98.1 F | WEIGHT: 184.75 LBS | OXYGEN SATURATION: 97 %

## 2024-02-21 DIAGNOSIS — S29.012A STRAIN OF THORACIC BACK REGION: ICD-10-CM

## 2024-02-21 DIAGNOSIS — S86.112A STRAIN OF GASTROCNEMIUS MUSCLE OF LEFT LOWER EXTREMITY, INITIAL ENCOUNTER: Primary | ICD-10-CM

## 2024-02-21 DIAGNOSIS — S29.011A STRAIN OF RIGHT PECTORALIS MUSCLE, INITIAL ENCOUNTER: ICD-10-CM

## 2024-02-21 PROCEDURE — 6370000000 HC RX 637 (ALT 250 FOR IP): Performed by: EMERGENCY MEDICINE

## 2024-02-21 PROCEDURE — 99283 EMERGENCY DEPT VISIT LOW MDM: CPT

## 2024-02-21 PROCEDURE — 72070 X-RAY EXAM THORAC SPINE 2VWS: CPT

## 2024-02-21 PROCEDURE — 73590 X-RAY EXAM OF LOWER LEG: CPT

## 2024-02-21 PROCEDURE — 6370000000 HC RX 637 (ALT 250 FOR IP): Performed by: NURSE PRACTITIONER

## 2024-02-21 RX ORDER — LIDOCAINE 4 G/G
1 PATCH TOPICAL DAILY
Status: DISCONTINUED | OUTPATIENT
Start: 2024-02-21 | End: 2024-02-21 | Stop reason: HOSPADM

## 2024-02-21 RX ORDER — IBUPROFEN 400 MG/1
800 TABLET ORAL ONCE
Status: COMPLETED | OUTPATIENT
Start: 2024-02-21 | End: 2024-02-21

## 2024-02-21 RX ORDER — METHOCARBAMOL 500 MG/1
1000 TABLET, FILM COATED ORAL ONCE
Status: COMPLETED | OUTPATIENT
Start: 2024-02-21 | End: 2024-02-21

## 2024-02-21 RX ORDER — ACETAMINOPHEN 325 MG/1
650 TABLET ORAL ONCE
Status: COMPLETED | OUTPATIENT
Start: 2024-02-21 | End: 2024-02-21

## 2024-02-21 RX ORDER — METHOCARBAMOL 1000 MG/1
1000 TABLET, COATED ORAL 4 TIMES DAILY PRN
Qty: 20 TABLET | Refills: 0 | Status: SHIPPED | OUTPATIENT
Start: 2024-02-21 | End: 2024-02-26

## 2024-02-21 RX ADMIN — IBUPROFEN 800 MG: 400 TABLET, FILM COATED ORAL at 17:51

## 2024-02-21 RX ADMIN — ACETAMINOPHEN 650 MG: 325 TABLET ORAL at 17:51

## 2024-02-21 RX ADMIN — METHOCARBAMOL 1000 MG: 500 TABLET ORAL at 20:18

## 2024-02-21 ASSESSMENT — PAIN SCALES - GENERAL
PAINLEVEL_OUTOF10: 8
PAINLEVEL_OUTOF10: 6

## 2024-02-21 ASSESSMENT — PAIN DESCRIPTION - ORIENTATION
ORIENTATION: LEFT
ORIENTATION: LEFT

## 2024-02-21 ASSESSMENT — PAIN DESCRIPTION - LOCATION
LOCATION: LEG
LOCATION: ARM;BACK;KNEE

## 2024-02-21 NOTE — ED TRIAGE NOTES
Hypothyroidism     Low testosterone     Low testosterone level in male     Neuropathy     PTSD (post-traumatic stress disorder)     Thyroid disease      Past Surgical History:   Procedure Laterality Date    BICEPS TENDON REPAIR Left     SHOULDER SURGERY Right      Allergies   Allergen Reactions    Bactrim [Sulfamethoxazole-Trimethoprim] Anaphylaxis    Prednisone      Tolerated decadron during admission Dec 2022 with only elevated WBC count. No signs of allergic reaction.

## 2024-02-22 NOTE — ED NOTES
Ace wrap applied to pt L lower calf. Pt instructed on use and verbalized understanding.      Payton Bush RN  02/21/24 2025
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
## 2024-02-22 NOTE — ED NOTES
--Patient and pt family provided with discharge instructions and any prescriptions.   --Instructions, dosing, and follow-up appointments reviewed with patient/family. No further questions or needs at this time.   --Vital signs and patient stable upon discharge.  --Patient ambulatory to lobby on crutches.      Payton Bush, RN  02/21/24 2026

## 2024-02-22 NOTE — ED PROVIDER NOTES
Suture removal:    3 sutures were removed from the posterior left shoulder after cyst removal more than 2 weeks ago.  The area was well-approximated and scabbed with slight overgrowth of granulation tissue over the sutures.  The sutures were gently removed with no evidence of wound dehiscence.  There is no evidence of secondary cellulitis.  Patient was advised on local wound care and monitoring for s/s of infection.    SANTA Wilson - CNP  05/21/24  6:02 PM         Berta Corona APRN - CNP  02/21/24 5254    
TABLET    Take 500 mg by mouth every 6 hours as needed    CLONAZEPAM (KLONOPIN) 1 MG TABLET    Take 1 mg by mouth 2 times daily as needed.    GABAPENTIN (NEURONTIN) 100 MG CAPSULE    Take 1 capsule by mouth 3 times daily for 30 days.    LEVOTHYROXINE (SYNTHROID) 125 MCG TABLET    Take 125 mcg by mouth Daily    LISINOPRIL (PRINIVIL;ZESTRIL) 5 MG TABLET    Take 1 tablet by mouth daily    ONDANSETRON (ZOFRAN-ODT) 4 MG DISINTEGRATING TABLET    Take 1 tablet by mouth 3 times daily as needed for Nausea or Vomiting    QUETIAPINE (SEROQUEL XR) 50 MG EXTENDED RELEASE TABLET    Take 50 mg by mouth nightly       Allergies     He is allergic to bactrim [sulfamethoxazole-trimethoprim] and prednisone.                    Mauricio Shaffer MD  02/21/24 1323

## 2024-03-16 ENCOUNTER — APPOINTMENT (OUTPATIENT)
Dept: CT IMAGING | Age: 54
End: 2024-03-16
Payer: COMMERCIAL

## 2024-03-16 ENCOUNTER — HOSPITAL ENCOUNTER (EMERGENCY)
Age: 54
Discharge: HOME | End: 2024-03-16
Attending: STUDENT IN AN ORGANIZED HEALTH CARE EDUCATION/TRAINING PROGRAM
Payer: COMMERCIAL

## 2024-03-16 VITALS
SYSTOLIC BLOOD PRESSURE: 130 MMHG | TEMPERATURE: 97.6 F | OXYGEN SATURATION: 98 % | HEIGHT: 70 IN | WEIGHT: 198.85 LBS | BODY MASS INDEX: 28.47 KG/M2 | DIASTOLIC BLOOD PRESSURE: 78 MMHG | RESPIRATION RATE: 16 BRPM | HEART RATE: 74 BPM

## 2024-03-16 DIAGNOSIS — M54.50 MIDLINE LOW BACK PAIN WITHOUT SCIATICA, UNSPECIFIED CHRONICITY: Primary | ICD-10-CM

## 2024-03-16 PROCEDURE — 72131 CT LUMBAR SPINE W/O DYE: CPT

## 2024-03-16 PROCEDURE — 6370000000 HC RX 637 (ALT 250 FOR IP): Performed by: STUDENT IN AN ORGANIZED HEALTH CARE EDUCATION/TRAINING PROGRAM

## 2024-03-16 PROCEDURE — 72128 CT CHEST SPINE W/O DYE: CPT

## 2024-03-16 PROCEDURE — 99284 EMERGENCY DEPT VISIT MOD MDM: CPT

## 2024-03-16 PROCEDURE — 96372 THER/PROPH/DIAG INJ SC/IM: CPT

## 2024-03-16 PROCEDURE — 6360000002 HC RX W HCPCS: Performed by: STUDENT IN AN ORGANIZED HEALTH CARE EDUCATION/TRAINING PROGRAM

## 2024-03-16 RX ORDER — LIDOCAINE 4 G/G
1 PATCH TOPICAL ONCE
Status: DISCONTINUED | OUTPATIENT
Start: 2024-03-16 | End: 2024-03-17 | Stop reason: HOSPADM

## 2024-03-16 RX ORDER — ACETAMINOPHEN 325 MG/1
650 TABLET ORAL EVERY 6 HOURS PRN
Qty: 30 TABLET | Refills: 0 | Status: SHIPPED | OUTPATIENT
Start: 2024-03-16

## 2024-03-16 RX ORDER — ACETAMINOPHEN 325 MG/1
975 TABLET ORAL ONCE
Status: COMPLETED | OUTPATIENT
Start: 2024-03-16 | End: 2024-03-16

## 2024-03-16 RX ORDER — LIDOCAINE 50 MG/G
1 PATCH TOPICAL DAILY
Qty: 10 PATCH | Refills: 0 | Status: SHIPPED | OUTPATIENT
Start: 2024-03-16 | End: 2024-03-26

## 2024-03-16 RX ORDER — KETOROLAC TROMETHAMINE 30 MG/ML
30 INJECTION, SOLUTION INTRAMUSCULAR; INTRAVENOUS ONCE
Status: COMPLETED | OUTPATIENT
Start: 2024-03-16 | End: 2024-03-16

## 2024-03-16 RX ORDER — DIAZEPAM 10 MG/1
10 TABLET ORAL EVERY 8 HOURS PRN
COMMUNITY

## 2024-03-16 RX ADMIN — KETOROLAC TROMETHAMINE 30 MG: 30 INJECTION, SOLUTION INTRAMUSCULAR at 21:42

## 2024-03-16 RX ADMIN — ACETAMINOPHEN 325MG 975 MG: 325 TABLET ORAL at 21:41

## 2024-03-16 ASSESSMENT — PAIN SCALES - GENERAL
PAINLEVEL_OUTOF10: 8
PAINLEVEL_OUTOF10: 8
PAINLEVEL_OUTOF10: 7

## 2024-03-16 ASSESSMENT — PAIN - FUNCTIONAL ASSESSMENT: PAIN_FUNCTIONAL_ASSESSMENT: 0-10

## 2024-03-17 NOTE — DISCHARGE INSTRUCTIONS
Please follow-up with your primary care doctor.  Please take Tylenol as prescribed.  Please use Lidoderm patch as prescribed.  If you have worsening symptoms please come back to the emergency department which includes fluids or any weakness, numbness or tingling genital region, urinary incontinence, fecal incontinence, urinary retention.
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
